# Patient Record
Sex: FEMALE | Race: WHITE | Employment: OTHER | URBAN - NONMETROPOLITAN AREA
[De-identification: names, ages, dates, MRNs, and addresses within clinical notes are randomized per-mention and may not be internally consistent; named-entity substitution may affect disease eponyms.]

---

## 2017-01-17 ENCOUNTER — OFFICE VISIT (OUTPATIENT)
Dept: CARDIOLOGY | Age: 67
End: 2017-01-17
Payer: MEDICARE

## 2017-01-17 VITALS
WEIGHT: 134 LBS | BODY MASS INDEX: 24.66 KG/M2 | RESPIRATION RATE: 18 BRPM | DIASTOLIC BLOOD PRESSURE: 70 MMHG | HEIGHT: 62 IN | HEART RATE: 68 BPM | SYSTOLIC BLOOD PRESSURE: 130 MMHG

## 2017-01-17 DIAGNOSIS — I10 ESSENTIAL HYPERTENSION: ICD-10-CM

## 2017-01-17 DIAGNOSIS — E78.00 HYPERCHOLESTEREMIA: ICD-10-CM

## 2017-01-17 DIAGNOSIS — I25.10 CORONARY ARTERY DISEASE INVOLVING NATIVE CORONARY ARTERY OF NATIVE HEART WITHOUT ANGINA PECTORIS: Primary | ICD-10-CM

## 2017-01-17 PROCEDURE — G8484 FLU IMMUNIZE NO ADMIN: HCPCS | Performed by: INTERNAL MEDICINE

## 2017-01-17 PROCEDURE — 99212 OFFICE O/P EST SF 10 MIN: CPT | Performed by: INTERNAL MEDICINE

## 2017-01-17 PROCEDURE — G8427 DOCREV CUR MEDS BY ELIG CLIN: HCPCS | Performed by: INTERNAL MEDICINE

## 2017-01-17 PROCEDURE — 3017F COLORECTAL CA SCREEN DOC REV: CPT | Performed by: INTERNAL MEDICINE

## 2017-01-17 PROCEDURE — 3014F SCREEN MAMMO DOC REV: CPT | Performed by: INTERNAL MEDICINE

## 2017-01-17 PROCEDURE — G8598 ASA/ANTIPLAT THER USED: HCPCS | Performed by: INTERNAL MEDICINE

## 2017-01-17 PROCEDURE — 1036F TOBACCO NON-USER: CPT | Performed by: INTERNAL MEDICINE

## 2017-01-17 PROCEDURE — G8420 CALC BMI NORM PARAMETERS: HCPCS | Performed by: INTERNAL MEDICINE

## 2017-01-17 RX ORDER — DESVENLAFAXINE 50 MG/1
50 TABLET, EXTENDED RELEASE ORAL DAILY
COMMUNITY
End: 2017-09-26 | Stop reason: ALTCHOICE

## 2017-01-17 RX ORDER — TRAMADOL HYDROCHLORIDE 50 MG/1
TABLET ORAL
Refills: 0 | COMMUNITY
Start: 2016-11-16 | End: 2017-09-26 | Stop reason: ALTCHOICE

## 2017-09-26 ENCOUNTER — OFFICE VISIT (OUTPATIENT)
Dept: CARDIOLOGY | Age: 67
End: 2017-09-26
Payer: MEDICARE

## 2017-09-26 VITALS
SYSTOLIC BLOOD PRESSURE: 118 MMHG | DIASTOLIC BLOOD PRESSURE: 80 MMHG | HEIGHT: 62 IN | WEIGHT: 136 LBS | HEART RATE: 55 BPM | BODY MASS INDEX: 25.03 KG/M2

## 2017-09-26 DIAGNOSIS — I25.10 CORONARY ARTERY DISEASE INVOLVING NATIVE CORONARY ARTERY OF NATIVE HEART WITHOUT ANGINA PECTORIS: Primary | ICD-10-CM

## 2017-09-26 DIAGNOSIS — I10 ESSENTIAL HYPERTENSION: ICD-10-CM

## 2017-09-26 PROCEDURE — 3017F COLORECTAL CA SCREEN DOC REV: CPT | Performed by: CLINICAL NURSE SPECIALIST

## 2017-09-26 PROCEDURE — G8427 DOCREV CUR MEDS BY ELIG CLIN: HCPCS | Performed by: CLINICAL NURSE SPECIALIST

## 2017-09-26 PROCEDURE — 3014F SCREEN MAMMO DOC REV: CPT | Performed by: CLINICAL NURSE SPECIALIST

## 2017-09-26 PROCEDURE — 1123F ACP DISCUSS/DSCN MKR DOCD: CPT | Performed by: CLINICAL NURSE SPECIALIST

## 2017-09-26 PROCEDURE — 1090F PRES/ABSN URINE INCON ASSESS: CPT | Performed by: CLINICAL NURSE SPECIALIST

## 2017-09-26 PROCEDURE — 1036F TOBACCO NON-USER: CPT | Performed by: CLINICAL NURSE SPECIALIST

## 2017-09-26 PROCEDURE — G8399 PT W/DXA RESULTS DOCUMENT: HCPCS | Performed by: CLINICAL NURSE SPECIALIST

## 2017-09-26 PROCEDURE — 4040F PNEUMOC VAC/ADMIN/RCVD: CPT | Performed by: CLINICAL NURSE SPECIALIST

## 2017-09-26 PROCEDURE — 99213 OFFICE O/P EST LOW 20 MIN: CPT | Performed by: CLINICAL NURSE SPECIALIST

## 2017-09-26 PROCEDURE — G8598 ASA/ANTIPLAT THER USED: HCPCS | Performed by: CLINICAL NURSE SPECIALIST

## 2017-09-26 PROCEDURE — G8420 CALC BMI NORM PARAMETERS: HCPCS | Performed by: CLINICAL NURSE SPECIALIST

## 2017-09-26 PROCEDURE — 93000 ELECTROCARDIOGRAM COMPLETE: CPT | Performed by: CLINICAL NURSE SPECIALIST

## 2017-09-26 RX ORDER — IRBESARTAN 300 MG/1
TABLET ORAL
Refills: 0 | COMMUNITY
Start: 2017-08-01 | End: 2019-12-10

## 2017-09-26 RX ORDER — HYDROCHLOROTHIAZIDE 25 MG/1
TABLET ORAL
Refills: 0 | COMMUNITY
Start: 2017-09-11 | End: 2019-12-10

## 2017-09-26 RX ORDER — CLONIDINE HYDROCHLORIDE 0.1 MG/1
TABLET ORAL
Refills: 0 | COMMUNITY
Start: 2017-07-05 | End: 2019-12-10

## 2017-10-02 ENCOUNTER — TRANSCRIBE ORDERS (OUTPATIENT)
Dept: ADMINISTRATIVE | Facility: HOSPITAL | Age: 67
End: 2017-10-02

## 2017-10-02 DIAGNOSIS — I73.9 PERIPHERAL VASCULAR DISEASE (HCC): Primary | ICD-10-CM

## 2017-10-02 DIAGNOSIS — D48.9 NEOPLASM OF UNCERTAIN BEHAVIOR: ICD-10-CM

## 2017-10-10 ENCOUNTER — HOSPITAL ENCOUNTER (OUTPATIENT)
Dept: ULTRASOUND IMAGING | Facility: HOSPITAL | Age: 67
Discharge: HOME | End: 2017-10-10

## 2017-10-10 ENCOUNTER — HOSPITAL ENCOUNTER (OUTPATIENT)
Dept: MRI IMAGING | Facility: HOSPITAL | Age: 67
Discharge: HOME | End: 2017-10-10
Admitting: NURSE PRACTITIONER

## 2017-10-10 DIAGNOSIS — D48.9 NEOPLASM OF UNCERTAIN BEHAVIOR: ICD-10-CM

## 2017-10-10 DIAGNOSIS — I73.9 PERIPHERAL VASCULAR DISEASE (HCC): ICD-10-CM

## 2017-10-10 PROCEDURE — 93923 UPR/LXTR ART STDY 3+ LVLS: CPT

## 2018-01-15 ENCOUNTER — TELEPHONE (OUTPATIENT)
Dept: CARDIOLOGY | Age: 68
End: 2018-01-15

## 2018-01-15 NOTE — TELEPHONE ENCOUNTER
LVM; Attempted to contact pt to move her appt to earlier in the day of Tuesday, 1- in order to consolidate Dr. Avalos Pea

## 2018-02-02 ENCOUNTER — TELEPHONE (OUTPATIENT)
Dept: CARDIOLOGY | Age: 68
End: 2018-02-02

## 2018-02-15 ENCOUNTER — OFFICE VISIT (OUTPATIENT)
Dept: CARDIOLOGY | Age: 68
End: 2018-02-15
Payer: MEDICARE

## 2018-02-15 VITALS
HEART RATE: 56 BPM | BODY MASS INDEX: 29.44 KG/M2 | HEIGHT: 62 IN | WEIGHT: 160 LBS | DIASTOLIC BLOOD PRESSURE: 68 MMHG | SYSTOLIC BLOOD PRESSURE: 140 MMHG

## 2018-02-15 DIAGNOSIS — I10 ESSENTIAL HYPERTENSION: ICD-10-CM

## 2018-02-15 DIAGNOSIS — I25.10 CORONARY ARTERY DISEASE INVOLVING NATIVE CORONARY ARTERY OF NATIVE HEART WITHOUT ANGINA PECTORIS: Primary | ICD-10-CM

## 2018-02-15 PROCEDURE — 99213 OFFICE O/P EST LOW 20 MIN: CPT | Performed by: CLINICAL NURSE SPECIALIST

## 2018-02-15 NOTE — PROGRESS NOTES
2/2/2014  lexiscan Positive for inferior apical myocardial ischemia, EF 62%, 2% ischemic myocardium on stress, low risk findings, AUC indication 15, AUC score 4     Chronic back pain     Chronic kidney disease, stage III (moderate)     GFR 40 mL/minute 7/10/15.  History of blood transfusion     Hyperlipidemia 1/6/2015    Hypertension     Osteoarthritis     Osteoporosis 8/2015    Torn tendon     LT HIP     Past Surgical History:   Procedure Laterality Date    ANKLE SURGERY Right     Right ankle fusion.  APPENDECTOMY      BACK SURGERY      BREAST SURGERY Bilateral     Bilateral breast reduction.  CARDIAC CATHETERIZATION  2/2/14  JDT    EF 50%    CARDIAC CATHETERIZATION  01/2016    Normal EF, mild nonocclusive CAD    CERVICAL FUSION      [facility], Dr. Tanya Medina.  CHOLECYSTECTOMY      COLONOSCOPY      DILATATION, ESOPHAGUS      ENDOSCOPY, COLON, DIAGNOSTIC      FRACTURE SURGERY      GASTRIC FUNDOPLICATION  4308    HERNIA REPAIR      SABINE    HYSTERECTOMY      total    JOINT REPLACEMENT      TONSILLECTOMY      TONSILLECTOMY      TOTAL HIP ARTHROPLASTY  11/2014    TOTAL KNEE ARTHROPLASTY Bilateral     TOTAL KNEE ARTHROPLASTY Right 2002    Total knee replacement, right knee revision, Dr. Marla Lynne 2002.       Family History   Problem Relation Age of Onset    Kidney Disease Mother     High Blood Pressure Mother     Heart Disease Father     Kidney Disease Father     Brain Cancer Brother      Social History   Substance Use Topics    Smoking status: Never Smoker    Smokeless tobacco: Never Used    Alcohol use No      Current Outpatient Prescriptions   Medication Sig Dispense Refill    cloNIDine (CATAPRES) 0.1 MG tablet TAKE 1 TABLET BY MOUTH AS NEEDED  0    hydrochlorothiazide (HYDRODIURIL) 25 MG tablet TAKE 1 TABLET BY MOUTH EVERY DAY  0    irbesartan (AVAPRO) 300 MG tablet 1 TABLET BY MOUTH DAILY  0    metoprolol tartrate (LOPRESSOR) 25 MG tablet Take 1 tablet by mouth 2 times daily 180 tablet 3    HYDROcodone-acetaminophen (NORCO)  MG per tablet Take 1 tablet by mouth every 6 hours as needed for Pain 120 tablet 0    estrogens, conjugated, (PREMARIN) 0.625 MG tablet Take 1 tablet by mouth daily 30 tablet 0    ALPRAZolam (XANAX) 0.25 MG tablet Take 0.25 mg by mouth nightly as needed.  PROVENTIL  (90 BASE) MCG/ACT inhaler Inhale 2 puffs into the lungs every 6 hours as needed        No current facility-administered medications for this visit. Allergies: Meloxicam; Diovan [valsartan]; and Demeclocycline hcl    Review of Systems  Constitutional  no significant activity change, appetite change, or unexpected weight change. No fever, chills or diaphoresis. No fatigue. HEENT  no significant rhinorrhea or epistaxis. No tinnitus or significant hearing loss. Eyes  no sudden vision change or amaurosis. Respiratory  no significant wheezing, stridor, apnea or cough. No dyspnea on exertion or shortness of breath. Cardiovascular  no exertional chest pain, orthopnea or PND. No sensation of arrhythmia or slow heart rate. No claudication or leg edema. Gastrointestinal  no abdominal swelling or pain. No blood in stool. No severe constipation, diarrhea, nausea, or vomiting. Genitourinary  no difficulty urinating, dysuria, frequency, or urgency. No flank pain or hematuria. Musculoskeletal  Hip pain with gait disturbance. She uses a cane but walker if having to walk long distance. Skin  no color change or rash. No pallor. No new surgical incision. Neurologic  no speech difficulty, facial asymmetry or lateralizing weakness. No seizures, presyncope, syncope, or significant dizziness. Hematologic  no easy bruising or excessive bleeding. Psychiatric  no severe anxiety or insomnia. No confusion. All other review of systems are negative.       Objective  Vital Signs - BP (!) 140/68   Pulse 56   Ht 5' 2\" (1.575 m)   Wt 160 lb (72.6 kg) BMI 29.26 kg/m²   General - Alisson is alert, cooperative, and pleasant. Well groomed. No acute distress. Body habitus is overweight. HEENT  The head is normocephalic. No circumoral cyanosis. Dentition is normal.   EYES -  No Xanthelasma, no arcus senilis, no conjunctival hemorrhages or discharge. Neck - Supple, without increased jugular venous pressures. No carotid bruits. No mass. Respiratory - Lungs are clear bilaterally. No wheezes or rales. Normal effort without use of accessory muscles. Cardiovascular  Heart has regular rhythm and rate. No murmurs, rubs or gallops. + pedal pulses and no varicosities. Abdominal -  Soft, nontender, nondistended. Bowel sounds are intact. Extremities - No clubbing, cyanosis, or  edema. Musculoskeletal -  No clubbing . No Osler's nodes. Gait- uses walker/cane. No kyphosis or scoliosis. Skin -  no statis ulcers or dermatitis. Neurological - No focal signs are identified. Oriented to person, place and time. Psychiatric -  Appropriate affect and mood. Assessment:    1. Coronary artery disease involving native coronary artery of native heart without angina pectoris     2. Essential hypertension       Data:  BP Readings from Last 3 Encounters:   02/15/18 (!) 140/68   09/26/17 118/80   01/17/17 130/70    Pulse Readings from Last 3 Encounters:   02/15/18 56   09/26/17 55   01/17/17 68      Blood pressure slightly elevated today but has been OK. Heart rate well controlled. Medical management includes ARB, beta blocker, HCTZ. States taking medications as prescribed  Stable cardiovascular status. No evidence of overt heart failure, angina or dysrhythmia. Plan    Follow up in 6 mos With Dr. Maximilian Herbert   Call with any questions or concerns  Follow up with Anna Moss DO for non cardiac problems  Report any new problems  Cardiovascular Fitness-Exercise as tolerated. Strive for 30 minutes of exercise most days of the week. Cardiac / Healthy Diet  Continue current medications as directed  Continue plan of treatment  It is always recommended that you bring your medications bottles with you to each visit - this is for your safety!        MICHELLE Martinez

## 2018-03-05 ENCOUNTER — HOSPITAL ENCOUNTER (OUTPATIENT)
Dept: WOMENS IMAGING | Age: 68
Discharge: HOME | End: 2018-03-05
Payer: MEDICARE

## 2018-03-05 DIAGNOSIS — Z12.31 ENCOUNTER FOR SCREENING MAMMOGRAM FOR BREAST CANCER: ICD-10-CM

## 2018-03-05 PROCEDURE — 77067 SCR MAMMO BI INCL CAD: CPT

## 2018-06-28 ENCOUNTER — TELEPHONE (OUTPATIENT)
Dept: CARDIOLOGY | Age: 68
End: 2018-06-28

## 2018-07-18 ENCOUNTER — TELEPHONE (OUTPATIENT)
Dept: CARDIOLOGY | Age: 68
End: 2018-07-18

## 2018-07-18 NOTE — TELEPHONE ENCOUNTER
Tyra Elliott no longer working PM on Thursdays, unable to reach pt and moved appt to 8/31 230PM sent letter to remind

## 2018-10-05 ENCOUNTER — TELEPHONE (OUTPATIENT)
Dept: CARDIOLOGY | Age: 68
End: 2018-10-05

## 2018-11-20 ENCOUNTER — TELEPHONE (OUTPATIENT)
Dept: CARDIOLOGY | Age: 68
End: 2018-11-20

## 2018-11-20 NOTE — TELEPHONE ENCOUNTER
Unable to leave vm on pt phone in regards to no show appt will be sending pt a letter on missed appt.  If pt does call pt can be scheduled w [facility] at [facility]

## 2018-12-10 ENCOUNTER — TELEPHONE (OUTPATIENT)
Dept: CARDIOLOGY | Age: 68
End: 2018-12-10

## 2018-12-10 ENCOUNTER — OFFICE VISIT (OUTPATIENT)
Dept: CARDIOLOGY | Age: 68
End: 2018-12-10
Payer: MEDICARE

## 2018-12-10 VITALS
SYSTOLIC BLOOD PRESSURE: 122 MMHG | HEIGHT: 60 IN | BODY MASS INDEX: 30.04 KG/M2 | HEART RATE: 46 BPM | WEIGHT: 153 LBS | DIASTOLIC BLOOD PRESSURE: 60 MMHG

## 2018-12-10 DIAGNOSIS — Z78.9 STATIN INTOLERANCE: ICD-10-CM

## 2018-12-10 DIAGNOSIS — I10 ESSENTIAL HYPERTENSION: ICD-10-CM

## 2018-12-10 DIAGNOSIS — I25.10 CORONARY ARTERY DISEASE INVOLVING NATIVE CORONARY ARTERY OF NATIVE HEART WITHOUT ANGINA PECTORIS: Primary | ICD-10-CM

## 2018-12-10 DIAGNOSIS — E78.00 HYPERCHOLESTEREMIA: ICD-10-CM

## 2018-12-10 PROCEDURE — 1036F TOBACCO NON-USER: CPT | Performed by: CLINICAL NURSE SPECIALIST

## 2018-12-10 PROCEDURE — 3017F COLORECTAL CA SCREEN DOC REV: CPT | Performed by: CLINICAL NURSE SPECIALIST

## 2018-12-10 PROCEDURE — 1123F ACP DISCUSS/DSCN MKR DOCD: CPT | Performed by: CLINICAL NURSE SPECIALIST

## 2018-12-10 PROCEDURE — 1090F PRES/ABSN URINE INCON ASSESS: CPT | Performed by: CLINICAL NURSE SPECIALIST

## 2018-12-10 PROCEDURE — G8419 CALC BMI OUT NRM PARAM NOF/U: HCPCS | Performed by: CLINICAL NURSE SPECIALIST

## 2018-12-10 PROCEDURE — G8484 FLU IMMUNIZE NO ADMIN: HCPCS | Performed by: CLINICAL NURSE SPECIALIST

## 2018-12-10 PROCEDURE — 99213 OFFICE O/P EST LOW 20 MIN: CPT | Performed by: CLINICAL NURSE SPECIALIST

## 2018-12-10 PROCEDURE — 4040F PNEUMOC VAC/ADMIN/RCVD: CPT | Performed by: CLINICAL NURSE SPECIALIST

## 2018-12-10 PROCEDURE — G8599 NO ASA/ANTIPLAT THER USE RNG: HCPCS | Performed by: CLINICAL NURSE SPECIALIST

## 2018-12-10 PROCEDURE — 1101F PT FALLS ASSESS-DOCD LE1/YR: CPT | Performed by: CLINICAL NURSE SPECIALIST

## 2018-12-10 PROCEDURE — G8399 PT W/DXA RESULTS DOCUMENT: HCPCS | Performed by: CLINICAL NURSE SPECIALIST

## 2018-12-10 PROCEDURE — 93000 ELECTROCARDIOGRAM COMPLETE: CPT | Performed by: CLINICAL NURSE SPECIALIST

## 2018-12-10 PROCEDURE — G8427 DOCREV CUR MEDS BY ELIG CLIN: HCPCS | Performed by: CLINICAL NURSE SPECIALIST

## 2018-12-10 ASSESSMENT — ENCOUNTER SYMPTOMS
VOMITING: 0
WHEEZING: 0
FACIAL SWELLING: 0
SHORTNESS OF BREATH: 0
COUGH: 0
ABDOMINAL PAIN: 0
CHEST TIGHTNESS: 0
NAUSEA: 0
EYE REDNESS: 0

## 2018-12-10 NOTE — PROGRESS NOTES
 estrogens, conjugated, (PREMARIN) 0.625 MG tablet Take 1 tablet by mouth daily 30 tablet 0    ALPRAZolam (XANAX) 0.25 MG tablet Take 0.25 mg by mouth nightly as needed.  PROVENTIL  (90 BASE) MCG/ACT inhaler Inhale 2 puffs into the lungs every 6 hours as needed        No current facility-administered medications for this visit. Allergies: Meloxicam; Diovan [valsartan]; and Demeclocycline hcl    Review of Systems  Review of Systems   Constitutional: Negative for activity change, diaphoresis, fatigue, fever and unexpected weight change. HENT: Negative for facial swelling and nosebleeds. Eyes: Negative for redness and visual disturbance. Respiratory: Negative for cough, chest tightness, shortness of breath and wheezing. Cardiovascular: Positive for palpitations. Negative for chest pain and leg swelling. Gastrointestinal: Negative for abdominal pain, nausea and vomiting. Endocrine: Negative for cold intolerance and heat intolerance. Genitourinary: Negative for dysuria and hematuria. Musculoskeletal: Negative for arthralgias and myalgias. Unsteady gait   Skin: Negative for pallor and rash. Neurological: Negative for dizziness, seizures, syncope, weakness and light-headedness. Hematological: Does not bruise/bleed easily. Psychiatric/Behavioral: Negative for agitation. The patient is not nervous/anxious. Objective  Vital Signs - /60   Pulse (!) 46   Ht 5' (1.524 m)   Wt 153 lb (69.4 kg)   BMI 29.88 kg/m²   Physical Exam   Constitutional: She is oriented to person, place, and time. She appears well-developed and well-nourished. HENT:   Head: Normocephalic and atraumatic. Eyes: Pupils are equal, round, and reactive to light. Right eye exhibits no discharge. Left eye exhibits no discharge. Neck: No JVD present. No tracheal deviation present. Cardiovascular: Normal rate, regular rhythm, normal heart sounds and intact distal pulses.   Exam reveals no

## 2018-12-11 RX ORDER — EZETIMIBE 10 MG/1
10 TABLET ORAL DAILY
Qty: 30 TABLET | Refills: 5 | Status: SHIPPED | OUTPATIENT
Start: 2018-12-11 | End: 2019-03-08 | Stop reason: SDUPTHER

## 2019-02-07 ENCOUNTER — TRANSCRIBE ORDERS (OUTPATIENT)
Dept: ONCOLOGY | Facility: CLINIC | Age: 69
End: 2019-02-07

## 2019-02-07 DIAGNOSIS — E61.1 IRON DEFICIENCY: ICD-10-CM

## 2019-02-07 DIAGNOSIS — D47.2 MONOCLONAL GAMMOPATHY: Primary | ICD-10-CM

## 2019-02-07 DIAGNOSIS — D64.9 NORMOCYTIC ANEMIA: ICD-10-CM

## 2019-02-07 DIAGNOSIS — E53.8 VITAMIN B12 DEFICIENCY: ICD-10-CM

## 2019-02-07 DIAGNOSIS — D75.89 MACROCYTOSIS: ICD-10-CM

## 2019-03-08 RX ORDER — EZETIMIBE 10 MG/1
10 TABLET ORAL DAILY
Qty: 90 TABLET | Refills: 3 | Status: SHIPPED | OUTPATIENT
Start: 2019-03-08 | End: 2019-12-10

## 2019-04-25 ENCOUNTER — HOSPITAL ENCOUNTER (OUTPATIENT)
Dept: MRI IMAGING | Age: 69
Discharge: HOME | End: 2019-04-25
Attending: ORTHOPAEDIC SURGERY

## 2019-04-25 ENCOUNTER — OFFICE VISIT (OUTPATIENT)
Dept: ORTHOPEDICS | Age: 69
End: 2019-04-25

## 2019-04-25 ENCOUNTER — IMAGING SERVICES (OUTPATIENT)
Dept: GENERAL RADIOLOGY | Age: 69
End: 2019-04-25
Attending: ORTHOPAEDIC SURGERY

## 2019-04-25 DIAGNOSIS — M25.552 LEFT HIP PAIN: ICD-10-CM

## 2019-04-25 DIAGNOSIS — M70.62 TROCHANTERIC BURSITIS OF LEFT HIP: ICD-10-CM

## 2019-04-25 DIAGNOSIS — M76.892 ENTHESOPATHY OF LEFT HIP REGION: ICD-10-CM

## 2019-04-25 DIAGNOSIS — Z96.649 STATUS POST REVISION OF TOTAL HIP: ICD-10-CM

## 2019-04-25 DIAGNOSIS — M25.552 LEFT HIP PAIN: Primary | ICD-10-CM

## 2019-04-25 PROCEDURE — 20610 DRAIN/INJ JOINT/BURSA W/O US: CPT | Performed by: ORTHOPAEDIC SURGERY

## 2019-04-25 PROCEDURE — 99202 OFFICE O/P NEW SF 15 MIN: CPT | Performed by: ORTHOPAEDIC SURGERY

## 2019-04-25 PROCEDURE — 73502 X-RAY EXAM HIP UNI 2-3 VIEWS: CPT | Performed by: RADIOLOGY

## 2019-04-25 PROCEDURE — 73721 MRI JNT OF LWR EXTRE W/O DYE: CPT

## 2019-04-25 PROCEDURE — 73721 MRI JNT OF LWR EXTRE W/O DYE: CPT | Performed by: RADIOLOGY

## 2019-04-26 ENCOUNTER — LETT/CORR TRANS (OUTPATIENT)
Dept: ORTHOPEDICS | Age: 69
End: 2019-04-26

## 2019-05-03 ENCOUNTER — TELEPHONE (OUTPATIENT)
Dept: ORTHOPEDICS | Age: 69
End: 2019-05-03

## 2019-12-10 ENCOUNTER — OFFICE VISIT (OUTPATIENT)
Dept: CARDIOLOGY | Age: 69
End: 2019-12-10
Payer: MEDICARE

## 2019-12-10 VITALS
DIASTOLIC BLOOD PRESSURE: 74 MMHG | HEART RATE: 72 BPM | WEIGHT: 150 LBS | HEIGHT: 60 IN | BODY MASS INDEX: 29.45 KG/M2 | SYSTOLIC BLOOD PRESSURE: 138 MMHG

## 2019-12-10 DIAGNOSIS — E78.2 MIXED HYPERLIPIDEMIA: ICD-10-CM

## 2019-12-10 DIAGNOSIS — I10 ESSENTIAL HYPERTENSION: ICD-10-CM

## 2019-12-10 DIAGNOSIS — R00.1 BRADYCARDIA: ICD-10-CM

## 2019-12-10 DIAGNOSIS — Z78.9 STATIN INTOLERANCE: ICD-10-CM

## 2019-12-10 DIAGNOSIS — I25.10 CORONARY ARTERY DISEASE INVOLVING NATIVE CORONARY ARTERY OF NATIVE HEART WITHOUT ANGINA PECTORIS: Primary | ICD-10-CM

## 2019-12-10 PROCEDURE — G8417 CALC BMI ABV UP PARAM F/U: HCPCS | Performed by: CLINICAL NURSE SPECIALIST

## 2019-12-10 PROCEDURE — 93000 ELECTROCARDIOGRAM COMPLETE: CPT | Performed by: CLINICAL NURSE SPECIALIST

## 2019-12-10 PROCEDURE — 1036F TOBACCO NON-USER: CPT | Performed by: CLINICAL NURSE SPECIALIST

## 2019-12-10 PROCEDURE — 1090F PRES/ABSN URINE INCON ASSESS: CPT | Performed by: CLINICAL NURSE SPECIALIST

## 2019-12-10 PROCEDURE — 1123F ACP DISCUSS/DSCN MKR DOCD: CPT | Performed by: CLINICAL NURSE SPECIALIST

## 2019-12-10 PROCEDURE — 3017F COLORECTAL CA SCREEN DOC REV: CPT | Performed by: CLINICAL NURSE SPECIALIST

## 2019-12-10 PROCEDURE — G8599 NO ASA/ANTIPLAT THER USE RNG: HCPCS | Performed by: CLINICAL NURSE SPECIALIST

## 2019-12-10 PROCEDURE — 99213 OFFICE O/P EST LOW 20 MIN: CPT | Performed by: CLINICAL NURSE SPECIALIST

## 2019-12-10 PROCEDURE — 4040F PNEUMOC VAC/ADMIN/RCVD: CPT | Performed by: CLINICAL NURSE SPECIALIST

## 2019-12-10 PROCEDURE — G8399 PT W/DXA RESULTS DOCUMENT: HCPCS | Performed by: CLINICAL NURSE SPECIALIST

## 2019-12-10 PROCEDURE — G8427 DOCREV CUR MEDS BY ELIG CLIN: HCPCS | Performed by: CLINICAL NURSE SPECIALIST

## 2019-12-10 PROCEDURE — G8484 FLU IMMUNIZE NO ADMIN: HCPCS | Performed by: CLINICAL NURSE SPECIALIST

## 2019-12-10 RX ORDER — OXYBUTYNIN CHLORIDE 5 MG/1
5 TABLET ORAL DAILY
COMMUNITY
Start: 2019-11-19

## 2019-12-10 RX ORDER — CLONIDINE HYDROCHLORIDE 0.1 MG/1
0.1 TABLET ORAL PRN
Qty: 90 TABLET | Refills: 3 | Status: SHIPPED | OUTPATIENT
Start: 2019-12-10 | End: 2019-12-18 | Stop reason: SDUPTHER

## 2019-12-10 RX ORDER — AMOXICILLIN AND CLAVULANATE POTASSIUM 875; 125 MG/1; MG/1
1 TABLET, FILM COATED ORAL DAILY
Refills: 0 | COMMUNITY
Start: 2019-11-27 | End: 2020-02-20 | Stop reason: ALTCHOICE

## 2019-12-10 RX ORDER — IRBESARTAN 300 MG/1
300 TABLET ORAL DAILY
COMMUNITY

## 2019-12-10 RX ORDER — MONTELUKAST SODIUM 10 MG/1
10 TABLET ORAL DAILY
COMMUNITY
Start: 2019-11-19

## 2019-12-10 RX ORDER — HYDROCHLOROTHIAZIDE 25 MG/1
25 TABLET ORAL DAILY
COMMUNITY

## 2019-12-10 RX ORDER — CLONIDINE HYDROCHLORIDE 0.1 MG/1
0.1 TABLET ORAL PRN
COMMUNITY
End: 2019-12-10 | Stop reason: SDUPTHER

## 2019-12-10 ASSESSMENT — ENCOUNTER SYMPTOMS
FACIAL SWELLING: 0
CHEST TIGHTNESS: 0
VOMITING: 0
WHEEZING: 0
NAUSEA: 0
ABDOMINAL PAIN: 0
EYE REDNESS: 0
COUGH: 0
SHORTNESS OF BREATH: 0

## 2019-12-18 RX ORDER — CLONIDINE HYDROCHLORIDE 0.1 MG/1
TABLET ORAL
Qty: 90 TABLET | Refills: 3 | Status: SHIPPED | OUTPATIENT
Start: 2019-12-18

## 2019-12-20 ENCOUNTER — TELEPHONE (OUTPATIENT)
Dept: OTOLARYNGOLOGY | Age: 69
End: 2019-12-20

## 2020-01-16 ENCOUNTER — OFFICE VISIT (OUTPATIENT)
Dept: OTOLARYNGOLOGY | Age: 70
End: 2020-01-16
Payer: MEDICARE

## 2020-01-16 VITALS
HEIGHT: 60 IN | DIASTOLIC BLOOD PRESSURE: 86 MMHG | WEIGHT: 150.31 LBS | TEMPERATURE: 97.8 F | BODY MASS INDEX: 29.51 KG/M2 | SYSTOLIC BLOOD PRESSURE: 128 MMHG

## 2020-01-16 PROCEDURE — 3017F COLORECTAL CA SCREEN DOC REV: CPT | Performed by: NURSE PRACTITIONER

## 2020-01-16 PROCEDURE — 1090F PRES/ABSN URINE INCON ASSESS: CPT | Performed by: NURSE PRACTITIONER

## 2020-01-16 PROCEDURE — G8427 DOCREV CUR MEDS BY ELIG CLIN: HCPCS | Performed by: NURSE PRACTITIONER

## 2020-01-16 PROCEDURE — G8417 CALC BMI ABV UP PARAM F/U: HCPCS | Performed by: NURSE PRACTITIONER

## 2020-01-16 PROCEDURE — 1123F ACP DISCUSS/DSCN MKR DOCD: CPT | Performed by: NURSE PRACTITIONER

## 2020-01-16 PROCEDURE — G8399 PT W/DXA RESULTS DOCUMENT: HCPCS | Performed by: NURSE PRACTITIONER

## 2020-01-16 PROCEDURE — G8484 FLU IMMUNIZE NO ADMIN: HCPCS | Performed by: NURSE PRACTITIONER

## 2020-01-16 PROCEDURE — 4040F PNEUMOC VAC/ADMIN/RCVD: CPT | Performed by: NURSE PRACTITIONER

## 2020-01-16 PROCEDURE — 99213 OFFICE O/P EST LOW 20 MIN: CPT | Performed by: NURSE PRACTITIONER

## 2020-01-16 PROCEDURE — 1036F TOBACCO NON-USER: CPT | Performed by: NURSE PRACTITIONER

## 2020-01-16 RX ORDER — AZELASTINE HYDROCHLORIDE 137 UG/1
SPRAY, METERED NASAL
Refills: 5 | Status: ON HOLD | COMMUNITY
Start: 2019-11-26 | End: 2020-03-11 | Stop reason: HOSPADM

## 2020-01-16 ASSESSMENT — ENCOUNTER SYMPTOMS
GASTROINTESTINAL NEGATIVE: 1
EYES NEGATIVE: 1
WHEEZING: 1

## 2020-01-16 NOTE — PATIENT INSTRUCTIONS
Continue current allergy regimen until return to evaluate CT sinus imaging studies to determine if candidate for surgery    Will re-scan to determine if sinuses are still obstructed    Discussed that repair of left TM would require probable reconstruction of TM at [facility].  Otherwise Dry ear precautions    Call for problems or worsening symptoms

## 2020-01-16 NOTE — PROGRESS NOTES
Office Visit     Patient Care Team: Patient Care Team:  Yael Oropeza DO as PCP - [place] [id number], DO as PCP - [facility] Provider  Nelsy Wylie MD (Cardiology)  Cinthya Diaz MD as Consulting Physician (Family Medicine)  Audra Manzanares MD  Surgery: No surgery found No surgery found    Chief Complaint:  Sinus Problem      Viridiana Gallo is a 81 y.o. female who is here for for evaluation of chronic rhinosinusitis. . She reports her symptoms are severe in nature with allergies symptoms, nasal drainage sinus pressure occurring constantly however reports 1-2 severe infections per year. These symptoms initially started several year(s). The patient symptoms have been are worsening. The symptoms are aggravated by weather fluctuations, allergens. The symptoms are alleviated by  nothing. She reports difficulty breathing through her nose but primarily at night. She reports dull maxillary sinus pressure that occurs daily. She reports that her sinus problems aggravate her asthma as well. She did undergo a CT sinuses in November in [place] with near complete opacification of maxillary sinuses with notable septal deviation - I do not have these images. She notes a history of a broken nose. She is completing her third week of Augmentin for a sinus infection. She is on Flonase, Astelin, Zyrtec and Singulair for allergy therapy. She also has a history of hearing loss with placement of a tube Dr Lavera Najjar ? 6-8 months ago with tube extrusion with a subsequent perforation. She had a notable left sided hearing loss on audiogram in Nov 2019. DATA REVIEWED THIS VISIT:   CT  Report  Prior Audiogram     This data has been reviewed by MICHELLE Telles and treatment implemented as necessary.            Past Medical History:   Diagnosis Date    Asthma     CAD (coronary artery disease)     Chest tightness or pressure 2/2/2014 2/2/2014  lexiscan Positive for inferior apical myocardial ischemia, EF 62%, 2% ischemic myocardium on stress, low risk findings, AUC indication 15, AUC score 4     Chronic back pain     Chronic kidney disease, stage III (moderate) (HCC)     GFR 40 mL/minute 7/10/15.  History of blood transfusion     Hyperlipidemia 1/6/2015    Hypertension     Osteoarthritis     Osteoporosis 8/2015    Torn tendon     LT HIP          Past Surgical History:   Procedure Laterality Date    ANKLE SURGERY Right     Right ankle fusion.  APPENDECTOMY      BACK SURGERY      BREAST SURGERY Bilateral     Bilateral breast reduction.  CARDIAC CATHETERIZATION  2/2/14  JDT    EF 50%    CARDIAC CATHETERIZATION  01/2016    Normal EF, mild nonocclusive CAD    CERVICAL FUSION      [facility], Dr. Kade Cerda.  CHOLECYSTECTOMY      COLONOSCOPY      DILATATION, ESOPHAGUS      ENDOSCOPY, COLON, DIAGNOSTIC      FRACTURE SURGERY      GASTRIC FUNDOPLICATION  0215    HERNIA REPAIR      SABINE    HYSTERECTOMY      total    JOINT REPLACEMENT      TONSILLECTOMY      TONSILLECTOMY      TOTAL HIP ARTHROPLASTY  11/2014    TOTAL KNEE ARTHROPLASTY Bilateral     TOTAL KNEE ARTHROPLASTY Right 2002    Total knee replacement, right knee revision, Dr. Katiuska Davis 2002.            Allergies   Allergen Reactions    Meloxicam Other (See Comments)     Kidney failure (to also include the generic form Mobic)    Diovan [Valsartan]      Kidney failure    Demeclocycline Hcl Rash          Family History   Problem Relation Age of Onset    Kidney Disease Mother     High Blood Pressure Mother     Heart Disease Father     Kidney Disease Father     Brain Cancer Brother           Social History     Socioeconomic History    Marital status:      Spouse name: None    Number of children: None    Years of education: None    Highest education level: None   Occupational History    None   Social Needs    Financial resource strain: None    Food insecurity:     Worry: None     Inability: needed        No current facility-administered medications for this visit. Review of Systems   Constitutional: Negative. HENT:        See HPI   Eyes: Negative. Respiratory: Positive for wheezing. Asthma   Cardiovascular: Negative. Gastrointestinal: Negative. Endocrine: Negative. Genitourinary: Negative. Musculoskeletal: Negative. Skin: Negative. Allergic/Immunologic: Positive for environmental allergies. Neurological: Negative. Hematological: Negative. Psychiatric/Behavioral: Negative. Physical Exam  /86   Temp 97.8 °F (36.6 °C)   Ht 5' (1.524 m)   Wt 150 lb 5 oz (68.2 kg)   BMI 29.36 kg/m²     CONSTITUTIONAL: well nourished, well-developed, alert, oriented, in no acute distress     COMMUNICATION AND VOICE: able to communicate normally, normal voice quality    HEAD: normocephalic, no lesions, atraumatic, no tenderness, no masses     EYES: ocular motility normal, eyelids normal, orbits normal, no proptosis, conjunctiva normal , pupils equal, round     FACE: appearance normal, no lesions, no tenderness, no deformities, facial motion symmetric. Monica Beagle       SALIVARY GLANDS: parotid glands with no tenderness, no swelling, no masses, submandibular glands with normal size, nontender    EARS:  Hearing: hearing to conversational voice moderately impaired    Ears:     Right Ear:     External: external ears normal     Otoscopy Ear Canal: canal clear     Otoscopy TM: perforation: marginal and 80%       Left Ear:     External: external ears normal     Otoscopy Ear Canal: canal clear     Otoscopy TM: TM's normal      NOSE:    External nose is deviated to the right    Nose: nares normal, septum deviated  Left, mucosa inflamed, mucosa congested and turbinates: hypertrophic      Nasal membranes are without lesions, vestibule without purulence      ORAL:    Oral:    Lips: normal upper and lower lips without lesion    Teeth: good dentition    Oropharynx:normal Tonsils are normal to inspection without masses or lesions. Palate and uvula are normal.  Posterior pharynx without lesions or erythema. Oral         mucosa is moist without lesions. Tongue: normal tongue is normal to inspection without lesions, normal tongue mobility, lingual mucosa normal    Floor of mouth: Warthin ducts patent, mucosa normal       LARYNGEAL:      Hypopharynx: not examined     Larynx: not examined       NECK:     Inspection and Palpation: neck appearance normal, no masses or       Tenderness. Thyroid is normal without enlargement or nodules palpated, rhonchi noted bilaterally       LYMPHATIC: No cervical lymphadenopathy noted. CHEST/RESPIRATORY: normal respiratory effort, no shortness of breath      CARDIOVASCULAR: no cyanosis or edema       NEUROLOGICAL/PSYCHIATRIC: oriented to time, place and person,       mood normal, affect appropriate, CN II-XII intact grossly      Assessment/ Plan:       Diagnosis Orders   1. Chronic maxillary sinusitis  CT SINUS WO CONTRAST   2. Deviated septum  CT SINUS WO CONTRAST   3. Hypertrophy of nasal turbinates  CT SINUS WO CONTRAST   4. Chronic sinusitis, unspecified location     5. Allergic rhinitis, unspecified seasonality, unspecified trigger     6. Asthma, unspecified asthma severity, unspecified whether complicated, unspecified whether persistent     7. Mixed conductive and sensorineural hearing loss of left ear, unspecified hearing status on contralateral side     8. Sensorineural hearing loss (SNHL) of right ear, unspecified hearing status on contralateral side     9. Perforation of tympanic membrane, left     10.  ETD (Eustachian tube dysfunction), bilateral           Orders Placed This Encounter   Procedures    CT SINUS WO CONTRAST     Standing Status:   Future     Standing Expiration Date:   1/16/2021     Order Specific Question:   Reason for exam:     Answer:   chronic rhinosinusitis, deviated septum, turbinate hypertrophy       No orders of the defined

## 2020-01-23 ENCOUNTER — HOSPITAL ENCOUNTER (OUTPATIENT)
Dept: WOMENS IMAGING | Age: 70
Discharge: HOME | End: 2020-01-23
Payer: MEDICARE

## 2020-01-23 ENCOUNTER — HOSPITAL ENCOUNTER (OUTPATIENT)
Dept: CT IMAGING | Age: 70
Discharge: HOME | End: 2020-01-23
Payer: MEDICARE

## 2020-01-23 PROCEDURE — 70486 CT MAXILLOFACIAL W/O DYE: CPT

## 2020-01-23 PROCEDURE — 77080 DXA BONE DENSITY AXIAL: CPT

## 2020-02-06 ENCOUNTER — OFFICE VISIT (OUTPATIENT)
Dept: OTOLARYNGOLOGY | Age: 70
End: 2020-02-06
Payer: MEDICARE

## 2020-02-06 ENCOUNTER — TELEPHONE (OUTPATIENT)
Dept: OTOLARYNGOLOGY | Age: 70
End: 2020-02-06

## 2020-02-06 VITALS
BODY MASS INDEX: 29.45 KG/M2 | SYSTOLIC BLOOD PRESSURE: 122 MMHG | HEIGHT: 60 IN | WEIGHT: 150 LBS | DIASTOLIC BLOOD PRESSURE: 82 MMHG

## 2020-02-06 PROBLEM — J34.3 NASAL TURBINATE HYPERTROPHY: Status: ACTIVE | Noted: 2020-02-06

## 2020-02-06 PROBLEM — J34.2 DEVIATED NASAL SEPTUM: Status: ACTIVE | Noted: 2020-02-06

## 2020-02-06 PROBLEM — J34.89 NASAL OBSTRUCTION: Status: ACTIVE | Noted: 2020-02-06

## 2020-02-06 PROBLEM — J32.4 CHRONIC PANSINUSITIS: Status: ACTIVE | Noted: 2020-02-06

## 2020-02-06 PROCEDURE — G8417 CALC BMI ABV UP PARAM F/U: HCPCS | Performed by: OTOLARYNGOLOGY

## 2020-02-06 PROCEDURE — G8399 PT W/DXA RESULTS DOCUMENT: HCPCS | Performed by: OTOLARYNGOLOGY

## 2020-02-06 PROCEDURE — G8484 FLU IMMUNIZE NO ADMIN: HCPCS | Performed by: OTOLARYNGOLOGY

## 2020-02-06 PROCEDURE — G8427 DOCREV CUR MEDS BY ELIG CLIN: HCPCS | Performed by: OTOLARYNGOLOGY

## 2020-02-06 PROCEDURE — 3017F COLORECTAL CA SCREEN DOC REV: CPT | Performed by: OTOLARYNGOLOGY

## 2020-02-06 PROCEDURE — 99213 OFFICE O/P EST LOW 20 MIN: CPT | Performed by: OTOLARYNGOLOGY

## 2020-02-06 PROCEDURE — 1090F PRES/ABSN URINE INCON ASSESS: CPT | Performed by: OTOLARYNGOLOGY

## 2020-02-06 PROCEDURE — 4040F PNEUMOC VAC/ADMIN/RCVD: CPT | Performed by: OTOLARYNGOLOGY

## 2020-02-06 PROCEDURE — 1123F ACP DISCUSS/DSCN MKR DOCD: CPT | Performed by: OTOLARYNGOLOGY

## 2020-02-06 PROCEDURE — 1036F TOBACCO NON-USER: CPT | Performed by: OTOLARYNGOLOGY
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
## 2020-02-06 NOTE — ASSESSMENT & PLAN NOTE
Long history of nasal obstruction. She is a chronic mouth breather especially at night.   As result she is a restless sleeper and often awakens fatigued and can lack energy during the day

## 2020-03-05 ENCOUNTER — TELEPHONE (OUTPATIENT)
Dept: OTOLARYNGOLOGY | Age: 70
End: 2020-03-05

## 2020-03-05 ENCOUNTER — HOSPITAL ENCOUNTER (OUTPATIENT)
Dept: CT IMAGING | Age: 70
Discharge: HOME | End: 2020-03-05
Payer: MEDICARE

## 2020-03-05 PROCEDURE — 72125 CT NECK SPINE W/O DYE: CPT

## 2020-03-05 NOTE — TELEPHONE ENCOUNTER
Tried to call patients PCP office to check on status of clearance as she is scheduled for surgery next Wednesday. If we do not receive patients clearance she will need to be rescheduled.

## 2020-03-06 ENCOUNTER — HOSPITAL ENCOUNTER (OUTPATIENT)
Dept: NON INVASIVE DIAGNOSTICS | Age: 70
Discharge: HOME | End: 2020-03-06
Payer: MEDICARE

## 2020-03-06 ENCOUNTER — HOSPITAL ENCOUNTER (OUTPATIENT)
Dept: GENERAL RADIOLOGY | Age: 70
Discharge: HOME | End: 2020-03-06
Payer: MEDICARE

## 2020-03-06 PROCEDURE — 93005 ELECTROCARDIOGRAM TRACING: CPT | Performed by: FAMILY MEDICINE

## 2020-03-06 PROCEDURE — 71046 X-RAY EXAM CHEST 2 VIEWS: CPT

## 2020-03-08 LAB
EKG P AXIS: 59 DEGREES
EKG P-R INTERVAL: 148 MS
EKG Q-T INTERVAL: 432 MS
EKG QRS DURATION: 104 MS
EKG QTC CALCULATION (BAZETT): 427 MS
EKG T AXIS: 72 DEGREES

## 2020-03-08 PROCEDURE — 93010 ELECTROCARDIOGRAM REPORT: CPT | Performed by: INTERNAL MEDICINE

## 2020-03-10 ENCOUNTER — ANESTHESIA EVENT (OUTPATIENT)
Dept: OPERATING ROOM | Age: 70
End: 2020-03-10

## 2020-03-10 ENCOUNTER — HOSPITAL ENCOUNTER (OUTPATIENT)
Dept: LAB | Age: 70
Discharge: HOME | End: 2020-03-10
Payer: MEDICARE

## 2020-03-11 ENCOUNTER — HOSPITAL ENCOUNTER (OUTPATIENT)
Age: 70
Setting detail: SPECIMEN
Discharge: HOME | End: 2020-03-11
Payer: MEDICARE

## 2020-03-11 ENCOUNTER — ANESTHESIA (OUTPATIENT)
Dept: OPERATING ROOM | Age: 70
End: 2020-03-11

## 2020-03-11 ENCOUNTER — HOSPITAL ENCOUNTER (OUTPATIENT)
Age: 70
Setting detail: OUTPATIENT SURGERY
Discharge: HOME | End: 2020-03-11
Attending: OTOLARYNGOLOGY | Admitting: OTOLARYNGOLOGY
Payer: MEDICARE

## 2020-03-11 VITALS
DIASTOLIC BLOOD PRESSURE: 102 MMHG | OXYGEN SATURATION: 99 % | RESPIRATION RATE: 18 BRPM | SYSTOLIC BLOOD PRESSURE: 166 MMHG

## 2020-03-11 VITALS
DIASTOLIC BLOOD PRESSURE: 81 MMHG | BODY MASS INDEX: 28.86 KG/M2 | HEART RATE: 72 BPM | SYSTOLIC BLOOD PRESSURE: 141 MMHG | WEIGHT: 147 LBS | TEMPERATURE: 97.8 F | OXYGEN SATURATION: 93 % | RESPIRATION RATE: 16 BRPM | HEIGHT: 60 IN

## 2020-03-11 DIAGNOSIS — Z01.818 PREOPERATIVE TESTING: ICD-10-CM

## 2020-03-11 LAB
ANION GAP SERPL CALCULATED.3IONS-SCNC: 12 MMOL/L (ref 7–19)
BASOPHILS ABSOLUTE: 0.1 K/UL (ref 0–0.2)
BASOPHILS RELATIVE PERCENT: 1.4 % (ref 0–1)
BUN BLDV-MCNC: 16 MG/DL (ref 8–23)
CALCIUM SERPL-MCNC: 9.3 MG/DL (ref 8.8–10.2)
CHLORIDE BLD-SCNC: 101 MMOL/L (ref 98–111)
CO2: 30 MMOL/L (ref 22–29)
CREAT SERPL-MCNC: 0.6 MG/DL (ref 0.5–0.9)
EOSINOPHILS ABSOLUTE: 0.8 K/UL (ref 0–0.6)
EOSINOPHILS RELATIVE PERCENT: 9.6 % (ref 0–5)
GFR NON-AFRICAN AMERICAN: >60
GLUCOSE BLD-MCNC: 81 MG/DL (ref 74–109)
HCT VFR BLD CALC: 40.5 % (ref 37–47)
HEMOGLOBIN: 12.7 G/DL (ref 12–16)
IMMATURE GRANULOCYTES #: 0 K/UL
LYMPHOCYTES ABSOLUTE: 3.1 K/UL (ref 1.1–4.5)
LYMPHOCYTES RELATIVE PERCENT: 38.3 % (ref 20–40)
MCH RBC QN AUTO: 31.7 PG (ref 27–31)
MCHC RBC AUTO-ENTMCNC: 31.4 G/DL (ref 33–37)
MCV RBC AUTO: 101 FL (ref 81–99)
MONOCYTES ABSOLUTE: 0.8 K/UL (ref 0–0.9)
MONOCYTES RELATIVE PERCENT: 9.6 % (ref 0–10)
NEUTROPHILS ABSOLUTE: 3.3 K/UL (ref 1.5–7.5)
NEUTROPHILS RELATIVE PERCENT: 40.7 % (ref 50–65)
PDW BLD-RTO: 12.3 % (ref 11.5–14.5)
PLATELET # BLD: 278 K/UL (ref 130–400)
PMV BLD AUTO: 9.6 FL (ref 9.4–12.3)
POTASSIUM SERPL-SCNC: 3.7 MMOL/L (ref 3.5–5)
RBC # BLD: 4.01 M/UL (ref 4.2–5.4)
SODIUM BLD-SCNC: 143 MMOL/L (ref 136–145)
WBC # BLD: 8.1 K/UL (ref 4.8–10.8)

## 2020-03-11 PROCEDURE — G8918 PT W/O PREOP ORDER IV AB PRO: HCPCS

## 2020-03-11 PROCEDURE — 31254 NSL/SINS NDSC W/PRTL ETHMDCT: CPT

## 2020-03-11 PROCEDURE — 88311 DECALCIFY TISSUE: CPT

## 2020-03-11 PROCEDURE — 30802 ABLATE INF TURBINATE SUBMUC: CPT | Performed by: OTOLARYNGOLOGY

## 2020-03-11 PROCEDURE — 80048 BASIC METABOLIC PNL TOTAL CA: CPT

## 2020-03-11 PROCEDURE — 30520 REPAIR OF NASAL SEPTUM: CPT

## 2020-03-11 PROCEDURE — 85025 COMPLETE CBC W/AUTO DIFF WBC: CPT

## 2020-03-11 PROCEDURE — 31256 EXPLORATION MAXILLARY SINUS: CPT | Performed by: OTOLARYNGOLOGY

## 2020-03-11 PROCEDURE — 30520 REPAIR OF NASAL SEPTUM: CPT | Performed by: OTOLARYNGOLOGY

## 2020-03-11 PROCEDURE — C2625 STENT, NON-COR, TEM W/DEL SY: HCPCS | Performed by: OTOLARYNGOLOGY

## 2020-03-11 PROCEDURE — 88305 TISSUE EXAM BY PATHOLOGIST: CPT

## 2020-03-11 PROCEDURE — 30130 EXCISE INFERIOR TURBINATE: CPT

## 2020-03-11 PROCEDURE — G8907 PT DOC NO EVENTS ON DISCHARG: HCPCS

## 2020-03-11 PROCEDURE — 31254 NSL/SINS NDSC W/PRTL ETHMDCT: CPT | Performed by: OTOLARYNGOLOGY

## 2020-03-11 PROCEDURE — 36415 COLL VENOUS BLD VENIPUNCTURE: CPT

## 2020-03-11 DEVICE — PROPEL MINI SINUS IMPLANT
Type: IMPLANTABLE DEVICE | Site: NOSE | Status: FUNCTIONAL
Brand: PROPEL MINI

## 2020-03-11 RX ORDER — DEXAMETHASONE SODIUM PHOSPHATE 4 MG/ML
INJECTION, SOLUTION INTRA-ARTICULAR; INTRALESIONAL; INTRAMUSCULAR; INTRAVENOUS; SOFT TISSUE PRN
Status: DISCONTINUED | OUTPATIENT
Start: 2020-03-11 | End: 2020-03-11 | Stop reason: SDUPTHER

## 2020-03-11 RX ORDER — LIDOCAINE HYDROCHLORIDE 10 MG/ML
INJECTION, SOLUTION EPIDURAL; INFILTRATION; INTRACAUDAL; PERINEURAL PRN
Status: DISCONTINUED | OUTPATIENT
Start: 2020-03-11 | End: 2020-03-11 | Stop reason: SDUPTHER

## 2020-03-11 RX ORDER — ROCURONIUM BROMIDE 10 MG/ML
INJECTION, SOLUTION INTRAVENOUS PRN
Status: DISCONTINUED | OUTPATIENT
Start: 2020-03-11 | End: 2020-03-11 | Stop reason: SDUPTHER

## 2020-03-11 RX ORDER — GINSENG 100 MG
CAPSULE ORAL PRN
Status: DISCONTINUED | OUTPATIENT
Start: 2020-03-11 | End: 2020-03-11 | Stop reason: ALTCHOICE

## 2020-03-11 RX ORDER — OXYMETAZOLINE HYDROCHLORIDE 0.05 G/100ML
2 SPRAY NASAL 2 TIMES DAILY PRN
Status: DISCONTINUED | OUTPATIENT
Start: 2020-03-11 | End: 2020-03-11 | Stop reason: HOSPADM

## 2020-03-11 RX ORDER — LIDOCAINE HYDROCHLORIDE AND EPINEPHRINE 10; 10 MG/ML; UG/ML
INJECTION, SOLUTION INFILTRATION; PERINEURAL PRN
Status: DISCONTINUED | OUTPATIENT
Start: 2020-03-11 | End: 2020-03-11 | Stop reason: ALTCHOICE

## 2020-03-11 RX ORDER — ONDANSETRON 2 MG/ML
INJECTION INTRAMUSCULAR; INTRAVENOUS PRN
Status: DISCONTINUED | OUTPATIENT
Start: 2020-03-11 | End: 2020-03-11 | Stop reason: SDUPTHER

## 2020-03-11 RX ORDER — HYDROMORPHONE HCL 110MG/55ML
0.5 PATIENT CONTROLLED ANALGESIA SYRINGE INTRAVENOUS
Status: DISCONTINUED | OUTPATIENT
Start: 2020-03-11 | End: 2020-03-11 | Stop reason: HOSPADM

## 2020-03-11 RX ORDER — LABETALOL HYDROCHLORIDE 5 MG/ML
5 INJECTION, SOLUTION INTRAVENOUS ONCE
Status: COMPLETED | OUTPATIENT
Start: 2020-03-11 | End: 2020-03-11

## 2020-03-11 RX ORDER — SODIUM CHLORIDE, SODIUM LACTATE, POTASSIUM CHLORIDE, CALCIUM CHLORIDE 600; 310; 30; 20 MG/100ML; MG/100ML; MG/100ML; MG/100ML
INJECTION, SOLUTION INTRAVENOUS CONTINUOUS
Status: DISCONTINUED | OUTPATIENT
Start: 2020-03-11 | End: 2020-03-11

## 2020-03-11 RX ORDER — TRIAMCINOLONE ACETONIDE 40 MG/ML
INJECTION, SUSPENSION INTRA-ARTICULAR; INTRAMUSCULAR PRN
Status: DISCONTINUED | OUTPATIENT
Start: 2020-03-11 | End: 2020-03-11 | Stop reason: ALTCHOICE

## 2020-03-11 RX ORDER — PROPOFOL 10 MG/ML
INJECTION, EMULSION INTRAVENOUS PRN
Status: DISCONTINUED | OUTPATIENT
Start: 2020-03-11 | End: 2020-03-11 | Stop reason: SDUPTHER

## 2020-03-11 RX ORDER — LIDOCAINE HYDROCHLORIDE 10 MG/ML
1 INJECTION, SOLUTION EPIDURAL; INFILTRATION; INTRACAUDAL; PERINEURAL
Status: DISCONTINUED | OUTPATIENT
Start: 2020-03-11 | End: 2020-03-11 | Stop reason: HOSPADM

## 2020-03-11 RX ORDER — CEPHALEXIN 500 MG/1
500 CAPSULE ORAL 3 TIMES DAILY
Qty: 15 CAPSULE | Refills: 0 | Status: SHIPPED | OUTPATIENT
Start: 2020-03-11 | End: 2020-03-16

## 2020-03-11 RX ORDER — CEFAZOLIN SODIUM 1 G/3ML
INJECTION, POWDER, FOR SOLUTION INTRAMUSCULAR; INTRAVENOUS PRN
Status: DISCONTINUED | OUTPATIENT
Start: 2020-03-11 | End: 2020-03-11 | Stop reason: SDUPTHER

## 2020-03-11 RX ORDER — HYDROCODONE BITARTRATE AND ACETAMINOPHEN 7.5; 325 MG/1; MG/1
1 TABLET ORAL EVERY 6 HOURS PRN
Qty: 20 TABLET | Refills: 0 | Status: SHIPPED | OUTPATIENT
Start: 2020-03-11 | End: 2020-03-16

## 2020-03-11 RX ORDER — SCOLOPAMINE TRANSDERMAL SYSTEM 1 MG/1
PATCH, EXTENDED RELEASE TRANSDERMAL PRN
Status: DISCONTINUED | OUTPATIENT
Start: 2020-03-11 | End: 2020-03-11 | Stop reason: SDUPTHER

## 2020-03-11 RX ORDER — SODIUM CHLORIDE, SODIUM LACTATE, POTASSIUM CHLORIDE, CALCIUM CHLORIDE 600; 310; 30; 20 MG/100ML; MG/100ML; MG/100ML; MG/100ML
INJECTION, SOLUTION INTRAVENOUS CONTINUOUS
Status: DISCONTINUED | OUTPATIENT
Start: 2020-03-11 | End: 2020-03-11 | Stop reason: HOSPADM

## 2020-03-11 RX ORDER — FENTANYL CITRATE 50 UG/ML
INJECTION, SOLUTION INTRAMUSCULAR; INTRAVENOUS PRN
Status: DISCONTINUED | OUTPATIENT
Start: 2020-03-11 | End: 2020-03-11 | Stop reason: SDUPTHER

## 2020-03-11 RX ORDER — OXYMETAZOLINE HYDROCHLORIDE 0.05 G/100ML
SPRAY NASAL PRN
Status: DISCONTINUED | OUTPATIENT
Start: 2020-03-11 | End: 2020-03-11 | Stop reason: ALTCHOICE

## 2020-03-11 RX ORDER — ONDANSETRON 8 MG/1
8 TABLET, ORALLY DISINTEGRATING ORAL EVERY 8 HOURS PRN
Qty: 6 TABLET | Refills: 1 | Status: SHIPPED | OUTPATIENT
Start: 2020-03-11

## 2020-03-11 RX ADMIN — PROPOFOL 150 MG: 10 INJECTION, EMULSION INTRAVENOUS at 14:18

## 2020-03-11 RX ADMIN — FENTANYL CITRATE 50 MCG: 50 INJECTION, SOLUTION INTRAMUSCULAR; INTRAVENOUS at 14:35

## 2020-03-11 RX ADMIN — ONDANSETRON 4 MG: 2 INJECTION INTRAMUSCULAR; INTRAVENOUS at 14:30

## 2020-03-11 RX ADMIN — ROCURONIUM BROMIDE 50 MG: 10 INJECTION, SOLUTION INTRAVENOUS at 14:18

## 2020-03-11 RX ADMIN — OXYMETAZOLINE HYDROCHLORIDE 5 SPRAY: 0.05 SPRAY NASAL at 17:08

## 2020-03-11 RX ADMIN — SCOLOPAMINE TRANSDERMAL SYSTEM 1 PATCH: 1 PATCH, EXTENDED RELEASE TRANSDERMAL at 14:13

## 2020-03-11 RX ADMIN — FENTANYL CITRATE 50 MCG: 50 INJECTION, SOLUTION INTRAMUSCULAR; INTRAVENOUS at 14:30

## 2020-03-11 RX ADMIN — DEXAMETHASONE SODIUM PHOSPHATE 8 MG: 4 INJECTION, SOLUTION INTRA-ARTICULAR; INTRALESIONAL; INTRAMUSCULAR; INTRAVENOUS; SOFT TISSUE at 14:30

## 2020-03-11 RX ADMIN — SODIUM CHLORIDE, SODIUM LACTATE, POTASSIUM CHLORIDE, CALCIUM CHLORIDE: 600; 310; 30; 20 INJECTION, SOLUTION INTRAVENOUS at 14:12

## 2020-03-11 RX ADMIN — LIDOCAINE HYDROCHLORIDE 50 MG: 10 INJECTION, SOLUTION EPIDURAL; INFILTRATION; INTRACAUDAL; PERINEURAL at 14:18

## 2020-03-11 RX ADMIN — CEFAZOLIN SODIUM 1000 MG: 1 INJECTION, POWDER, FOR SOLUTION INTRAMUSCULAR; INTRAVENOUS at 14:45

## 2020-03-11 RX ADMIN — SODIUM CHLORIDE, SODIUM LACTATE, POTASSIUM CHLORIDE, CALCIUM CHLORIDE: 600; 310; 30; 20 INJECTION, SOLUTION INTRAVENOUS at 11:02

## 2020-03-11 RX ADMIN — LABETALOL HYDROCHLORIDE 5 MG: 5 INJECTION, SOLUTION INTRAVENOUS at 16:21

## 2020-03-11 RX ADMIN — Medication 0.5 MG: at 16:19

## 2020-03-11 RX ADMIN — PROPOFOL 50 MG: 10 INJECTION, EMULSION INTRAVENOUS at 14:35

## 2020-03-11 ASSESSMENT — ENCOUNTER SYMPTOMS
SINUS PAIN: 1
GASTROINTESTINAL NEGATIVE: 1
EYES NEGATIVE: 1
SINUS PRESSURE: 1
BACK PAIN: 1
ALLERGIC/IMMUNOLOGIC NEGATIVE: 1
RESPIRATORY NEGATIVE: 1

## 2020-03-11 ASSESSMENT — PAIN SCALES - GENERAL
PAINLEVEL_OUTOF10: 10
PAINLEVEL_OUTOF10: 8

## 2020-03-11 NOTE — BRIEF OP NOTE
Brief Postoperative Note  ______________________________________________________________    Patient: Valery Palomares  YOB: 1950  MRN: [id number]  Date of Procedure: 3/11/2020    Pre-Op Diagnosis: DEVIATED NASAL SEPTUM, NASAL TURBINATE HYPERTROPHY, CHRONIC PANSINUSITIS, NASAL OBSTRUCTION    Post-Op Diagnosis: Same       Procedure(s):  NASAL ENDOSCOPY, REPAIR OF NASAL SEPTUM, PARTIAL ETHMOIDECTOMY, CAUTERY TURBINATE MUCOSA, INTRAMURAL, THERAPEUTIC FRACTURE INFERIOR TURBINATES, OPEN MAXILLARY SINUS    Anesthesia: General    Surgeon(s):  Desirae Ahumada MD    Assistant: None    Estimated Blood Loss (mL): less than 50     Complications: None    Specimens:   ID Type Source Tests Collected by Time Destination   A : Products of LEFT sinus surgery Tissue Nose SURGICAL PATHOLOGY Desirae Ahumada MD 3/11/2020 1449    B : Products of RIGHT sinus surgery Tissue Nose SURGICAL PATHOLOGY Desirae Ahumada MD 3/11/2020 1501        Implants:  Implant Name Type Inv. Item Serial No.  Lot No. LRB No. Used   IMPL SINUS STEROID RELEASE PROPEL MINI Face/Chin/Dental/Voice IMPL SINUS STEROID RELEASE PROPEL MINI  [employer] [id number] Left 1   IMPL SINUS STEROID RELEASE PROPEL MINI Face/Chin/Dental/Voice IMPL SINUS STEROID RELEASE PROPEL MINI  [employer] [id number] Right 1         Drains: * No LDAs found *    Findings: Polypoid degeneration in both ethmoid systems likely the source of pressure                             headaches               Thickened mucoperiosteal lining within both maxillary sinuses               Dislocation of anterior septum off maxillary crest impinging on                            anterior left nasal airway.   Anterior columellar deflection to right                Bulky hypertrophic inferior turbinates bilaterally      Cristy Chacon MD  Date: 3/11/2020  Time: 3:53 PM

## 2020-03-11 NOTE — H&P
Neurological: Positive for headaches. Hematological: Negative. Psychiatric/Behavioral: Negative. Physical Exam  Constitutional:       Appearance: Normal appearance. HENT:      Head: Normocephalic and atraumatic. Right Ear: Tympanic membrane normal.      Left Ear: Tympanic membrane normal.      Nose: Septal deviation present. Right Turbinates: Enlarged. Left Turbinates: Enlarged. Mouth/Throat:      Pharynx: Oropharynx is clear. Eyes:      Conjunctiva/sclera: Conjunctivae normal.   Neck:      Musculoskeletal: Normal range of motion and neck supple. Cardiovascular:      Rate and Rhythm: Normal rate and regular rhythm. Pulmonary:      Effort: Pulmonary effort is normal.      Breath sounds: Normal breath sounds. Abdominal:      Palpations: Abdomen is soft. Musculoskeletal: Normal range of motion. Skin:     General: Skin is warm. Neurological:      General: No focal deficit present. Mental Status: She is alert and oriented to person, place, and time.          Assessment:  Chronic sinusitis  Deviated nasal septum  Turbinate hypertrophy  Chronic nasal obstruction    Plan:  Nasal septoplasty  Endoscopic sinus surgery    Yuliana Parson MD  3/11/2020

## 2020-03-11 NOTE — OP NOTE
With the patient under general endotracheal anesthesia she was prepped and draped in typical fashion for nasal surgery. Topical 4% cocaine soaked cottonoids were used to treat the nasal mucosa bilaterally. 1% lidocaine with 1 100,000 epinephrine was used to infiltrate the sub-mucoperichondrium and mucoperiosteum along with the columella of the nasal septum. An anterior hemitransfixion incision was made. The caudal portion of the septal cartilage was actually displaced into the right nares. Mucoperiosteum mucoperichondrium was carefully elevated off of the left side of the cartilaginous nasal septum. Inferiorly with the cartilage was dislocated off of the maxillary crest portions were excised in order to allow the cartilage to rotate back to more midline position over the maxillary crest.  Care was taken to maintain good caudal tip support although the patient had lost some of her caudal support secondary to the injury that led to the nasal septal deformity. A pocket was then created with sharp dissection in the columella and subsequently the incisions were closed with chromic suture allowing the caudal cartilage to rest more anatomically within the columella. Endoscopic sinus surgery was performed in a consistent fashion bilaterally. The procedure alternated from side to side utilizing Afrin-soaked cottonoids in order to minimize blood loss. Attention was first directed to the left middle meatus region. Using a 0 degree telescope the middle turbinate was medialized exposing the middle meatus and ethmoid bulla. The infundibular region was infiltrated with 1% lidocaine with 1-100,000 epinephrine. A sickle knife was used to make the infundibulotomy incision and the dividend was then debrided with cup forceps. This better expose the ethmoid bulla which was penetrated with a suction.   Septations were taken down in systematic fashion going from anterior to posterior in a more inferior plane and posterior

## 2020-03-11 NOTE — ANESTHESIA POSTPROCEDURE EVALUATION
Department of Anesthesiology  Postprocedure Note    Patient: Massimo Medina  MRN: [id number]  YOB: 1950  Date of evaluation: 3/11/2020  Time:  4:02 PM     Procedure Summary     Date:  03/11/20 Room / Location:  [facility] OR [address] [place] [address]    Anesthesia Start:  1435 Anesthesia Stop:  4345    Procedure:  NASAL ENDOSCOPY, REPAIR OF NASAL SEPTUM, PARTIAL ETHMOIDECTOMY, CAUTERY TURBINATE MUCOSA, INTRAMURAL, THERAPEUTIC FRACTURE INFERIOR TURBINATES, OPEN MAXILLARY SINUS (N/A Nose) Diagnosis:  (DEVIATED NASAL SEPTUM, NASAL TURBINATE HYPERTROPHY, CHRONIC PANSINUSITIS, NASAL OBSTRUCTION)    Surgeon:  Allen Barton MD Responsible Provider:      Anesthesia Type:  general ASA Status:  2          Anesthesia Type: general    Breonna Phase I:      Breonna Phase II:      Last vitals: Reviewed and per EMR flowsheets.        Anesthesia Post Evaluation    Patient location during evaluation: PACU  Patient participation: complete - patient participated  Level of consciousness: awake and alert  Pain score: 0  Airway patency: patent  Nausea & Vomiting: no nausea and no vomiting  Complications: no  Cardiovascular status: hemodynamically stable  Respiratory status: acceptable  Hydration status: euvolemic

## 2020-03-11 NOTE — ANESTHESIA PRE PROCEDURE
nonocclusive CAD    CERVICAL FUSION      [facility], Dr. Claudia Liao.  CHOLECYSTECTOMY      COLONOSCOPY      DILATATION, ESOPHAGUS      ENDOSCOPY, COLON, DIAGNOSTIC      FRACTURE SURGERY      GASTRIC FUNDOPLICATION  8738    HERNIA REPAIR      SABINE    HYSTERECTOMY      total    JOINT REPLACEMENT      bilateral knees and hip    TONSILLECTOMY      TONSILLECTOMY      TOTAL HIP ARTHROPLASTY  11/2014    TOTAL KNEE ARTHROPLASTY Bilateral     TOTAL KNEE ARTHROPLASTY Right 2002    Total knee replacement, right knee revision, Dr. Oc Bello 2002. Social History:    Social History     Tobacco Use    Smoking status: Never Smoker    Smokeless tobacco: Never Used   Substance Use Topics    Alcohol use: No                                Counseling given: Not Answered      Vital Signs (Current):   Vitals:    02/20/20 0938 03/11/20 1055   BP:  133/70   Pulse:  54   Resp:  18   Temp:  97.8 °F (36.6 °C)   SpO2:  97%   Weight: 147 lb (66.7 kg) 147 lb (66.7 kg)   Height: 5' (1.524 m) 5' (1.524 m)                                              BP Readings from Last 3 Encounters:   03/11/20 133/70   02/06/20 122/82   01/16/20 128/86       NPO Status: Time of last liquid consumption: 2300                        Time of last solid consumption: 2300                        Date of last liquid consumption: 03/10/20                        Date of last solid food consumption: 03/10/20    BMI:   Wt Readings from Last 3 Encounters:   03/11/20 147 lb (66.7 kg)   02/06/20 150 lb (68 kg)   01/16/20 150 lb 5 oz (68.2 kg)     Body mass index is 28.71 kg/m².     CBC:   Lab Results   Component Value Date    WBC 8.1 03/11/2020    RBC 4.01 03/11/2020    HGB 12.7 03/11/2020    HCT 40.5 03/11/2020    .0 03/11/2020    RDW 12.3 03/11/2020     03/11/2020       CMP:   Lab Results   Component Value Date     03/11/2020    K 3.7 03/11/2020     03/11/2020    CO2 30 03/11/2020    BUN 16 03/11/2020    CREATININE

## 2020-03-15 ENCOUNTER — APPOINTMENT (OUTPATIENT)
Dept: CT IMAGING | Age: 70
End: 2020-03-15
Payer: MEDICARE

## 2020-03-15 ENCOUNTER — APPOINTMENT (OUTPATIENT)
Dept: GENERAL RADIOLOGY | Age: 70
End: 2020-03-15
Payer: MEDICARE

## 2020-03-15 ENCOUNTER — HOSPITAL ENCOUNTER (EMERGENCY)
Age: 70
Discharge: ANOTHER HOSPITAL | End: 2020-03-15
Attending: EMERGENCY MEDICINE
Payer: MEDICARE

## 2020-03-15 VITALS
TEMPERATURE: 97.7 F | HEART RATE: 59 BPM | BODY MASS INDEX: 29.45 KG/M2 | HEIGHT: 60 IN | DIASTOLIC BLOOD PRESSURE: 90 MMHG | WEIGHT: 150 LBS | OXYGEN SATURATION: 93 % | SYSTOLIC BLOOD PRESSURE: 187 MMHG | RESPIRATION RATE: 19 BRPM

## 2020-03-15 LAB
ALBUMIN SERPL-MCNC: 4.7 G/DL (ref 3.5–5.2)
ALP BLD-CCNC: 103 U/L (ref 35–104)
ALT SERPL-CCNC: 12 U/L (ref 5–33)
ANION GAP SERPL CALCULATED.3IONS-SCNC: 15 MMOL/L (ref 7–19)
APTT: 27.2 SEC (ref 26–36.2)
AST SERPL-CCNC: 17 U/L (ref 5–32)
BASOPHILS ABSOLUTE: 0 K/UL (ref 0–0.2)
BASOPHILS RELATIVE PERCENT: 0.2 % (ref 0–1)
BILIRUB SERPL-MCNC: 1 MG/DL (ref 0.2–1.2)
BILIRUBIN URINE: NEGATIVE
BLOOD, URINE: NEGATIVE
BUN BLDV-MCNC: 12 MG/DL (ref 8–23)
CALCIUM SERPL-MCNC: 9.8 MG/DL (ref 8.8–10.2)
CHLORIDE BLD-SCNC: 82 MMOL/L (ref 98–111)
CLARITY: ABNORMAL
CO2: 28 MMOL/L (ref 22–29)
COLOR: YELLOW
CREAT SERPL-MCNC: 0.6 MG/DL (ref 0.5–0.9)
EOSINOPHILS ABSOLUTE: 0 K/UL (ref 0–0.6)
EOSINOPHILS RELATIVE PERCENT: 0.1 % (ref 0–5)
GFR NON-AFRICAN AMERICAN: >60
GLUCOSE BLD-MCNC: 140 MG/DL (ref 74–109)
GLUCOSE URINE: NEGATIVE MG/DL
HCT VFR BLD CALC: 36.4 % (ref 37–47)
HEMOGLOBIN: 12.3 G/DL (ref 12–16)
IMMATURE GRANULOCYTES #: 0.1 K/UL
INR BLD: 0.83 (ref 0.88–1.18)
KETONES, URINE: ABNORMAL MG/DL
LEUKOCYTE ESTERASE, URINE: NEGATIVE
LYMPHOCYTES ABSOLUTE: 1.8 K/UL (ref 1.1–4.5)
LYMPHOCYTES RELATIVE PERCENT: 12.9 % (ref 20–40)
MAGNESIUM: 1.9 MG/DL (ref 1.6–2.4)
MCH RBC QN AUTO: 31.7 PG (ref 27–31)
MCHC RBC AUTO-ENTMCNC: 33.8 G/DL (ref 33–37)
MCV RBC AUTO: 93.8 FL (ref 81–99)
MONOCYTES ABSOLUTE: 1.6 K/UL (ref 0–0.9)
MONOCYTES RELATIVE PERCENT: 11.5 % (ref 0–10)
NEUTROPHILS ABSOLUTE: 10.3 K/UL (ref 1.5–7.5)
NEUTROPHILS RELATIVE PERCENT: 74.6 % (ref 50–65)
NITRITE, URINE: NEGATIVE
PDW BLD-RTO: 11.7 % (ref 11.5–14.5)
PH UA: 7 (ref 5–8)
PLATELET # BLD: 303 K/UL (ref 130–400)
PMV BLD AUTO: 9.3 FL (ref 9.4–12.3)
POTASSIUM REFLEX MAGNESIUM: 3 MMOL/L (ref 3.5–5)
PROTEIN UA: NEGATIVE MG/DL
PROTHROMBIN TIME: 11.3 SEC (ref 12–14.6)
RBC # BLD: 3.88 M/UL (ref 4.2–5.4)
SODIUM BLD-SCNC: 125 MMOL/L (ref 136–145)
SODIUM URINE: 114 MMOL/L
SPECIFIC GRAVITY UA: 1.02 (ref 1–1.03)
TOTAL PROTEIN: 7.5 G/DL (ref 6.6–8.7)
URINE REFLEX TO CULTURE: ABNORMAL
UROBILINOGEN, URINE: 1 E.U./DL
WBC # BLD: 13.8 K/UL (ref 4.8–10.8)

## 2020-03-15 PROCEDURE — 70450 CT HEAD/BRAIN W/O DYE: CPT

## 2020-03-15 PROCEDURE — 80053 COMPREHEN METABOLIC PANEL: CPT

## 2020-03-15 PROCEDURE — 85730 THROMBOPLASTIN TIME PARTIAL: CPT

## 2020-03-15 PROCEDURE — 81003 URINALYSIS AUTO W/O SCOPE: CPT

## 2020-03-15 PROCEDURE — 96375 TX/PRO/DX INJ NEW DRUG ADDON: CPT

## 2020-03-15 PROCEDURE — 6360000002 HC RX W HCPCS: Performed by: EMERGENCY MEDICINE

## 2020-03-15 PROCEDURE — 85025 COMPLETE CBC W/AUTO DIFF WBC: CPT

## 2020-03-15 PROCEDURE — 96376 TX/PRO/DX INJ SAME DRUG ADON: CPT

## 2020-03-15 PROCEDURE — 72125 CT NECK SPINE W/O DYE: CPT

## 2020-03-15 PROCEDURE — 84300 ASSAY OF URINE SODIUM: CPT

## 2020-03-15 PROCEDURE — 36415 COLL VENOUS BLD VENIPUNCTURE: CPT

## 2020-03-15 PROCEDURE — 96365 THER/PROPH/DIAG IV INF INIT: CPT

## 2020-03-15 PROCEDURE — 70496 CT ANGIOGRAPHY HEAD: CPT

## 2020-03-15 PROCEDURE — 6360000004 HC RX CONTRAST MEDICATION: Performed by: EMERGENCY MEDICINE

## 2020-03-15 PROCEDURE — 2580000003 HC RX 258: Performed by: EMERGENCY MEDICINE

## 2020-03-15 PROCEDURE — 83735 ASSAY OF MAGNESIUM: CPT

## 2020-03-15 PROCEDURE — 70486 CT MAXILLOFACIAL W/O DYE: CPT

## 2020-03-15 PROCEDURE — 71045 X-RAY EXAM CHEST 1 VIEW: CPT

## 2020-03-15 PROCEDURE — 93005 ELECTROCARDIOGRAM TRACING: CPT | Performed by: EMERGENCY MEDICINE

## 2020-03-15 PROCEDURE — 99285 EMERGENCY DEPT VISIT HI MDM: CPT

## 2020-03-15 PROCEDURE — 85610 PROTHROMBIN TIME: CPT

## 2020-03-15 RX ORDER — ONDANSETRON 2 MG/ML
4 INJECTION INTRAMUSCULAR; INTRAVENOUS ONCE
Status: COMPLETED | OUTPATIENT
Start: 2020-03-15 | End: 2020-03-15

## 2020-03-15 RX ORDER — SODIUM CHLORIDE 9 MG/ML
1000 INJECTION, SOLUTION INTRAVENOUS CONTINUOUS
Status: DISCONTINUED | OUTPATIENT
Start: 2020-03-15 | End: 2020-03-16 | Stop reason: HOSPADM

## 2020-03-15 RX ORDER — MORPHINE SULFATE 4 MG/ML
4 INJECTION, SOLUTION INTRAMUSCULAR; INTRAVENOUS EVERY 30 MIN PRN
Status: COMPLETED | OUTPATIENT
Start: 2020-03-15 | End: 2020-03-15

## 2020-03-15 RX ORDER — DEXAMETHASONE SODIUM PHOSPHATE 10 MG/ML
4 INJECTION, SOLUTION INTRAMUSCULAR; INTRAVENOUS ONCE
Status: COMPLETED | OUTPATIENT
Start: 2020-03-15 | End: 2020-03-15

## 2020-03-15 RX ORDER — POTASSIUM CHLORIDE 7.45 MG/ML
10 INJECTION INTRAVENOUS ONCE
Status: COMPLETED | OUTPATIENT
Start: 2020-03-15 | End: 2020-03-15

## 2020-03-15 RX ADMIN — MORPHINE SULFATE 4 MG: 4 INJECTION, SOLUTION INTRAMUSCULAR; INTRAVENOUS at 20:33

## 2020-03-15 RX ADMIN — POTASSIUM CHLORIDE 10 MEQ: 10 INJECTION, SOLUTION INTRAVENOUS at 18:43

## 2020-03-15 RX ADMIN — ONDANSETRON 4 MG: 2 INJECTION INTRAMUSCULAR; INTRAVENOUS at 20:33

## 2020-03-15 RX ADMIN — DEXAMETHASONE SODIUM PHOSPHATE 4 MG: 10 INJECTION, SOLUTION INTRAMUSCULAR; INTRAVENOUS at 20:33

## 2020-03-15 RX ADMIN — SODIUM CHLORIDE 1000 ML: 9 INJECTION, SOLUTION INTRAVENOUS at 18:42

## 2020-03-15 RX ADMIN — MORPHINE SULFATE 4 MG: 4 INJECTION, SOLUTION INTRAMUSCULAR; INTRAVENOUS at 21:32

## 2020-03-15 RX ADMIN — IOPAMIDOL 90 ML: 755 INJECTION, SOLUTION INTRAVENOUS at 21:10

## 2020-03-15 ASSESSMENT — ENCOUNTER SYMPTOMS
SINUS PRESSURE: 0
BLOOD IN STOOL: 0
CONSTIPATION: 0
VOICE CHANGE: 0
FACIAL SWELLING: 0
SORE THROAT: 0
DIARRHEA: 0
APNEA: 0
SHORTNESS OF BREATH: 0
CHOKING: 0
ABDOMINAL PAIN: 0
NAUSEA: 0
EYE DISCHARGE: 0

## 2020-03-15 ASSESSMENT — PAIN DESCRIPTION - DESCRIPTORS: DESCRIPTORS: ACHING

## 2020-03-15 ASSESSMENT — PAIN SCALES - GENERAL
PAINLEVEL_OUTOF10: 6
PAINLEVEL_OUTOF10: 4
PAINLEVEL_OUTOF10: 6

## 2020-03-15 ASSESSMENT — PAIN DESCRIPTION - LOCATION: LOCATION: HEAD

## 2020-03-15 NOTE — ED TRIAGE NOTES
Pt with recent increase in cymbalta, just put on norco and keflex post op, and takes xanax at home. Pt had an ethmoidectomy 3/11.  Recent fall x 3 in last 24 hours

## 2020-03-15 NOTE — ED PROVIDER NOTES
[facility] EMERGENCY DEPT  eMERGENCY dEPARTMENT eNCOUnter      Pt Name: Braulio Rodriguez  MRN: [id number]  [place] 1950  Date of evaluation: 3/15/2020  Provider: MD Tiffany Freitas       Chief Complaint   Patient presents with    Altered Mental Status     per family, pt denies. pt A&O x 3 now         HISTORY OF PRESENT ILLNESS   (Location/Symptom, Timing/Onset,Context/Setting, Quality, Duration, Modifying Factors, Severity)  Note limiting factors. Braulio Rodriguez is a 78 y.o. female who presents to the emergency department with postoperative mental status changes. 80-year-old female brought in with increased falling and confusion. On the 11th of this month she underwent the following procedure by Dr. Radha Galvez, REPAIR OF NASAL SEPTUM, PARTIAL ETHMOIDECTOMY, CAUTERY TURBINATE MUCOSA, INTRAMURAL, THERAPEUTIC FRACTURE INFERIOR TURBINATES, OPEN MAXILLARY SINUS. Complaining of headache and facial pain. From my interview it seems that it is more than she expected. Not getting any history of chills or fever or infectious symptoms. She admits that family thought maybe she was overmedicated taking benzodiazepines and narcotics plus her recent surgery. She is not been consuming much nutrition or liquids. She denies vomiting or diarrhea. The history is provided by the patient and medical records. NursingNotes were reviewed. REVIEW OF SYSTEMS    (2-9 systems for level 4, 10 or more for level 5)     Review of Systems   Constitutional: Negative for chills and fever. HENT: Negative for congestion, drooling, facial swelling, nosebleeds, sinus pressure, sore throat and voice change. Postop 4 days. Eyes: Negative for discharge and visual disturbance. Respiratory: Negative for apnea, choking and shortness of breath. Cardiovascular: Negative for chest pain and leg swelling.    Gastrointestinal: Negative for abdominal pain, blood in stool, constipation, diarrhea and nausea. Genitourinary: Negative for dysuria and enuresis. Musculoskeletal: Negative for joint swelling. Skin: Negative for rash and wound. Neurological: Positive for weakness (Generalized). Negative for seizures, syncope and facial asymmetry. Psychiatric/Behavioral: Positive for confusion. Negative for behavioral problems, hallucinations and suicidal ideas. All other systems reviewed and are negative. A complete review of systems was performed and is negative except as noted above in the HPI. PAST MEDICAL HISTORY     Past Medical History:   Diagnosis Date    Anemia     Asthma     CAD (coronary artery disease)     Chest tightness or pressure 2/2/2014 2/2/2014  lexiscan Positive for inferior apical myocardial ischemia, EF 62%, 2% ischemic myocardium on stress, low risk findings, AUC indication 15, AUC score 4     Chronic back pain     Chronic kidney disease, stage III (moderate) (HCC)     GFR 40 mL/minute 7/10/15. no problems now    History of blood transfusion     Hyperlipidemia 1/6/2015    Hypertension     Osteoarthritis     Osteoporosis 8/2015    PONV (postoperative nausea and vomiting)     Torn tendon     LT HIP         SURGICAL HISTORY       Past Surgical History:   Procedure Laterality Date    ANKLE SURGERY Right     Right ankle fusion.  APPENDECTOMY      BACK SURGERY      BREAST SURGERY Bilateral     Bilateral breast reduction.  CARDIAC CATHETERIZATION  2/2/14  JDT    EF 50%    CARDIAC CATHETERIZATION  01/2016    Normal EF, mild nonocclusive CAD    CERVICAL FUSION      [facility], Dr. Claudia Liao.     CHOLECYSTECTOMY      COLONOSCOPY      DILATATION, ESOPHAGUS      ENDOSCOPY, COLON, DIAGNOSTIC      FRACTURE SURGERY      GASTRIC FUNDOPLICATION  5856    HERNIA REPAIR      SABINE    HYSTERECTOMY      total    JOINT REPLACEMENT      bilateral knees and hip    NASAL SINUS SURGERY N/A 3/11/2020    NASAL ENDOSCOPY, REPAIR OF NASAL SEPTUM, PARTIAL person, place, and time. GCS: GCS eye subscore is 4. GCS verbal subscore is 5. GCS motor subscore is 6. Cranial Nerves: No cranial nerve deficit. Sensory: Sensation is intact. Motor: Motor function is intact. Psychiatric:         Mood and Affect: Mood normal.         Behavior: Behavior normal.         DIAGNOSTIC RESULTS     EKG: All EKG's are interpreted by the Emergency Department Physician who either signs or Co-signs this chart in the absence of a cardiologist.    Sinus rhythm rate 49. RADIOLOGY:   Non-plain film images such as CT, Ultrasound and MRI are read by the radiologist. Plainradiographic images are visualized and preliminarily interpreted by the emergency physician with the below findings:    New the images and results. I discussed the CT of the head with the radiologist.  I have shown images to the patient and her son. The CTA has not been completed at the time of this dictation. Interpretation per the Radiologist below, if available at the time of this note:    XR CHEST PORTABLE   Final Result   1. No acute cardiopulmonary process. Hiatal hernia is again noted. Signed by Dr Bianca Blackmon on 3/15/2020 7:50 PM      CT Cervical Spine WO Contrast   Final Result   1. Degenerative and postsurgical changes noted. This is unchanged from   March 5, 2020. Signed by Dr Bianca Blackmon on 3/15/2020 7:28 PM      CT Facial Bones WO Contrast   Final Result   1. Suspicious for subtle defect in the floor of the anterior cranial   fossa coronal image 35 of 91   2. Postsurgical change in the ethmoid and maxillary sinuses. 3. Surgical packing is present. .   Signed by Dr Bianca Blackmon on 3/15/2020 7:16 PM      CT Head WO Contrast   Final Result   1. Intracranial hemorrhage is present in the right frontal lobe. Subarachnoid blood is noted along the right planum sphenoidale, a   trace amount of subarachnoid blood is present extending into the   anterior interhemispheric fissure.  The right hemorrhage [facility]):   Diagnosis management comments: 7:50 PM.  I have spoken with Dr. Nesha Kendall. ENT. He is aware of the patient. I have discussed the case with Dr. Licha Allen. Neurosurgery. He wants a CTA of the head rule out vascular injury or pseudoaneurysm or some pathology that would require the patient to be transferred for invasive care. This is underway. 8 PM.  Dr. Licha Allen has called back after reviewing the patient's CT images. And feels despite what the CTA might show or confirm that the patient will need transfer to a center with the ability to do invasive angiography. I discussed this with the son. 8:25 PM.  I spoken with [facility]. They will be calling back with some final information for transfer. Patient is complaining of severe headache going to administer some morphine hopefully that will help her blood pressure as well. Her sats actually 93% and she is mentating as the same as when she arrived. The son asked me after consulting with another family member who is a neurologist if it would be okay to give her some Decadron for the edema. I do not see any harm in that and placate their desire I will give 4 mg IV.    8:40 PM.  I discussed the case with [facility] and Dr. Julia Shaw trauma attending at [facility]. He has accepted the patien in transfer. [facility] is helping arrange [employer] transportation. I have estimated ETA of about 60 minutes. .        CONSULTS:  IP CONSULT TO NEUROSURGERY  IP CONSULT TO OTOLARYNGOLOGY    PROCEDURES:  Unless otherwise notedbelow, none     Procedures    FINAL IMPRESSION     1. Intracranial hemorrhage ([address]    2. Hyponatremia    3.  History of ethmoidectomy          DISPOSITION/PLAN   DISPOSITION        PATIENT REFERRED TO:  @FUP@    DISCHARGE MEDICATIONS:  New Prescriptions    No medications on file          (Please note that portions of this note were completed with a voice recognition program.  Efforts were made to edit the dictations butoccasionally words are mis-transcribed.)    Quinton Moss MD (electronically signed)  AttendingEmergency Physician          Miller Johnston MD  03/15/20 2041

## 2020-03-16 LAB
EKG P AXIS: 33 DEGREES
EKG P-R INTERVAL: 144 MS
EKG Q-T INTERVAL: 470 MS
EKG QRS DURATION: 106 MS
EKG QTC CALCULATION (BAZETT): 451 MS
EKG T AXIS: 24 DEGREES

## 2020-03-16 PROCEDURE — 93010 ELECTROCARDIOGRAM REPORT: CPT | Performed by: INTERNAL MEDICINE

## 2020-03-23 ENCOUNTER — OFFICE VISIT (OUTPATIENT)
Dept: OTOLARYNGOLOGY | Age: 70
End: 2020-03-23

## 2020-03-23 VITALS
HEIGHT: 60 IN | BODY MASS INDEX: 29.21 KG/M2 | SYSTOLIC BLOOD PRESSURE: 130 MMHG | DIASTOLIC BLOOD PRESSURE: 84 MMHG | WEIGHT: 148.8 LBS | TEMPERATURE: 97.8 F

## 2020-03-23 PROCEDURE — 99024 POSTOP FOLLOW-UP VISIT: CPT | Performed by: OTOLARYNGOLOGY

## 2020-03-23 NOTE — ASSESSMENT & PLAN NOTE
Endoscopic sinus surgery for bilateral partial ethmoidectomy and maxillary antrostomies performed on 3/11/2020  Return within next few days for sinus debridement  To begin with sinus irrigations

## 2020-03-23 NOTE — PROGRESS NOTES
78 y.o.  female presents today for postoperative follow-up. On March 11 she underwent septoplasty along with endoscopic sinus surgery. Unfortunately she became symptomatic with severe headache on March 15 prompting the visit to the emergency room where she was found to have an intracranial hemorrhage involving the frontal lobe. She was separately transported to [facility] where she was seen evaluated and treated. Was discharged on March 18 and has been recuperating at home without incident.   In regards to her nasal surgery she has done well no significant nosebleeds etc.    Family History   Problem Relation Age of Onset    Kidney Disease Mother     High Blood Pressure Mother     Heart Disease Father     Kidney Disease Father     Brain Cancer Brother      Social History     Socioeconomic History    Marital status: Single     Spouse name: None    Number of children: None    Years of education: None    Highest education level: None   Occupational History    None   Social Needs    Financial resource strain: None    Food insecurity     Worry: None     Inability: None    Transportation needs     Medical: None     Non-medical: None   Tobacco Use    Smoking status: Never Smoker    Smokeless tobacco: Never Used   Substance and Sexual Activity    Alcohol use: No    Drug use: No    Sexual activity: Never     Partners: Male   Lifestyle    Physical activity     Days per week: None     Minutes per session: None    Stress: None   Relationships    Social connections     Talks on phone: None     Gets together: None     Attends Muslim service: None     Active member of club or organization: None     Attends meetings of clubs or organizations: None     Relationship status: None    Intimate partner violence     Fear of current or ex partner: None     Emotionally abused: None     Physically abused: None     Forced sexual activity: None   Other Topics Concern    None   Social History Narrative    None

## 2020-03-23 NOTE — ASSESSMENT & PLAN NOTE
Turbinate reduction and outfracture on 3/11/2020  Nasal airway suctioned and debrided on today's visit  Appears to be much improved over preoperative state

## 2020-03-26 ENCOUNTER — TRANSCRIBE ORDERS (OUTPATIENT)
Dept: ADMINISTRATIVE | Facility: HOSPITAL | Age: 70
End: 2020-03-26

## 2020-03-26 ENCOUNTER — HOSPITAL ENCOUNTER (OUTPATIENT)
Dept: ULTRASOUND IMAGING | Facility: HOSPITAL | Age: 70
Discharge: HOME | End: 2020-03-26
Admitting: PHYSICIAN ASSISTANT

## 2020-03-26 ENCOUNTER — OFFICE VISIT (OUTPATIENT)
Dept: OTOLARYNGOLOGY | Age: 70
End: 2020-03-26
Payer: MEDICARE

## 2020-03-26 VITALS
DIASTOLIC BLOOD PRESSURE: 74 MMHG | WEIGHT: 148 LBS | HEIGHT: 60 IN | SYSTOLIC BLOOD PRESSURE: 112 MMHG | BODY MASS INDEX: 29.06 KG/M2

## 2020-03-26 DIAGNOSIS — I82.403 ACUTE EMBOLISM AND THROMBOSIS OF DEEP VEIN OF BOTH LOWER EXTREMITIES (HCC): Primary | ICD-10-CM

## 2020-03-26 DIAGNOSIS — T81.72XD: ICD-10-CM

## 2020-03-26 DIAGNOSIS — I82.409: ICD-10-CM

## 2020-03-26 DIAGNOSIS — I82.409 PROGRESSION OF DEEP VEIN THROMBOSIS (HCC): ICD-10-CM

## 2020-03-26 PROCEDURE — G8427 DOCREV CUR MEDS BY ELIG CLIN: HCPCS | Performed by: OTOLARYNGOLOGY

## 2020-03-26 PROCEDURE — G8417 CALC BMI ABV UP PARAM F/U: HCPCS | Performed by: OTOLARYNGOLOGY

## 2020-03-26 PROCEDURE — 31237 NSL/SINS NDSC SURG BX POLYPC: CPT | Performed by: OTOLARYNGOLOGY

## 2020-03-26 PROCEDURE — 93970 EXTREMITY STUDY: CPT

## 2020-03-26 PROCEDURE — 99024 POSTOP FOLLOW-UP VISIT: CPT | Performed by: OTOLARYNGOLOGY

## 2020-03-26 NOTE — ASSESSMENT & PLAN NOTE
Debridement of both surgically enlarged ethmoid cavities performed today under endoscopic guidance. Well-tolerated by patient.   Appears to have had good results although we will continue with debridements over the coming weeks

## 2020-03-26 NOTE — PROGRESS NOTES
problems now    History of blood transfusion     Hyperlipidemia 1/6/2015    Hypertension     Osteoarthritis     Osteoporosis 8/2015    PONV (postoperative nausea and vomiting)     Torn tendon     LT HIP     Past Surgical History:   Procedure Laterality Date    ANKLE SURGERY Right     Right ankle fusion.  APPENDECTOMY      BACK SURGERY      BREAST SURGERY Bilateral     Bilateral breast reduction.  CARDIAC CATHETERIZATION  2/2/14  JDT    EF 50%    CARDIAC CATHETERIZATION  01/2016    Normal EF, mild nonocclusive CAD    CERVICAL FUSION      The [address], Dr. Jean Marie Gaitan.  CHOLECYSTECTOMY      COLONOSCOPY      DILATATION, ESOPHAGUS      ENDOSCOPY, COLON, DIAGNOSTIC      FRACTURE SURGERY      GASTRIC FUNDOPLICATION  0862    HERNIA REPAIR      SABINE    HYSTERECTOMY      total    JOINT REPLACEMENT      bilateral knees and hip    NASAL SINUS SURGERY N/A 3/11/2020    NASAL ENDOSCOPY, REPAIR OF NASAL SEPTUM, PARTIAL ETHMOIDECTOMY, CAUTERY TURBINATE MUCOSA, INTRAMURAL, THERAPEUTIC FRACTURE INFERIOR TURBINATES, OPEN MAXILLARY SINUS performed by Rasheed Ding MD at [id number] [place] [address] TOTAL HIP ARTHROPLASTY  11/2014    TOTAL KNEE ARTHROPLASTY Bilateral     TOTAL KNEE ARTHROPLASTY Right 2002    Total knee replacement, right knee revision, Dr. Josee Louie 2002. REVIEW OF SYSTEMS:  all other systems reviewed and are negative  General Health: no change in health status since last visit  Nose: sinus pain: No, nose bleeds: No, congestion: Yes and obstruction: No       Comments:     PHYSICAL EXAM:    /74   Ht 5' (1.524 m)   Wt 148 lb (67.1 kg)   BMI 28.90 kg/m²   Body mass index is 28.9 kg/m².     General Appearance: well developed , well nourished and no distress  Head/ Face: normocephalic and atraumatic  Nose: see endoscopy report  Psych/ Mood: cooperative and no depression, anxiety or agitation    Assessment & Plan:    Problem List Items

## 2020-04-02 ENCOUNTER — OFFICE VISIT (OUTPATIENT)
Dept: OTOLARYNGOLOGY | Age: 70
End: 2020-04-02
Payer: MEDICARE

## 2020-04-02 VITALS
TEMPERATURE: 97.9 F | HEIGHT: 60 IN | SYSTOLIC BLOOD PRESSURE: 120 MMHG | WEIGHT: 146.2 LBS | DIASTOLIC BLOOD PRESSURE: 68 MMHG | BODY MASS INDEX: 28.7 KG/M2

## 2020-04-02 PROCEDURE — G8427 DOCREV CUR MEDS BY ELIG CLIN: HCPCS | Performed by: OTOLARYNGOLOGY

## 2020-04-02 PROCEDURE — 31237 NSL/SINS NDSC SURG BX POLYPC: CPT | Performed by: OTOLARYNGOLOGY

## 2020-04-02 PROCEDURE — G8417 CALC BMI ABV UP PARAM F/U: HCPCS | Performed by: OTOLARYNGOLOGY

## 2020-04-20 LAB
ANION GAP SERPL CALCULATED.3IONS-SCNC: 11 MMOL/L (ref 7–19)
BUN BLDV-MCNC: 21 MG/DL (ref 8–23)
CALCIUM SERPL-MCNC: 9.5 MG/DL (ref 8.8–10.2)
CHLORIDE BLD-SCNC: 99 MMOL/L (ref 98–111)
CO2: 30 MMOL/L (ref 22–29)
CREAT SERPL-MCNC: 0.7 MG/DL (ref 0.5–0.9)
GFR NON-AFRICAN AMERICAN: >60
GLUCOSE BLD-MCNC: 109 MG/DL (ref 74–109)
POTASSIUM SERPL-SCNC: 4 MMOL/L (ref 3.5–5)
SODIUM BLD-SCNC: 140 MMOL/L (ref 136–145)

## 2020-04-21 ENCOUNTER — HOSPITAL ENCOUNTER (OUTPATIENT)
Dept: CT IMAGING | Age: 70
Discharge: HOME | End: 2020-04-21
Payer: MEDICARE

## 2020-04-21 PROCEDURE — 70450 CT HEAD/BRAIN W/O DYE: CPT

## 2020-05-07 ENCOUNTER — OFFICE VISIT (OUTPATIENT)
Dept: OTOLARYNGOLOGY | Age: 70
End: 2020-05-07
Payer: MEDICARE

## 2020-05-07 VITALS
WEIGHT: 153 LBS | DIASTOLIC BLOOD PRESSURE: 78 MMHG | HEIGHT: 60 IN | BODY MASS INDEX: 30.04 KG/M2 | SYSTOLIC BLOOD PRESSURE: 136 MMHG

## 2020-05-07 PROCEDURE — 3017F COLORECTAL CA SCREEN DOC REV: CPT | Performed by: OTOLARYNGOLOGY

## 2020-05-07 PROCEDURE — G8417 CALC BMI ABV UP PARAM F/U: HCPCS | Performed by: OTOLARYNGOLOGY

## 2020-05-07 PROCEDURE — 1090F PRES/ABSN URINE INCON ASSESS: CPT | Performed by: OTOLARYNGOLOGY

## 2020-05-07 PROCEDURE — 31231 NASAL ENDOSCOPY DX: CPT | Performed by: OTOLARYNGOLOGY

## 2020-05-07 PROCEDURE — 1123F ACP DISCUSS/DSCN MKR DOCD: CPT | Performed by: OTOLARYNGOLOGY

## 2020-05-07 PROCEDURE — G8399 PT W/DXA RESULTS DOCUMENT: HCPCS | Performed by: OTOLARYNGOLOGY

## 2020-05-07 PROCEDURE — 4040F PNEUMOC VAC/ADMIN/RCVD: CPT | Performed by: OTOLARYNGOLOGY

## 2020-05-07 PROCEDURE — 1036F TOBACCO NON-USER: CPT | Performed by: OTOLARYNGOLOGY

## 2020-05-07 PROCEDURE — G8427 DOCREV CUR MEDS BY ELIG CLIN: HCPCS | Performed by: OTOLARYNGOLOGY

## 2020-05-07 PROCEDURE — 99024 POSTOP FOLLOW-UP VISIT: CPT | Performed by: OTOLARYNGOLOGY

## 2020-05-07 NOTE — PROGRESS NOTES
risk findings, AUC indication 15, AUC score 4     Chronic back pain     Chronic kidney disease, stage III (moderate) (HCC)     GFR 40 mL/minute 7/10/15. no problems now    History of blood transfusion     Hyperlipidemia 1/6/2015    Hypertension     Osteoarthritis     Osteoporosis 8/2015    PONV (postoperative nausea and vomiting)     Torn tendon     LT HIP     Past Surgical History:   Procedure Laterality Date    ANKLE SURGERY Right     Right ankle fusion.  APPENDECTOMY      BACK SURGERY      BREAST SURGERY Bilateral     Bilateral breast reduction.  CARDIAC CATHETERIZATION  2/2/14  JDT    EF 50%    CARDIAC CATHETERIZATION  01/2016    Normal EF, mild nonocclusive CAD    CERVICAL FUSION      The [address] [place] [address], Dr. Liam Ferguson.  CHOLECYSTECTOMY      COLONOSCOPY      DILATATION, ESOPHAGUS      ENDOSCOPY, COLON, DIAGNOSTIC      FRACTURE SURGERY      GASTRIC FUNDOPLICATION  1223    HERNIA REPAIR      SABINE    HYSTERECTOMY      total    JOINT REPLACEMENT      bilateral knees and hip    NASAL SINUS SURGERY N/A 3/11/2020    NASAL ENDOSCOPY, REPAIR OF NASAL SEPTUM, PARTIAL ETHMOIDECTOMY, CAUTERY TURBINATE MUCOSA, INTRAMURAL, THERAPEUTIC FRACTURE INFERIOR TURBINATES, OPEN MAXILLARY SINUS performed by Raul Callaway MD at [address] [address] TOTAL HIP ARTHROPLASTY  11/2014    TOTAL KNEE ARTHROPLASTY Bilateral     TOTAL KNEE ARTHROPLASTY Right 2002    Total knee replacement, right knee revision, Dr. Alpesh Wilson 2002.           REVIEW OF SYSTEMS:  all other systems reviewed and are negative  General Health: no change in health status since last visit  Sleep: denies related complaints nasal obstruction: No mouth breathing: No  Nose: sinus pain: No, sinus pressure: No, nose bleeds: No, congestion: No and obstruction: No       Comments:     PHYSICAL EXAM:    /78   Ht 5' (1.524 m)   Wt 153 lb (69.4 kg)   BMI 29.88 kg/m²   Body mass index is 29.88

## 2020-05-07 NOTE — ASSESSMENT & PLAN NOTE
All surgically enlarged sinus cavities are completely clear with a widely patent maxillary ostia bilaterally.   Overall has an excellent surgical result in regards to her chronic sinus disease  Can continue with irrigations and saline spray along with the nasal steroid spray

## 2020-07-06 ENCOUNTER — OFFICE VISIT (OUTPATIENT)
Dept: VASCULAR SURGERY | Age: 70
End: 2020-07-06
Payer: MEDICARE

## 2020-07-06 VITALS
RESPIRATION RATE: 18 BRPM | DIASTOLIC BLOOD PRESSURE: 78 MMHG | SYSTOLIC BLOOD PRESSURE: 132 MMHG | OXYGEN SATURATION: 98 % | HEART RATE: 54 BPM

## 2020-07-06 PROCEDURE — 1036F TOBACCO NON-USER: CPT | Performed by: PHYSICIAN ASSISTANT

## 2020-07-06 PROCEDURE — G8417 CALC BMI ABV UP PARAM F/U: HCPCS | Performed by: PHYSICIAN ASSISTANT

## 2020-07-06 PROCEDURE — 4040F PNEUMOC VAC/ADMIN/RCVD: CPT | Performed by: PHYSICIAN ASSISTANT

## 2020-07-06 PROCEDURE — 99204 OFFICE O/P NEW MOD 45 MIN: CPT | Performed by: PHYSICIAN ASSISTANT

## 2020-07-06 PROCEDURE — G8399 PT W/DXA RESULTS DOCUMENT: HCPCS | Performed by: PHYSICIAN ASSISTANT

## 2020-07-06 PROCEDURE — G8427 DOCREV CUR MEDS BY ELIG CLIN: HCPCS | Performed by: PHYSICIAN ASSISTANT

## 2020-07-06 PROCEDURE — 3017F COLORECTAL CA SCREEN DOC REV: CPT | Performed by: PHYSICIAN ASSISTANT

## 2020-07-06 PROCEDURE — 1123F ACP DISCUSS/DSCN MKR DOCD: CPT | Performed by: PHYSICIAN ASSISTANT

## 2020-07-06 PROCEDURE — 1090F PRES/ABSN URINE INCON ASSESS: CPT | Performed by: PHYSICIAN ASSISTANT

## 2020-07-06 NOTE — PROGRESS NOTES
Patient Care Team:  Kip Zelaya DO [facility] PCP - [facility] - [facility] Empaneled Provider  John Abernathy MD (Cardiology)  Claire Solano MD as Consulting Physician (Family Medicine)  MD Summer Royal MD as Consulting Physician (Otolaryngology)  Cheryl Myers PA-C as Physician Assistant (Physician Assistant Medical)      History and Physical    Mrs. Valderrama is a 77 yo female who has a history of HTN, hyperlipidemia, CAD and tobacco abuse. She presents today as a referral from Dr. Shannon Cabrera for evaluation of peripheral vascular disease of the lower extremities. She reports that about mid march she had sinus surgery and then developed a frontal lobe hemorrhage. She had a work up that was negative for her bleed and this was thought to be a complication of the sinus surgery. She noticed not ling after this swelling and pain in her lower legs. She has had multiple foot surgeries on her right foot in the past as well as bilateral TKR. She uses a cane for ambulating at the present time. She is doing PT 3 x weekly. She denies any lifestyle limiting claudication at this time. No IRP nor non healing wounds. Old records have been obtained, reviewed, and summarized.     Terrie Montez is a 78 y.o. female with the following history reviewed and recorded in [employer]:  Patient Active Problem List    Diagnosis Date Noted    Deviated nasal septum 02/06/2020    Nasal turbinate hypertrophy 02/06/2020    Chronic pansinusitis 02/06/2020    Nasal obstruction 02/06/2020    Bradycardia 12/10/2019    Statin intolerance 12/10/2018    Abnormal nuclear cardiac imaging test     Positive cardiac stress test     Essential hypertension     Hypercholesteremia     Hyperlipidemia 01/06/2015    CAD (coronary artery disease)     Chest tightness or pressure 02/02/2014 2/2/2014  lexiscan Positive for inferior apical myocardial ischemia, EF 62%, 2% ischemic myocardium on stress, low risk findings, AUC indication 15, AUC score 4  2/2/2014  Cath  Mild to moderate distal disease, normal LVFX  1/21/2016  lexiscan  Positive for inferor lateral MI + myocardial ischemia, EF 69%, 12/11% ischemic myocardium on stress, low risk findings, AUC indication 15, AUC score 4  1/22/16  Cath  Mild to moderate distal disease, normal LVFX         Current Outpatient Medications   Medication Sig Dispense Refill    Cyanocobalamin (B-12 COMPLIANCE INJECTION IJ) Inject as directed      ondansetron (ZOFRAN ODT) 8 MG TBDP disintegrating tablet Place 1 tablet under the tongue every 8 hours as needed for Nausea or Vomiting 6 tablet 1    cloNIDine (CATAPRES) 0.1 MG tablet Take one tablet when BP is over 170/90. May take up to 3 tablets daily as needed. 90 tablet 3    hydrochlorothiazide (HYDRODIURIL) 25 MG tablet Take 25 mg by mouth daily      irbesartan (AVAPRO) 300 MG tablet Take 300 mg by mouth daily      oxybutynin (DITROPAN) 5 MG tablet Take 5 mg by mouth daily      montelukast (SINGULAIR) 10 MG tablet Take 10 mg by mouth daily      metoprolol tartrate (LOPRESSOR) 25 MG tablet Take 0.5 tablets by mouth 2 times daily 90 tablet 3    ALPRAZolam (XANAX) 0.25 MG tablet Take 0.25 mg by mouth nightly as needed.  PROVENTIL  (90 BASE) MCG/ACT inhaler Inhale 2 puffs into the lungs every 6 hours as needed        No current facility-administered medications for this visit.       Allergies: Meloxicam; Diovan [valsartan]; and Demeclocycline hcl  Past Medical History:   Diagnosis Date    Anemia     Asthma     CAD (coronary artery disease)     Chest tightness or pressure 2/2/2014 2/2/2014  lexiscan Positive for inferior apical myocardial ischemia, EF 62%, 2% ischemic myocardium on stress, low risk findings, AUC indication 15, AUC score 4     Chronic back pain     Chronic kidney disease, stage III (moderate) (HCC)     GFR 40 mL/minute 7/10/15. no problems now    History of blood transfusion     Hyperlipidemia 1/6/2015    Hypertension     Osteoarthritis     Osteoporosis 8/2015    PONV (postoperative nausea and vomiting)     Torn tendon     LT HIP     Past Surgical History:   Procedure Laterality Date    ANKLE SURGERY Right     Right ankle fusion.  APPENDECTOMY      BACK SURGERY      BREAST SURGERY Bilateral     Bilateral breast reduction.  CARDIAC CATHETERIZATION  2/2/14  JDT    EF 50%    CARDIAC CATHETERIZATION  01/2016    Normal EF, mild nonocclusive CAD    CERVICAL FUSION      The [address] [place] [address], Dr. Jen Tom.  CHOLECYSTECTOMY      COLONOSCOPY      DILATATION, ESOPHAGUS      ENDOSCOPY, COLON, DIAGNOSTIC      FRACTURE SURGERY      GASTRIC FUNDOPLICATION  7172    HERNIA REPAIR      SABINE    HYSTERECTOMY      total    JOINT REPLACEMENT      bilateral knees and hip    NASAL SINUS SURGERY N/A 3/11/2020    NASAL ENDOSCOPY, REPAIR OF NASAL SEPTUM, PARTIAL ETHMOIDECTOMY, CAUTERY TURBINATE MUCOSA, INTRAMURAL, THERAPEUTIC FRACTURE INFERIOR TURBINATES, OPEN MAXILLARY SINUS performed by Amy Bautista MD at [address] [address] TOTAL HIP ARTHROPLASTY  11/2014    TOTAL KNEE ARTHROPLASTY Bilateral     TOTAL KNEE ARTHROPLASTY Right 2002    Total knee replacement, right knee revision, Dr. Montserrat Ni 2002. Family History   Problem Relation Age of Onset    Kidney Disease Mother     High Blood Pressure Mother     Other Mother         AAA    Heart Disease Father     Kidney Disease Father     Brain Cancer Brother     Stroke Paternal Grandfather      Social History     Tobacco Use    Smoking status: Passive Smoke Exposure - Never Smoker    Smokeless tobacco: Never Used   Substance Use Topics    Alcohol use: No       Review of Systems    Constitutional - no significant activity change, appetite change, or unexpected weight change. No fever or chills. No diaphoresis or significant fatigue. HENT - no significant rhinorrhea or epistaxis.   No tinnitus or significant hearing loss. Eyes - no sudden vision change or amaurosis. Respiratory - no significant shortness of breath, wheezing, or stridor. No apnea, cough, or chest tightness associated with shortness of breath. Cardiovascular - no chest pain, syncope, or significant dizziness. No palpitations or significant leg swelling. (see HPI)  Gastrointestinal - no abdominal swelling or pain. No blood in stool. No severe constipation, diarrhea, nausea, or vomiting. Genitourinary - No difficulty urinating, dysuria, frequency, or urgency. No flank pain or hematuria. Musculoskeletal - no back pain, gait disturbance, or myalgia. Skin - no color change, rash, pallor, or new wound. Neurologic - no dizziness, facial asymmetry, or light headedness. No seizures. No speech difficulty or lateralizing weakness. Hematologic - no easy bruising or excessive bleeding. Psychiatric - no severe anxiety or nervousness. No confusion. All other review of systems are negative. Physical Exam    /78 (Site: Right Upper Arm, Position: Sitting, Cuff Size: Medium Adult)   Pulse 54   Resp 18   SpO2 98%     Constitutional - well developed, well nourished. No diaphoresis or acute distress. HENT - head normocephalic. Right external ear canal appears normal.  Left external ear canal appears normal.  Septum appears midline. Eyes - conjunctiva normal.  EOMS normal.  No exudate. No icterus. Neck- ROM appears normal, no tracheal deviation. Cardiovascular - Regular rate and rhythm. Heart sounds are normal.  No murmur, rub, or gallop. Carotid pulses are 2+ to palpation bilaterally without bruit. Extremities - Radial and ulnar pulses are 2+ to palpation bilaterally. Femoral pulses 2+. DP and PT pulses are palpable. No cyanosis, clubbing, or significant edema. No signs atheroembolic event. Mild right ankle swelling noted. Pulmonary - effort appears normal.  No respiratory distress.     Lungs - Breath sounds normal. No wheezes or rales. GI - Abdomen - soft, non tender, bowel sounds X 4 quadrants. No guarding or rebound tenderness. No distension or palpable mass. Genitourinary - deferred. Musculoskeletal - ROM appears normal.  No significant edema. Neurologic - alert and oriented X 3. Physiologic. Skin - warm, dry, and intact. No rash, erythema, or pallor. Psychiatric - mood, affect, and behavior appear normal.  Judgment and thought processes appear normal.    Risk factors for atherosclerosis of all vascular beds have been reviewed with the patient including:  Family history, tobacco abuse in all forms, elevated cholesterol, hyperlipidemia, and diabetes. Lower extremity arterial study:  6/19/2020 at [facility]  Right OLIVA 1.20, 1.21 with exercise, Left OLIVA 1.16, no change with exercise. Individual films reviewed: Yes. Test results were reviewed with the patient. Assessment      1.  Athersclerosis of bilateral LE native arteries with claudication      Plan    Recommend ASA EC 81 mg daily (seee is going to check with her Neurologist to she is okay before starting due per history of bleed)  Strongly encourage statin therapy  Good skin care/checks daily  Recommend no smoking  We discussed a low fat, low cholesterol diet   Discussed walking as much as possible    Since she has no lifestyle limiting claudication we will plan to repeat MAEGAN in 6 months or sooner if claudication worsens, patient develops wound or IRP

## 2020-10-08 ENCOUNTER — HOSPITAL ENCOUNTER (OUTPATIENT)
Dept: WOMENS IMAGING | Age: 70
Discharge: HOME | End: 2020-10-08
Payer: MEDICARE

## 2020-10-08 PROCEDURE — 77063 BREAST TOMOSYNTHESIS BI: CPT

## 2020-11-04 ENCOUNTER — OFFICE VISIT (OUTPATIENT)
Dept: GASTROENTEROLOGY | Facility: CLINIC | Age: 70
End: 2020-11-04

## 2020-11-04 VITALS
HEIGHT: 60 IN | OXYGEN SATURATION: 97 % | DIASTOLIC BLOOD PRESSURE: 92 MMHG | WEIGHT: 154 LBS | HEART RATE: 62 BPM | TEMPERATURE: 97.1 F | SYSTOLIC BLOOD PRESSURE: 162 MMHG | BODY MASS INDEX: 30.23 KG/M2

## 2020-11-04 DIAGNOSIS — Z86.010 HX OF COLONIC POLYPS: Primary | ICD-10-CM

## 2020-11-04 DIAGNOSIS — R11.0 NAUSEA: ICD-10-CM

## 2020-11-04 DIAGNOSIS — Z83.71 FAMILY HISTORY OF POLYPS IN THE COLON: ICD-10-CM

## 2020-11-04 DIAGNOSIS — Z98.890 HISTORY OF NISSEN FUNDOPLICATION: ICD-10-CM

## 2020-11-04 DIAGNOSIS — R13.19 OTHER DYSPHAGIA: ICD-10-CM

## 2020-11-04 PROBLEM — Z83.719 FAMILY HISTORY OF POLYPS IN THE COLON: Status: ACTIVE | Noted: 2020-11-04

## 2020-11-04 PROCEDURE — 99213 OFFICE O/P EST LOW 20 MIN: CPT | Performed by: NURSE PRACTITIONER

## 2020-11-04 RX ORDER — MONTELUKAST SODIUM 10 MG/1
10 TABLET ORAL NIGHTLY
COMMUNITY

## 2020-11-04 RX ORDER — DULOXETIN HYDROCHLORIDE 30 MG/1
90 CAPSULE, DELAYED RELEASE ORAL DAILY
COMMUNITY

## 2020-11-04 RX ORDER — PHENOL 1.4 %
600 AEROSOL, SPRAY (ML) MUCOUS MEMBRANE DAILY
COMMUNITY
End: 2021-01-28

## 2020-11-04 RX ORDER — MULTIPLE VITAMINS W/ MINERALS TAB 9MG-400MCG
1 TAB ORAL DAILY
COMMUNITY
End: 2021-01-28

## 2020-11-04 RX ORDER — ALPRAZOLAM 0.25 MG/1
0.25 TABLET ORAL 2 TIMES DAILY PRN
COMMUNITY

## 2020-11-04 RX ORDER — METOPROLOL SUCCINATE 25 MG/1
25 TABLET, EXTENDED RELEASE ORAL DAILY
COMMUNITY

## 2020-11-04 RX ORDER — HYDROCODONE BITARTRATE AND ACETAMINOPHEN 10; 325 MG/1; MG/1
1 TABLET ORAL EVERY 6 HOURS PRN
COMMUNITY

## 2020-11-04 RX ORDER — SODIUM, POTASSIUM,MAG SULFATES 17.5-3.13G
1 SOLUTION, RECONSTITUTED, ORAL ORAL EVERY 12 HOURS
Qty: 2 BOTTLE | Refills: 0 | Status: ON HOLD | OUTPATIENT
Start: 2020-11-04 | End: 2020-12-17

## 2020-11-04 RX ORDER — POTASSIUM CHLORIDE 750 MG/1
10 CAPSULE, EXTENDED RELEASE ORAL 2 TIMES DAILY
COMMUNITY
End: 2020-11-04

## 2020-11-04 RX ORDER — IRBESARTAN 300 MG/1
300 TABLET ORAL NIGHTLY
COMMUNITY

## 2020-11-04 RX ORDER — CLONIDINE HYDROCHLORIDE 0.1 MG/1
0.1 TABLET ORAL AS NEEDED
COMMUNITY

## 2020-11-04 RX ORDER — HYDROCHLOROTHIAZIDE 25 MG/1
25 TABLET ORAL DAILY
COMMUNITY

## 2020-11-04 NOTE — PROGRESS NOTES
"Chief Complaint   Patient presents with   • Colon Cancer Screening     last colon 07/2015- polyp removed/due 07/2020   • GI Problem     pt says she has been having problems swallowing her pills- last endo 07/2016     Subjective   HPI  Eliza Corbin is a 70 y.o. female who presents as a referral for preventative maintenance. She hasNo change in bowels. No wt loss. No BRBPR. No melena. There is no family hx for colon cancer. No abdominal pain. There was no Cologuard screening this year.  The patient's last colonoscopy was performed on 7/23/2015 findings of polyp    The patient states she has recently been having difficulty swallowing \"mostly pills and I have to make myself vomit.\" History of Nissen Fundoplication. Nausea worse recently within the past few months. The patient denies any  vomiting, epigastric pain, pyrosis or hematemesis.  The patient denies any fever or chills.  Denies any melena or hematochezia.  Denies any unintentional weight loss or loss of appetite.      Past Medical History:   Diagnosis Date   • Anxiety    • Chronic back pain    • GERD (gastroesophageal reflux disease)    • Hiatal hernia    • HTN (hypertension)    • Hypercholesteremia    • IBS (irritable bowel syndrome)    • Iron deficiency anemia    • Stroke (CMS/HCC) 03/2020   • Vitamin D deficiency      Past Surgical History:   Procedure Laterality Date   • ANKLE SURGERY     • APPENDECTOMY     • BILATERAL BREAST REDUCTION     • CARDIAC CATHETERIZATION     • CERVICAL FUSION     • CHOLECYSTECTOMY     • COLONOSCOPY  07/23/2015   • COLONOSCOPY  01/30/2008    no active colitis, mild non-specific chronic inflammation   • HIP BIPOLAR REPLACEMENT     • HYSTERECTOMY     • NISSEN FUNDOPLICATION     • REPLACEMENT TOTAL KNEE     • SINUS SURGERY  03/2020   • TONSILLECTOMY     • UPPER GASTROINTESTINAL ENDOSCOPY  07/23/2015   • UPPER GASTROINTESTINAL ENDOSCOPY  07/18/2016   • UPPER GASTROINTESTINAL ENDOSCOPY  05/13/2009    neg Mercedes's, gastric and GEJ " "junction bx's with mild to mod chronic inflammation, mod HH- Dr. Garland       Current Outpatient Medications:   •  ALPRAZolam (XANAX) 0.25 MG tablet, Take 0.25 mg by mouth 2 (Two) Times a Day As Needed for Anxiety., Disp: , Rfl:   •  calcium carbonate (OS-ESCOBAR) 600 MG tablet, Take 600 mg by mouth Daily., Disp: , Rfl:   •  cloNIDine (CATAPRES) 0.1 MG tablet, Take 0.1 mg by mouth 2 (Two) Times a Day., Disp: , Rfl:   •  DULoxetine (CYMBALTA) 30 MG capsule, Take 30 mg by mouth 3 (Three) Times a Day., Disp: , Rfl:   •  hydroCHLOROthiazide (HYDRODIURIL) 25 MG tablet, Take 25 mg by mouth Daily., Disp: , Rfl:   •  HYDROcodone-acetaminophen (NORCO)  MG per tablet, Take 1 tablet by mouth Every 6 (Six) Hours As Needed for Moderate Pain ., Disp: , Rfl:   •  irbesartan (AVAPRO) 300 MG tablet, Take 300 mg by mouth Every Night., Disp: , Rfl:   •  metoprolol succinate XL (TOPROL-XL) 25 MG 24 hr tablet, Take 25 mg by mouth Daily., Disp: , Rfl:   •  montelukast (SINGULAIR) 10 MG tablet, Take 10 mg by mouth Every Night., Disp: , Rfl:   •  multivitamin with minerals (MULTIVITAMIN WOMEN 50+ PO), Take 1 tablet by mouth Daily., Disp: , Rfl:   •  sodium-potassium-magnesium sulfates (Suprep Bowel Prep Kit) 17.5-3.13-1.6 GM/177ML solution oral solution, Take 1 bottle by mouth Every 12 (Twelve) Hours. Split dose prep as directed by office instructions provided.  2 bottles = one kit., Disp: 2 bottle, Rfl: 0  Allergies   Allergen Reactions   • Diovan [Valsartan] Other (See Comments)     \"kidney failure\"   • Meloxicam Other (See Comments)     \"kidney failure\"   • Declomycin [Demeclocycline] Rash     Social History     Socioeconomic History   • Marital status: Legally      Spouse name: Not on file   • Number of children: Not on file   • Years of education: Not on file   • Highest education level: Not on file   Tobacco Use   • Smoking status: Never Smoker   • Smokeless tobacco: Never Used   Substance and Sexual Activity   • Alcohol " "use: Yes     Comment: occ   • Drug use: Never     Family History   Problem Relation Age of Onset   • Colon polyps Father    • Colon cancer Neg Hx    • Esophageal cancer Neg Hx        REVIEW OF SYSTEMS  General: well appearing, no fever chills or sweats, no unexplained wt loss  HEENT: no acute visual or hearing disturbances  Cardiovascular: No chest pain or palpitations  Pulmonary: No shortness of breath, coughing, wheezing or hemoptysis  : No burning, urgency, hematuria, or dysuria  Musculoskeletal: No joint pain or stiffness  Peripheral: no edema  Skin: No lesions or rashes  Neuro: No dizziness, headaches, stroke, syncope  Endocrine: No hot or cold intolerances  Hematological: No blood dyscrasias    Objective   Vitals:    11/04/20 1338   BP: 162/92   Pulse: 62   Temp: 97.1 °F (36.2 °C)   SpO2: 97%   Weight: 69.9 kg (154 lb)   Height: 152.4 cm (60\")     Body mass index is 30.08 kg/m².    PHYSICAL EXAM  General: age appropriate well nourished well appearing, no acute distress  Head: normocephalic and atraumatic  Global assessment-supple  Neck-No JVD noted, no lymphadenopathy  Pulmonary-clear to auscultation bilaterally, normal respiratory effort  Cardiovascular-normal rate and rhythm, normal heart sounds, S1 and S2 noted  Abdomen-soft, non tender, non distended, normal bowel sounds all 4 quadrants, no hepatosplenomegaly noted  Extremities-No clubbing cyanosis or edema  Neuro-Non focal, converses appropriately, awake, alert, oriented    Lab Results - Last 18 Months   Lab Units 04/20/20  1450 03/11/20  0828   GLUCOSE mg/dL 109 81   BUN mg/dL 21 16   CREATININE mg/dL 0.7 0.6   SODIUM mmol/L 140 143   POTASSIUM mmol/L 4.0 3.7   CHLORIDE mmol/L 99 101   TOTAL CO2 mmol/L 30* 30*       Lab Results - Last 18 Months   Lab Units 03/16/20  0543 03/15/20  1752 03/11/20  0828   HEMOGLOBIN g/dL  --  12.3 12.7   HEMATOCRIT %  --  36.4* 40.5   MCV fL  --  93.8 101.0*   WBC K/uL  --  13.8* 8.1   RDW %  --  11.7 12.3   MPV fL  --  " 9.3* 9.6   PLATELETS K/uL  --  303 278   INR  1.0 0.83*  --        Imaging Results (Most Recent)     None        Assessment/Plan   Diagnoses and all orders for this visit:    1. Hx of colonic polyps (Primary)  -     Case Request; Standing  -     Case Request    2. Family history of polyps in the colon  Comments:  father   Orders:  -     Case Request; Standing  -     Case Request    3. Other dysphagia  -     Case Request; Standing  -     Case Request    4. History of Nissen fundoplication  -     Case Request; Standing  -     Case Request    5. Nausea    Other orders  -     Follow Anesthesia Guidelines / Protocol; Future  -     Obtain Informed Consent; Future  -     Implement Anesthesia Orders Day of Procedure; Standing  -     Obtain Informed Consent; Standing  -     Verify bowel prep was successful; Standing  -     sodium-potassium-magnesium sulfates (Suprep Bowel Prep Kit) 17.5-3.13-1.6 GM/177ML solution oral solution; Take 1 bottle by mouth Every 12 (Twelve) Hours. Split dose prep as directed by office instructions provided.  2 bottles = one kit.  Dispense: 2 bottle; Refill: 0    Schedule both EGD and colonoscopy.  Patient will undergo Covid screening prior to procedure.  Both verbal and written education given.    ESOPHAGOGASTRODUODENOSCOPY WITH ANESTHESIA (N/A), COLONOSCOPY WITH ANESTHESIA (N/A)       Body mass index is 30.08 kg/m². Patient's Body mass index is 30.08 kg/m². BMI is within normal parameters. No follow-up required..    The risks, benefits, and alternatives of endoscopy were reviewed with the patient today.  Risks including perforation, with or without dilation, possibly requiring surgery.  Additional risks include risk of bleeding.  There is also the risk of a drug reaction or problems with anesthesia.  This will be discussed with the further by the anesthesia team on the day of the procedure. The benefits include the diagnosis and management of disease of the upper digestive tract.  Alternatives to  endoscopy include upper GI series, laboratory testing, radiographic evaluation, or no intervention.  The patient verbalizes understanding and agrees.    All risks, benefits, alternatives, and indications of colonoscopy procedure have been discussed with the patient. Risks to include perforation of the colon requiring possible surgery or colostomy, risk of bleeding from biopsies or removal of colon tissue, possibility of missing a colon polyp or cancer, or adverse drug reaction.  Benefits to include the diagnosis and management of disease of the colon and rectum. Alternatives to include barium enema, radiographic evaluation, lab testing or no intervention. Pt verbalizes understanding and agrees.

## 2020-11-19 ENCOUNTER — OFFICE VISIT (OUTPATIENT)
Dept: OTOLARYNGOLOGY | Age: 70
End: 2020-11-19
Payer: MEDICARE

## 2020-11-19 VITALS
SYSTOLIC BLOOD PRESSURE: 124 MMHG | WEIGHT: 150 LBS | DIASTOLIC BLOOD PRESSURE: 82 MMHG | HEIGHT: 60 IN | BODY MASS INDEX: 29.45 KG/M2

## 2020-11-19 PROBLEM — Z98.890 S/P FESS (FUNCTIONAL ENDOSCOPIC SINUS SURGERY): Status: ACTIVE | Noted: 2020-11-19

## 2020-11-19 PROCEDURE — 31231 NASAL ENDOSCOPY DX: CPT | Performed by: OTOLARYNGOLOGY

## 2020-11-19 PROCEDURE — 1036F TOBACCO NON-USER: CPT | Performed by: OTOLARYNGOLOGY

## 2020-11-19 PROCEDURE — G8427 DOCREV CUR MEDS BY ELIG CLIN: HCPCS | Performed by: OTOLARYNGOLOGY

## 2020-11-19 PROCEDURE — 4040F PNEUMOC VAC/ADMIN/RCVD: CPT | Performed by: OTOLARYNGOLOGY

## 2020-11-19 PROCEDURE — G8417 CALC BMI ABV UP PARAM F/U: HCPCS | Performed by: OTOLARYNGOLOGY

## 2020-11-19 PROCEDURE — 99212 OFFICE O/P EST SF 10 MIN: CPT | Performed by: OTOLARYNGOLOGY

## 2020-11-19 PROCEDURE — 3017F COLORECTAL CA SCREEN DOC REV: CPT | Performed by: OTOLARYNGOLOGY

## 2020-11-19 PROCEDURE — 1123F ACP DISCUSS/DSCN MKR DOCD: CPT | Performed by: OTOLARYNGOLOGY

## 2020-11-19 PROCEDURE — G8484 FLU IMMUNIZE NO ADMIN: HCPCS | Performed by: OTOLARYNGOLOGY

## 2020-11-19 PROCEDURE — 1090F PRES/ABSN URINE INCON ASSESS: CPT | Performed by: OTOLARYNGOLOGY

## 2020-11-19 PROCEDURE — G8399 PT W/DXA RESULTS DOCUMENT: HCPCS | Performed by: OTOLARYNGOLOGY

## 2020-11-19 RX ORDER — HYDROCODONE BITARTRATE AND ACETAMINOPHEN 10; 325 MG/1; MG/1
1 TABLET ORAL EVERY 6 HOURS PRN
COMMUNITY

## 2020-11-19 NOTE — PROGRESS NOTES
78 y.o.  female presents today for routine follow-up. It has been 6 months since I have seen her in regards to her prior sinus surgery. Overall she is doing well. She is aware of and appreciates a much improved nasal airway. She is experiencing some posterior nasal drip. She has had no sinus infections or purulent discharge since her surgical procedure.   She is currently taking Singulair on a daily basis along with her nasal irrigations    Family History   Problem Relation Age of Onset    Kidney Disease Mother     High Blood Pressure Mother     Other Mother         AAA    Heart Disease Father     Kidney Disease Father     Brain Cancer Brother     Stroke Paternal Grandfather      Social History     Socioeconomic History    Marital status: Single     Spouse name: None    Number of children: None    Years of education: None    Highest education level: None   Occupational History    None   Social Needs    Financial resource strain: None    Food insecurity     Worry: None     Inability: None    Transportation needs     Medical: None     Non-medical: None   Tobacco Use    Smoking status: Passive Smoke Exposure - Never Smoker    Smokeless tobacco: Never Used   Substance and Sexual Activity    Alcohol use: No    Drug use: No    Sexual activity: Never     Partners: Male   Lifestyle    Physical activity     Days per week: None     Minutes per session: None    Stress: None   Relationships    Social connections     Talks on phone: None     Gets together: None     Attends Nondenominational service: None     Active member of club or organization: None     Attends meetings of clubs or organizations: None     Relationship status: None    Intimate partner violence     Fear of current or ex partner: None     Emotionally abused: None     Physically abused: None     Forced sexual activity: None   Other Topics Concern    None   Social History Narrative    None     Past Medical History:   Diagnosis Date    Anemia  Asthma     CAD (coronary artery disease)     Chest tightness or pressure 2/2/2014 2/2/2014  lexiscan Positive for inferior apical myocardial ischemia, EF 62%, 2% ischemic myocardium on stress, low risk findings, AUC indication 15, AUC score 4     Chronic back pain     Chronic kidney disease, stage III (moderate)     GFR 40 mL/minute 7/10/15. no problems now    History of blood transfusion     Hyperlipidemia 1/6/2015    Hypertension     Osteoarthritis     Osteoporosis 8/2015    PONV (postoperative nausea and vomiting)     Torn tendon     LT HIP     Past Surgical History:   Procedure Laterality Date    ANKLE SURGERY Right     Right ankle fusion.  APPENDECTOMY      BACK SURGERY      BREAST SURGERY Bilateral     Bilateral breast reduction.  CARDIAC CATHETERIZATION  2/2/14  JDT    EF 50%    CARDIAC CATHETERIZATION  01/2016    Normal EF, mild nonocclusive CAD    CERVICAL FUSION      The [address] [place] [address], Dr. Debo Cole.  CHOLECYSTECTOMY      COLONOSCOPY      DILATATION, ESOPHAGUS      ENDOSCOPY, COLON, DIAGNOSTIC      FRACTURE SURGERY      GASTRIC FUNDOPLICATION  6409    HERNIA REPAIR      SABINE    HYSTERECTOMY      total    JOINT REPLACEMENT      bilateral knees and hip    NASAL SINUS SURGERY N/A 3/11/2020    NASAL ENDOSCOPY, REPAIR OF NASAL SEPTUM, PARTIAL ETHMOIDECTOMY, CAUTERY TURBINATE MUCOSA, INTRAMURAL, THERAPEUTIC FRACTURE INFERIOR TURBINATES, OPEN MAXILLARY SINUS performed by Re Mary MD at [address] [address] TOTAL HIP ARTHROPLASTY  11/2014    TOTAL KNEE ARTHROPLASTY Bilateral     TOTAL KNEE ARTHROPLASTY Right 2002    Total knee replacement, right knee revision, Dr. Deidre Mitchell 2002.           REVIEW OF SYSTEMS:  all other systems reviewed and are negative  General Health: no change in health status since last visit  Nose: sinus pain: No, sinus pressure: No, post nasal drip: Yes, congestion: No and obstruction: No       Comments: PHYSICAL EXAM:    /82   Ht 5' (1.524 m)   Wt 150 lb (68 kg)   BMI 29.29 kg/m²   Body mass index is 29.29 kg/m². General Appearance: well developed , well nourished and no distress  Head/ Face: normocephalic and atraumatic  Vocal Quality: good/ normal  Nose: septum midline, mucosa normal, discharge: Yes and mucoid and see endoscopy report  Neuro: alert and oriented x3 and cranial nerves II- XII grossly intact  Psych/ Mood: cooperative and no depression, anxiety or agitation    Assessment & Plan:    Problem List Items Addressed This Visit        ENT Problems    Chronic pansinusitis     Overall good surgical result. Both surgically enlarged ethmoid cavities are widely patent with no evidence of chronic inflammation granulation synechiae a polypoid growth etc.  Both maxillary ostia are widely patent. Given how good her nasal exam was today with no evidence of any chronic inflammatory processes I do not think any further follow-up is needed. I recommended she continue with the nasal irrigations nightly. I recommended that she use Flonase on a daily basis during the change in seasons. For symptomatic relief of posterior nasal drip she can utilize antihistamines. She is currently taking Singulair on a daily basis. Relevant Orders    [facility] Nasal endoscopy, diagnostic (Completed)       Other    S/P FESS (functional endoscopic sinus surgery)          Orders Placed This Encounter   Procedures    NH Nasal endoscopy, diagnostic     After application of topical anesthetic/decongestant, both nasal passageways were evaluated with a fiberoptic scope. The surgically enlarged ethmoid cavities were inspected. There was no inflammation granulation tissue scarring etc.  Is a bit more difficult to enter the right middle meatus region than the left as that middle turbinate is a bit more lateralized but both surgically enlarged cavities were well visualized. Both maxillary ostia were widely patent.   There was

## 2020-11-19 NOTE — ASSESSMENT & PLAN NOTE
Overall good surgical result. Both surgically enlarged ethmoid cavities are widely patent with no evidence of chronic inflammation granulation synechiae a polypoid growth etc.  Both maxillary ostia are widely patent. Given how good her nasal exam was today with no evidence of any chronic inflammatory processes I do not think any further follow-up is needed. I recommended she continue with the nasal irrigations nightly. I recommended that she use Flonase on a daily basis during the change in seasons. For symptomatic relief of posterior nasal drip she can utilize antihistamines. She is currently taking Singulair on a daily basis.

## 2020-12-09 ENCOUNTER — TRANSCRIBE ORDERS (OUTPATIENT)
Dept: LAB | Facility: HOSPITAL | Age: 70
End: 2020-12-09

## 2020-12-09 DIAGNOSIS — Z01.818 PRE-OP TESTING: Primary | ICD-10-CM

## 2020-12-14 ENCOUNTER — OFFICE VISIT (OUTPATIENT)
Dept: CARDIOLOGY | Age: 70
End: 2020-12-14
Payer: MEDICARE

## 2020-12-14 ENCOUNTER — LAB (OUTPATIENT)
Dept: LAB | Facility: HOSPITAL | Age: 70
End: 2020-12-14

## 2020-12-14 VITALS
SYSTOLIC BLOOD PRESSURE: 158 MMHG | HEIGHT: 60 IN | WEIGHT: 156 LBS | BODY MASS INDEX: 30.63 KG/M2 | HEART RATE: 53 BPM | DIASTOLIC BLOOD PRESSURE: 92 MMHG | OXYGEN SATURATION: 97 %

## 2020-12-14 LAB — SARS-COV-2 RNA PNL SPEC NAA+PROBE: NOT DETECTED

## 2020-12-14 PROCEDURE — C9803 HOPD COVID-19 SPEC COLLECT: HCPCS | Performed by: INTERNAL MEDICINE

## 2020-12-14 PROCEDURE — G8399 PT W/DXA RESULTS DOCUMENT: HCPCS | Performed by: CLINICAL NURSE SPECIALIST

## 2020-12-14 PROCEDURE — 3017F COLORECTAL CA SCREEN DOC REV: CPT | Performed by: CLINICAL NURSE SPECIALIST

## 2020-12-14 PROCEDURE — 4040F PNEUMOC VAC/ADMIN/RCVD: CPT | Performed by: CLINICAL NURSE SPECIALIST

## 2020-12-14 PROCEDURE — 1123F ACP DISCUSS/DSCN MKR DOCD: CPT | Performed by: CLINICAL NURSE SPECIALIST

## 2020-12-14 PROCEDURE — G8417 CALC BMI ABV UP PARAM F/U: HCPCS | Performed by: CLINICAL NURSE SPECIALIST

## 2020-12-14 PROCEDURE — G8427 DOCREV CUR MEDS BY ELIG CLIN: HCPCS | Performed by: CLINICAL NURSE SPECIALIST

## 2020-12-14 PROCEDURE — G8484 FLU IMMUNIZE NO ADMIN: HCPCS | Performed by: CLINICAL NURSE SPECIALIST

## 2020-12-14 PROCEDURE — 99214 OFFICE O/P EST MOD 30 MIN: CPT | Performed by: CLINICAL NURSE SPECIALIST

## 2020-12-14 PROCEDURE — 87635 SARS-COV-2 COVID-19 AMP PRB: CPT | Performed by: INTERNAL MEDICINE

## 2020-12-14 PROCEDURE — 1090F PRES/ABSN URINE INCON ASSESS: CPT | Performed by: CLINICAL NURSE SPECIALIST

## 2020-12-14 PROCEDURE — 1036F TOBACCO NON-USER: CPT | Performed by: CLINICAL NURSE SPECIALIST

## 2020-12-14 RX ORDER — FLUTICASONE PROPIONATE 50 MCG
1 SPRAY, SUSPENSION (ML) NASAL DAILY
COMMUNITY
End: 2022-01-24

## 2020-12-14 RX ORDER — AMLODIPINE BESYLATE 5 MG/1
5 TABLET ORAL DAILY
Qty: 90 TABLET | Refills: 1 | Status: SHIPPED | OUTPATIENT
Start: 2020-12-14 | End: 2021-04-23 | Stop reason: SDUPTHER

## 2020-12-14 ASSESSMENT — ENCOUNTER SYMPTOMS
SHORTNESS OF BREATH: 1
CHEST TIGHTNESS: 0
VOMITING: 0
ABDOMINAL PAIN: 0
COUGH: 0
FACIAL SWELLING: 0
WHEEZING: 0
EYE REDNESS: 0
NAUSEA: 0

## 2020-12-14 NOTE — PATIENT INSTRUCTIONS
Return in about 1 month (around 1/14/2021) for APRN. After seeing neurosurgeon- consider starting Aspirin 81mg daily  LDL cholesterol ( bad cholesterol) goal is less than 70 with a history of heart disease.   Amlodipine 5mg daily- take at bedtime  Bring labs to next visit and will discuss cholesterol  May use Clonidine 0.1mg as needed up to 4 x daily for BP > 160/90

## 2020-12-14 NOTE — PROGRESS NOTES
[facility], [address] [facility] [address] [facility] [address]ice Perez, [address] [address]  Phone: [phone]  Fax: [phone]    OFFICE VISIT:  12/14/2020    [address]: 1950    Reason For Visit:  Litzy Britton is a 78 y.o. female who is here for Follow-up (Patient c/o occasional high BP ) and Coronary Artery Disease      HPI  Patient is here for follow-up for  Mild to moderate, nonocclusive CAD  with the  following cardiac history:  2/2/2014  lexiscan Positive for inferior apical myocardial ischemia, EF 62%, 2% ischemic myocardium on stress, low risk findings, AUC indication 15, AUC score 4  2/2/2014  Cath  Mild to moderate distal disease, normal LVFX  1/21/2016  lexiscan  Positive for inferor lateral MI + myocardial ischemia, EF 69%, 12/11% ischemic myocardium on stress, low risk findings, AUC indication 15, AUC score 4  1/22/16  Cath  Mild to moderate distal disease, normal LVFX     She denies any chest pain, unusual dyspnea, orthopnea, PND, edema. She has some rare, brief palpitations. She struggles with an injury to her left leg (hip issues) which keeps her from being as active as she would like to be. She has had problems with statin intolerance in the past while on Lipitor and Crestor. We switched her to Zetia and she still felt she had some muscle aches and pains. She states she has been struggling with blood pressure control over the last 3 weeks. Her PCP has given her clonidine to use on an as-needed basis. She still continues to run high. She states she has been more pain recently related to her chronic hip issues which may be affecting her blood pressure. Urbano Ramos DO is PCP.   Aiden Stewart has the following history as recorded in [facility]:    Patient Active Problem List    Diagnosis Date Noted    S/P FESS (functional endoscopic sinus surgery) 11/19/2020    Deviated nasal septum 02/06/2020    Nasal turbinate hypertrophy 02/06/2020    Chronic pansinusitis 02/06/2020    Nasal obstruction 02/06/2020    Bradycardia 12/10/2019    Statin intolerance 12/10/2018    Abnormal nuclear cardiac imaging test     Positive cardiac stress test     Essential hypertension     Hypercholesteremia     Hyperlipidemia 01/06/2015    CAD (coronary artery disease)     Chest tightness or pressure 02/02/2014     Past Medical History:   Diagnosis Date    Anemia     Asthma     CAD (coronary artery disease)     Chest tightness or pressure 2/2/2014 2/2/2014  lexiscan Positive for inferior apical myocardial ischemia, EF 62%, 2% ischemic myocardium on stress, low risk findings, AUC indication 15, AUC score 4     Chronic back pain     Chronic kidney disease, stage III (moderate)     GFR 40 mL/minute 7/10/15. no problems now    History of blood transfusion     Hyperlipidemia 1/6/2015    Hypertension     Osteoarthritis     Osteoporosis 8/2015    PONV (postoperative nausea and vomiting)     Torn tendon     LT HIP     Past Surgical History:   Procedure Laterality Date    ANKLE SURGERY Right     Right ankle fusion.  APPENDECTOMY      BACK SURGERY      BREAST SURGERY Bilateral     Bilateral breast reduction.  CARDIAC CATHETERIZATION  2/2/14  JDT    EF 50%    CARDIAC CATHETERIZATION  01/2016    Normal EF, mild nonocclusive CAD    CERVICAL FUSION      The [address] [place] [address], Dr. Villa Hurtado.     CHOLECYSTECTOMY      COLONOSCOPY      DILATATION, ESOPHAGUS      ENDOSCOPY, COLON, DIAGNOSTIC      FRACTURE SURGERY      GASTRIC FUNDOPLICATION  3841    HERNIA REPAIR      SABINE    HYSTERECTOMY      total    JOINT REPLACEMENT      bilateral knees and hip    NASAL SINUS SURGERY N/A 3/11/2020    NASAL ENDOSCOPY, REPAIR OF NASAL SEPTUM, PARTIAL ETHMOIDECTOMY, CAUTERY TURBINATE MUCOSA, INTRAMURAL, THERAPEUTIC FRACTURE INFERIOR TURBINATES, OPEN MAXILLARY SINUS performed by Lennox Savers, MD at [address] [facility] E ARTHROPLASTY  11/2014  TOTAL KNEE ARTHROPLASTY Bilateral     TOTAL KNEE ARTHROPLASTY Right 2002    Total knee replacement, right knee revision, Dr. Robina Ríos 2002. Family History   Problem Relation Age of Onset    Kidney Disease Mother     High Blood Pressure Mother     Other Mother         AAA    Heart Disease Father     Kidney Disease Father     Brain Cancer Brother     Stroke Paternal Grandfather      Social History     Tobacco Use    Smoking status: Passive Smoke Exposure - Never Smoker    Smokeless tobacco: Never Used   Substance Use Topics    Alcohol use: No      Current Outpatient Medications   Medication Sig Dispense Refill    fluticasone (FLONASE ALLERGY RELIEF) 50 MCG/ACT nasal spray 1 spray by Each Nostril route daily      amLODIPine (NORVASC) 5 MG tablet Take 1 tablet by mouth daily 90 tablet 1    HYDROcodone-acetaminophen (NORCO)  MG per tablet Take 1 tablet by mouth every 6 hours as needed for Pain.  Cyanocobalamin (B-12 COMPLIANCE INJECTION IJ) Inject as directed      ondansetron (ZOFRAN ODT) 8 MG TBDP disintegrating tablet Place 1 tablet under the tongue every 8 hours as needed for Nausea or Vomiting 6 tablet 1    cloNIDine (CATAPRES) 0.1 MG tablet Take one tablet when BP is over 170/90. May take up to 3 tablets daily as needed. 90 tablet 3    hydrochlorothiazide (HYDRODIURIL) 25 MG tablet Take 25 mg by mouth daily      irbesartan (AVAPRO) 300 MG tablet Take 300 mg by mouth daily      oxybutynin (DITROPAN) 5 MG tablet Take 5 mg by mouth daily      montelukast (SINGULAIR) 10 MG tablet Take 10 mg by mouth daily      metoprolol tartrate (LOPRESSOR) 25 MG tablet Take 0.5 tablets by mouth 2 times daily 90 tablet 3    ALPRAZolam (XANAX) 0.25 MG tablet Take 0.25 mg by mouth nightly as needed.  PROVENTIL  (90 BASE) MCG/ACT inhaler Inhale 2 puffs into the lungs every 6 hours as needed        No current facility-administered medications for this visit.       Allergies: Meloxicam, Diovan [valsartan], and Demeclocycline hcl    Review of Systems  Review of Systems   Constitutional: Negative for activity change, diaphoresis, fatigue, fever and unexpected weight change. HENT: Negative for facial swelling and nosebleeds. Eyes: Negative for redness and visual disturbance. Respiratory: Positive for shortness of breath (mild,chronic). Negative for cough, chest tightness and wheezing. Cardiovascular: Negative for chest pain, palpitations and leg swelling. Gastrointestinal: Negative for abdominal pain, nausea and vomiting. Endocrine: Negative for cold intolerance and heat intolerance. Genitourinary: Negative for dysuria and hematuria. Musculoskeletal: Negative for arthralgias and myalgias. Unsteady gait, joint pain, chronic left hip pain   Skin: Negative for pallor and rash. Neurological: Negative for dizziness, seizures, syncope, weakness and light-headedness. Hematological: Does not bruise/bleed easily. Psychiatric/Behavioral: Negative for agitation. The patient is not nervous/anxious. Objective  Vital Signs - BP (!) 158/92   Pulse 53   Ht 5' (1.524 m)   Wt 156 lb (70.8 kg)   SpO2 97%   BMI 30.47 kg/m²   Physical Exam  Vitals signs and nursing note reviewed. Constitutional:       General: She is not in acute distress. Appearance: Normal appearance. She is well-developed. She is not ill-appearing or diaphoretic. HENT:      Head: Normocephalic and atraumatic. Eyes:      General:         Right eye: No discharge. Left eye: No discharge. Pupils: Pupils are equal, round, and reactive to light. Neck:      Musculoskeletal: Neck supple. No muscular tenderness. Vascular: No carotid bruit or JVD. Trachea: No tracheal deviation. Cardiovascular:      Rate and Rhythm: Normal rate and regular rhythm. Heart sounds: Normal heart sounds. No murmur. No friction rub. No gallop.        Comments: No carotid bruit  Pulmonary:      Effort: Pulmonary effort is normal. No respiratory distress. Breath sounds: Normal breath sounds. No wheezing or rales. Abdominal:      Palpations: Abdomen is soft. Tenderness: There is no abdominal tenderness. Musculoskeletal:         General: No swelling. Right lower leg: Edema (2+) present. Left lower leg: Edema (1+) present. Comments: Ambulates with cane, unsteady gait   Skin:     General: Skin is warm and dry. Findings: No rash. Neurological:      Mental Status: She is alert and oriented to person, place, and time. Cranial Nerves: No cranial nerve deficit. Psychiatric:         Behavior: Behavior normal.         Judgment: Judgment normal.         Data:    Heart Cath 1/222/16  Summary:       1. Successful femoral artery ultrasound  3. Mild to moderate coronary artery disease  4. Left ventricular function is normal    Assessment:     Diagnosis Orders   1. Coronary artery disease involving native coronary artery of native heart without angina pectoris     2. Essential hypertension     3. Mixed hyperlipidemia     4. Statin intolerance     5. Bradycardia         CADmild to moderate per heart cath 2016. Most recent stress test in [place] in 2019- negative. Patient had some brain bleeding after a sinus surgery earlier this year. She gets back to neurosurgeon in [place] very soon. Discuss permission to restart aspirin 81 mg daily    Hypertensionpoor control. Add amlodipine 5 mg daily at bedtime. Clonidine 0.1 mg every 6 hours as needed for BP greater than 160/90. Close follow-up again in 1 month to see how she is doing    Hypercholesterolemia/statin intolerancehas tried  Crestor,  Lipitor, and Zetia with myalgias. She will be having her labs done at PCP office next week and will bring into her follow-up appointment. She would like to consider pravastatin with coq.10 to see how she does.   We will wait to start until we stabilize blood pressure meds Bradycardiastable on current regimen    Stable cardiovascular status. No evidence of overt heart failure,angina or dysrhythmia. Plan    Return in about 1 month (around 1/14/2021) for MICHELLE. After seeing neurosurgeon- consider starting Aspirin 81mg daily  LDL cholesterol ( bad cholesterol) goal is less than 70 with a history of heart disease. Amlodipine 5mg daily- take at bedtime  Bring labs to next visit and will discuss cholesterol. Consider Pravastatin with CoQ10  May use Clonidine 0.1mg as needed up to 4 x daily for BP > 160/90    Call with any questionsor concerns  Follow up with Dianna Boswell, DO for non cardiac problems  Report any new problems  Cardiovascular Fitness-Exercise as tolerated. Strive for 15 minutes of exercise most days of the week. Cardiac / HealthyDiet  Continue current medications as directed  Continue plan of treatment  It is always recommended that you bring your medicationsbottles with you to each visit - this is for your safety!        MICHELLE Walter

## 2020-12-17 ENCOUNTER — ANESTHESIA EVENT (OUTPATIENT)
Dept: GASTROENTEROLOGY | Facility: HOSPITAL | Age: 70
End: 2020-12-17

## 2020-12-17 ENCOUNTER — HOSPITAL ENCOUNTER (OUTPATIENT)
Facility: HOSPITAL | Age: 70
Setting detail: HOSPITAL OUTPATIENT SURGERY
Discharge: HOME | End: 2020-12-17
Attending: INTERNAL MEDICINE | Admitting: INTERNAL MEDICINE

## 2020-12-17 ENCOUNTER — ANESTHESIA (OUTPATIENT)
Dept: GASTROENTEROLOGY | Facility: HOSPITAL | Age: 70
End: 2020-12-17

## 2020-12-17 ENCOUNTER — TELEPHONE (OUTPATIENT)
Dept: GASTROENTEROLOGY | Facility: CLINIC | Age: 70
End: 2020-12-17

## 2020-12-17 VITALS
WEIGHT: 150 LBS | HEART RATE: 57 BPM | SYSTOLIC BLOOD PRESSURE: 107 MMHG | RESPIRATION RATE: 15 BRPM | DIASTOLIC BLOOD PRESSURE: 53 MMHG | BODY MASS INDEX: 29.45 KG/M2 | TEMPERATURE: 97.2 F | HEIGHT: 60 IN | OXYGEN SATURATION: 97 %

## 2020-12-17 DIAGNOSIS — R13.19 OTHER DYSPHAGIA: ICD-10-CM

## 2020-12-17 DIAGNOSIS — Z98.890 HISTORY OF NISSEN FUNDOPLICATION: ICD-10-CM

## 2020-12-17 DIAGNOSIS — Z86.010 HX OF COLONIC POLYPS: ICD-10-CM

## 2020-12-17 DIAGNOSIS — Z83.71 FAMILY HISTORY OF POLYPS IN THE COLON: ICD-10-CM

## 2020-12-17 PROCEDURE — 25010000002 PROPOFOL 10 MG/ML EMULSION: Performed by: NURSE ANESTHETIST, CERTIFIED REGISTERED

## 2020-12-17 PROCEDURE — 88305 TISSUE EXAM BY PATHOLOGIST: CPT | Performed by: INTERNAL MEDICINE

## 2020-12-17 PROCEDURE — G0105 COLORECTAL SCRN; HI RISK IND: HCPCS | Performed by: INTERNAL MEDICINE

## 2020-12-17 PROCEDURE — C1726 CATH, BAL DIL, NON-VASCULAR: HCPCS | Performed by: INTERNAL MEDICINE

## 2020-12-17 PROCEDURE — 43249 ESOPH EGD DILATION <30 MM: CPT | Performed by: INTERNAL MEDICINE

## 2020-12-17 PROCEDURE — 43239 EGD BIOPSY SINGLE/MULTIPLE: CPT | Performed by: INTERNAL MEDICINE

## 2020-12-17 RX ORDER — SODIUM CHLORIDE 9 MG/ML
500 INJECTION, SOLUTION INTRAVENOUS CONTINUOUS PRN
Status: DISCONTINUED | OUTPATIENT
Start: 2020-12-17 | End: 2020-12-17 | Stop reason: HOSPADM

## 2020-12-17 RX ORDER — SODIUM CHLORIDE 0.9 % (FLUSH) 0.9 %
10 SYRINGE (ML) INJECTION AS NEEDED
Status: DISCONTINUED | OUTPATIENT
Start: 2020-12-17 | End: 2020-12-17 | Stop reason: HOSPADM

## 2020-12-17 RX ORDER — PROPOFOL 10 MG/ML
VIAL (ML) INTRAVENOUS AS NEEDED
Status: DISCONTINUED | OUTPATIENT
Start: 2020-12-17 | End: 2020-12-17 | Stop reason: SURG

## 2020-12-17 RX ADMIN — SODIUM CHLORIDE 500 ML: 9 INJECTION, SOLUTION INTRAVENOUS at 09:34

## 2020-12-17 RX ADMIN — PROPOFOL 30 MG: 10 INJECTION, EMULSION INTRAVENOUS at 09:57

## 2020-12-17 RX ADMIN — PROPOFOL 50 MG: 10 INJECTION, EMULSION INTRAVENOUS at 10:02

## 2020-12-17 RX ADMIN — PROPOFOL 50 MG: 10 INJECTION, EMULSION INTRAVENOUS at 09:55

## 2020-12-17 RX ADMIN — PROPOFOL 50 MG: 10 INJECTION, EMULSION INTRAVENOUS at 09:56

## 2020-12-17 RX ADMIN — PROPOFOL 50 MG: 10 INJECTION, EMULSION INTRAVENOUS at 10:07

## 2020-12-17 RX ADMIN — PROPOFOL 50 MG: 10 INJECTION, EMULSION INTRAVENOUS at 10:11

## 2020-12-17 RX ADMIN — PROPOFOL 30 MG: 10 INJECTION, EMULSION INTRAVENOUS at 09:59

## 2020-12-17 RX ADMIN — PROPOFOL 30 MG: 10 INJECTION, EMULSION INTRAVENOUS at 09:58

## 2020-12-17 RX ADMIN — PROPOFOL 50 MG: 10 INJECTION, EMULSION INTRAVENOUS at 10:00

## 2020-12-17 RX ADMIN — LIDOCAINE HYDROCHLORIDE 100 MG: 20 INJECTION, SOLUTION INTRAVENOUS at 09:54

## 2020-12-17 RX ADMIN — PROPOFOL 50 MG: 10 INJECTION, EMULSION INTRAVENOUS at 10:15

## 2020-12-17 RX ADMIN — PROPOFOL 70 MG: 10 INJECTION, EMULSION INTRAVENOUS at 09:54

## 2020-12-17 RX ADMIN — PROPOFOL 50 MG: 10 INJECTION, EMULSION INTRAVENOUS at 10:05

## 2020-12-17 RX ADMIN — PROPOFOL 50 MG: 10 INJECTION, EMULSION INTRAVENOUS at 10:03

## 2020-12-17 RX ADMIN — PROPOFOL 50 MG: 10 INJECTION, EMULSION INTRAVENOUS at 10:09

## 2020-12-17 RX ADMIN — PROPOFOL 50 MG: 10 INJECTION, EMULSION INTRAVENOUS at 10:01

## 2020-12-17 NOTE — H&P
"    Chief Complaint:   Dysphagia and colon polyps    Subjective     HPI:   Eliza Corbin is a 70 y.o. female who presents as a referral for preventative maintenance. She hasNo change in bowels. No wt loss. No BRBPR. No melena. There is no family hx for colon cancer. No abdominal pain. There was no Cologuard screening this year.  The patient's last colonoscopy was performed on 7/23/2015 findings of polyp, but path was neg for polyp.  Father had colon polyps over the age of 60.     The patient states she has recently been having difficulty swallowing \"mostly pills and I have to make myself vomit.\" History of Nissen Fundoplication. Nausea worse recently within the past few months. The patient denies any  vomiting, epigastric pain, pyrosis or hematemesis.  The patient denies any fever or chills.  Denies any melena or hematochezia.  Denies any unintentional weight loss or loss of appetite.    Past Medical History:   Past Medical History:   Diagnosis Date   • Anxiety    • Chronic back pain    • GERD (gastroesophageal reflux disease)    • Hiatal hernia    • HTN (hypertension)    • Hypercholesteremia    • IBS (irritable bowel syndrome)    • Iron deficiency anemia    • PONV (postoperative nausea and vomiting)    • Stroke (CMS/HCC) 03/2020   • Vitamin D deficiency        Past Surgical History:  Past Surgical History:   Procedure Laterality Date   • ANKLE SURGERY     • APPENDECTOMY     • BILATERAL BREAST REDUCTION     • CARDIAC CATHETERIZATION     • CERVICAL FUSION     • CHOLECYSTECTOMY     • COLONOSCOPY  07/23/2015   • COLONOSCOPY  01/30/2008    no active colitis, mild non-specific chronic inflammation   • HIP BIPOLAR REPLACEMENT     • HYSTERECTOMY     • NISSEN FUNDOPLICATION     • REPLACEMENT TOTAL KNEE     • SINUS SURGERY  03/2020   • TONSILLECTOMY     • UPPER GASTROINTESTINAL ENDOSCOPY  07/23/2015   • UPPER GASTROINTESTINAL ENDOSCOPY  07/18/2016   • UPPER GASTROINTESTINAL ENDOSCOPY  05/13/2009    neg Mercedes's, gastric " "and GEJ junction bx's with mild to mod chronic inflammation, mod HH- Dr. Garland       Family History:  Family History   Problem Relation Age of Onset   • Colon polyps Father    • Colon cancer Neg Hx    • Esophageal cancer Neg Hx        Social History:   reports that she has never smoked. She has never used smokeless tobacco. She reports current alcohol use. She reports that she does not use drugs.    Medications:   Medications Prior to Admission   Medication Sig Dispense Refill Last Dose   • ALPRAZolam (XANAX) 0.25 MG tablet Take 0.25 mg by mouth 2 (Two) Times a Day As Needed for Anxiety.   Past Week at Unknown time   • DULoxetine (CYMBALTA) 30 MG capsule Take 30 mg by mouth 3 (Three) Times a Day.   12/16/2020 at Unknown time   • hydroCHLOROthiazide (HYDRODIURIL) 25 MG tablet Take 25 mg by mouth Daily.   12/16/2020 at Unknown time   • HYDROcodone-acetaminophen (NORCO)  MG per tablet Take 1 tablet by mouth Every 6 (Six) Hours As Needed for Moderate Pain .   12/16/2020 at Unknown time   • irbesartan (AVAPRO) 300 MG tablet Take 300 mg by mouth Every Night.   12/16/2020 at Unknown time   • metoprolol succinate XL (TOPROL-XL) 25 MG 24 hr tablet Take 25 mg by mouth Daily.   12/17/2020 at Unknown time   • montelukast (SINGULAIR) 10 MG tablet Take 10 mg by mouth Every Night.   12/16/2020 at Unknown time   • calcium carbonate (OS-ESCOBAR) 600 MG tablet Take 600 mg by mouth Daily.   Unknown at Unknown time   • cloNIDine (CATAPRES) 0.1 MG tablet Take 0.1 mg by mouth 2 (Two) Times a Day.   12/15/2020   • multivitamin with minerals (MULTIVITAMIN WOMEN 50+ PO) Take 1 tablet by mouth Daily.   Unknown at Unknown time       Allergies:  Diovan [valsartan], Meloxicam, and Declomycin [demeclocycline]    ROS:    General: Weight stable  Resp: No SOA  Cardiovascular: No CP      Objective     /60 (Patient Position: Sitting)   Pulse 50   Temp 97.2 °F (36.2 °C) (Temporal)   Resp 20   Ht 152.4 cm (60\")   Wt 68 kg (150 lb)   SpO2 " 93%   BMI 29.29 kg/m²     Physical Exam   Constitutional: Pt is oriented to person, place, and in no distress.  Eyes: No icterus  ENMT: Unremarkable   Cardiovascular: Normal rate, regular rhythm.    Pulmonary/Chest: No distress.  No audible wheezes  Abdominal: Soft.   Skin: Skin is warm and dry.   Psychiatric: Mood, memory, affect and judgment appear normal.     Assessment/Plan     Diagnosis:  Family history of colon polyps  Dysphagia    Anticipated Surgical Procedure:  Endoscopy and colonoscopy    The risks, benefits, and alternatives of endoscopy were reviewed with the patient today.  Risks including perforation, with or without dilation, possibly requiring surgery.  Additional risks include risk of bleeding.  There is also the risk of a drug reaction or problems with anesthesia.  This will be discussed with the further by the anesthesia team on the day of the procedure. The benefits include the diagnosis and management of disease of the upper digestive tract.  Alternatives to endoscopy include upper GI series, laboratory testing, radiographic evaluation, or no intervention.  The patient verbalizes understanding and agrees.    The risks, benefits, and alternatives of colonoscopy were reviewed with the patient today.  Risks including perforation of the colon possibly requiring surgery or colostomy.  Additional risks include risk of bleeding from biopsies or removal of colon tissue.  There is also the risk of a drug reaction or problems with anesthesia.  This will be discussed with the further by the anesthesia team on the day of the procedure.  Lastly there is a possibility of missing a colon polyp or cancer.  The benefits include the diagnosis and management of disease of the colon and rectum.  Alternatives to colonoscopy include barium enema, laboratory testing, radiographic evaluation, or no intervention.  The patient verbalizes understanding and agrees.    Much of this encounter note is an electronic  transcription/translation of spoken language to printed text. The electronic translation of spoken language may permit erroneous, or at times, nonsensical words or phrases to be inadvertently transcribed; although I have reviewed the note for such errors, some may still exist.

## 2020-12-17 NOTE — ANESTHESIA POSTPROCEDURE EVALUATION
Patient: Eliza Corbin    Procedure Summary     Date: 12/17/20 Room / Location: [facility] ENDOSCOPY 6 / [facility] ENDOSCOPY    Anesthesia Start: 0949 Anesthesia Stop: 1024    Procedures:       ESOPHAGOGASTRODUODENOSCOPY WITH ANESTHESIA (N/A )      COLONOSCOPY WITH ANESTHESIA (N/A ) Diagnosis:       Hx of colonic polyps      Family history of polyps in the colon      Other dysphagia      History of Nissen fundoplication      (Hx of colonic polyps [Z86.010])      (Family history of polyps in the colon [Z83.71])      (Other dysphagia [R13.19])      (History of Nissen fundoplication [Z98.890])    Surgeon: Lissette Rosales MD Provider: Grayson Mata CRNA    Anesthesia Type: MAC ASA Status: 3          Anesthesia Type: MAC    Vitals  Vitals Value Taken Time   /60 12/17/20 1035   Temp     Pulse 43 12/17/20 1039   Resp 15 12/17/20 1026   SpO2 97 % 12/17/20 1039   Vitals shown include unvalidated device data.        Post Anesthesia Care and Evaluation    Patient location during evaluation: PHASE II  Patient participation: complete - patient participated  Level of consciousness: awake  Pain score: 0  Pain management: adequate  Airway patency: patent  Anesthetic complications: No anesthetic complications  PONV Status: none  Cardiovascular status: acceptable  Respiratory status: acceptable  Hydration status: acceptable

## 2020-12-17 NOTE — ANESTHESIA PREPROCEDURE EVALUATION
Anesthesia Evaluation     Patient summary reviewed and Nursing notes reviewed   NPO Solid Status: > 8 hours  NPO Liquid Status: > 6 hours           Airway   Mallampati: II  TM distance: >3 FB  No difficulty expected  Dental - normal exam     Pulmonary - normal exam   Cardiovascular     PT is on anticoagulation therapy  Patient on routine beta blocker  Rhythm: regular  Rate: normal    (+) hypertension well controlled 2 medications or greater, CAD, hyperlipidemia,       Neuro/Psych  GI/Hepatic/Renal/Endo    (+)  hiatal hernia, GERD,      Musculoskeletal     (+) back pain,   Abdominal  - normal exam   Substance History      OB/GYN negative ob/gyn ROS         Other                      Anesthesia Plan    ASA 3     MAC     intravenous induction     Anesthetic plan, all risks, benefits, and alternatives have been provided, discussed and informed consent has been obtained with: patient.

## 2020-12-17 NOTE — TELEPHONE ENCOUNTER
Colon recall 10 years    Lissette Rosales MD       100 UNIT/ML injection vial  Commonly known as:  HUMALOG  Inject 25 Units into the skin 3 times daily (with meals)  What changed:    · how much to take  · when to take this     metFORMIN 1000 MG tablet  Commonly known as:  GLUCOPHAGE  Take 1 tablet by mouth 2 times daily (with meals)  What changed:    · medication strength  · how much to take        CONTINUE taking these medications    linagliptin 5 MG tablet  Commonly known as:  TRADJENTA  Take 1 tablet by mouth daily  Start taking on:  3/27/2019     oxyCODONE-acetaminophen 7.5-325 MG per tablet  Commonly known as:  PERCOCET        STOP taking these medications    diclofenac 1.3 % patch  Commonly known as:  FLECTOR     insulin glargine 100 UNIT/ML injection pen  Commonly known as:  Poli Jeronimo  Replaced by:  insulin glargine 100 UNIT/ML injection vial        ASK your doctor about these medications    aspirin 81 MG chewable tablet  Take 1 tablet by mouth daily     glucose monitoring kit monitoring kit  1 each by Does not apply route once for 1 dose.      Insulin Pen Needle 32G X 5 MM Misc  1 each by Does not apply route daily     Lancets Misc  1 each by Does not apply route daily     lidocaine 5 %  Commonly known as:  LIDODERM     lisinopril 2.5 MG tablet  Commonly known as:  PRINIVIL;ZESTRIL     naproxen 500 MG tablet  Commonly known as:  NAPROSYN     ondansetron 4 MG tablet  Commonly known as:  ZOFRAN  Take 1 tablet by mouth every 8 hours as needed for Nausea or Vomiting     tiZANidine 2 MG tablet  Commonly known as:  Narendra Sahu           Where to Get Your Medications      You can get these medications from any pharmacy    Bring a paper prescription for each of these medications  · hydrALAZINE 50 MG tablet  · insulin glargine 100 UNIT/ML injection vial  · insulin lispro 100 UNIT/ML injection vial  · linagliptin 5 MG tablet  · metFORMIN 1000 MG tablet  · sulfamethoxazole-trimethoprim 800-160 MG per tablet         Consults: Podiatry  Endocrinology    Significant Diagnostic Studies:   Blood work reviewed. CBC with Differential:    Lab Results   Component Value Date    WBC 5.6 2019    RBC 3.99 2019    HGB 12.3 2019    HCT 36.3 2019     2019    MCV 91.0 2019    MCH 30.8 2019    MCHC 33.9 2019    RDW 12.1 2019    SEGSPCT 75.0 2019    LYMPHOPCT 12.3 2019    MONOPCT 8.9 2019    EOSPCT 1.5 2010    BASOPCT 0.4 2019    MONOSABS 0.5 2019    LYMPHSABS 0.7 2019    EOSABS 0.2 2019    BASOSABS 0.0 2019    DIFFTYPE AUTOMATED DIFFERENTIAL 2019     CMP:    Lab Results   Component Value Date     2019    K 4.0 2019     2019    CO2 24 2019    BUN 13 2019    CREATININE 1.4 2019    GFRAA >60 2019    LABGLOM 55 2019    GLUCOSE 265 2019    PROT 6.5 2019    PROT 7.1 2012    LABALBU 3.1 2019    CALCIUM 7.6 2019    BILITOT 0.3 2019    ALKPHOS 55 2019    AST 16 2019    ALT 20 2019     Ovi Walters DPM   Podiatrist   Podiatry   Op Note   Signed   Date of Service:  3/23/2019  4:09 PM               Signed                  Show:Clear all  [x]Manual[]Template[]Copied    Added by:  [x]Jose Danielson DPM      []Angel for details                        [address], [address] [place] [address]                                 OPERATIVE REPORT     PATIENT NAME: Nury Umaña                     :        1973  MED REC NO:   [id number]                          ROOM:       [facility]  ACCOUNT NO:   [de-identified]                           ADMIT DATE: 2019  PROVIDER:     Neema Gould DPM     DATE OF PROCEDURE:  2019     SURGEON ON CASE:  Neema Gould DPM     PREOPERATIVE DIAGNOSES:  1. Infected right fifth toe with full thickness ulceration.   2.  Ulceration of plantar aspect of the right fifth metatarsophalangeal  joint. POSTOPERATIVE DIAGNOSES:  1. Infected right fifth toe with full thickness ulceration. 2.  Ulceration of plantar aspect of the right fifth metatarsophalangeal  joint. PROCEDURES PERFORMED:  1. Amputation of the right fifth toe. 2.  Non-excisional debridement of the plantar ulceration of right fifth  metatarsophalangeal joint. DESCRIPTION OF PROCEDURE:  The patient was brought to the operating  room, placed supine on the operating room table where general anesthesia  was obtained. The patient then had a tourniquet placed about the right  ankle area. The right foot was prepped and draped in the usual sterile  manner. An Esmarch was utilized to exsanguinate the limb prior to  inflation of the tourniquet to 250 mmHg. The Esmarch was removed and  the procedure began on his right foot. A 15-blade was utilized to make an incision just proximal to the  proximal interphalangeal joint on dorsal aspect of the foot. This was  made on the dorsal aspect and then a second fishmouth incision was made  from that incision around the tip of the toe medially and laterally  converging on the first incision. Through this, the distal and middle  phalanx were splayed taking the dorsal skin with the splayed toe. The  dorsal skin had a full thickness eschar down to the tendons and bones  that was dry without any recovery shown. This area was cultured. The  wound at this point was cultured. There was no pus down during the  amputation. At this point, the head of the proximal phalanx was  protruding from the wound and the soft tissues were then cleared from  the distal one-half of the proximal phalanx and the parasol which was  utilized to resect the distal one-half of the proximal phalanx.   The  distal one-half of the head of the proximal phalanx was then removed and  the remaining wound was then irrigated with copious amounts of sterile  saline and prepared for wound closure. Again, there was no pus found at  all and no necrotic tissue beyond the full thickness dorsal necrotic  tissue. The plantar flap of tissue was going to be used for closure. There was an excess of amounts of air for the distal end of this plantar  flap was trimmed for an appropriative closure. The subcutaneous tissue  was then reapproximated between the distal flap to the dorsal wound site  utilizing 3 sutures of 3-0 Vicryl. The skin was then repaired with 3-0  nylon. The foot was then injected with 10 mL of 0.25% Marcaine plain. The plantar first metatarsal ulceration was then debrided with a #15  blade. During this debridement, epidermis and dermis were removed and  the basement membrane layer of the dermis was still intact and there was  no subcutaneous tissue exposed. The wound areas were then dressed with  Adaptic fluffs, Kerlix, and Coban. The patient left the OR with vital  signs stable and will be evaluated tomorrow.            aTd Sigala DPM     D: 03/23/2019 14:16:33       T: 03/23/2019 16:09:04     [email or web address]  Job#: [id number]     Doc#: [id number]     CC:  <>                   Last signed by: Von Braroso DPM at 3/24/2019  8:49 AM   ·   ED to Hosp-Admission (Current) on 3/21/2019   ·     ·   Routing History   ·     ·   Detailed Report   ·     Op Note Info     Author Note Status Last Update User   Von Barroso DPM Signed Von Barroso DPM   Last Update Date/Time: 3/24/2019  8:49 AM   Chart Review Routing History     Recipient Method Report Sent By   Austin Briseno MD   Phone: [phone]    In [address] Routed Zaheer Bhat DPM [[id number]]   Sent: 3/24/2019  8:49 AM   Filed: 03/24/2019   Austin Briseno MD   Phone: [phone]    In Basket IP Auto Routed [employer] Incoming Transcription [[id number]]   Sent: 3/24/2019  2:18 AM   Filed: 03/24/2019      VL DUP LOWER EXTREMITY VENOUS BILATERAL [[id number]] Collected: 03/21/19 0410     Order Status: Completed Updated: 03/21/19 0420     Narrative:       EXAMINATION:  DUPLEX VENOUS ULTRASOUND OF THE BILATERAL LOWER EXTREMITIES, 3/21/2019 3:21 am    TECHNIQUE:  Duplex ultrasound and Doppler images were obtained of the bilateral lower  extremities    COMPARISON:  January 11, 2019. HISTORY:  ORDERING SYSTEM PROVIDED HISTORY: leg pain  TECHNOLOGIST PROVIDED HISTORY:  Reason for exam:->leg pain  Ordering Physician Provided Reason for Exam: Bilat leg pain and swelling  Acuity: Acute  Type of Exam: Subsequent/Follow-up  Additional signs and symptoms: Previous in January negative    FINDINGS:  The visualized veins of the bilateral lower extremities are patent and free  of echogenic thrombus. The veins are normally compressible and have normal  phasic flow. Impression:       No evidence of DVT in either lower extremity.      XR FOOT RIGHT (MIN 3 VIEWS) [[id number]] Collected: 03/21/19 0352     Order Status: Completed Updated: 03/21/19 0402     Narrative:       EXAMINATION:  3 XRAY VIEWS OF THE RIGHT FOOT; 3 XRAY VIEWS OF THE LEFT FOOT    3/21/2019 3:46 am; 3/21/2019 3:47 am    COMPARISON:  Left foot radiograph 01/07/2019, right foot radiograph 05/21/2015    HISTORY:  ORDERING SYSTEM PROVIDED HISTORY: toe pain  TECHNOLOGIST PROVIDED HISTORY:  Reason for exam:->toe pain  Ordering Physician Provided Reason for Exam: toe pain  Acuity: Acute  Type of Exam: Initial  Mechanism of Injury: toe pain  Relevant Medical/Surgical History: toe pain;    ORDERING SYSTEM PROVIDED HISTORY: ulcer to bottom of foot  TECHNOLOGIST PROVIDED HISTORY:  Reason for exam:->ulcer to bottom of foot  Ordering Physician Provided Reason for Exam: ulcer to bottom of foot  Acuity: Acute  Type of Exam: Initial  Mechanism of Injury: ulcer to bottom of foot  Relevant Medical/Surgical History: ulcer to bottom of foot    FINDINGS:  RIGHT FOOT:  New irregularities involving the proximal 2nd through 5th  metatarsals as well as the cuneiforms, Order Status: Completed Updated: 03/21/19 0402     Narrative:       EXAMINATION:  3 XRAY VIEWS OF THE RIGHT FOOT; 3 XRAY VIEWS OF THE LEFT FOOT    3/21/2019 3:46 am; 3/21/2019 3:47 am    COMPARISON:  Left foot radiograph 01/07/2019, right foot radiograph 05/21/2015    HISTORY:  ORDERING SYSTEM PROVIDED HISTORY: toe pain  TECHNOLOGIST PROVIDED HISTORY:  Reason for exam:->toe pain  Ordering Physician Provided Reason for Exam: toe pain  Acuity: Acute  Type of Exam: Initial  Mechanism of Injury: toe pain  Relevant Medical/Surgical History: toe pain;    ORDERING SYSTEM PROVIDED HISTORY: ulcer to bottom of foot  TECHNOLOGIST PROVIDED HISTORY:  Reason for exam:->ulcer to bottom of foot  Ordering Physician Provided Reason for Exam: ulcer to bottom of foot  Acuity: Acute  Type of Exam: Initial  Mechanism of Injury: ulcer to bottom of foot  Relevant Medical/Surgical History: ulcer to bottom of foot    FINDINGS:  RIGHT FOOT:  New irregularities involving the proximal 2nd through 5th  metatarsals as well as the cuneiforms, including associated joint space loss  and erosions. Associated chronic appearing fracture in the proximal shaft of  the 5th metatarsal.  Increased osteolysis of the 1st distal phalanx with only  the base remaining. Joints maintain anatomic alignment. Calcaneal  enthesophytes at the insertion of the Achilles tendon and origin of the  plantar aponeurosis. Soft tissue swelling predominantly in the forefoot. 0.2 cm radiopaque foreign body in the plantar forefoot at the level of the  metatarsophalangeal joints. No soft tissue gas nor evident skin defect. LEFT FOOT:  Amputation of the 4th toe at the 4th metacarpophalangeal joint. No acute fractures nor areas of obvious abnormal cortical disruption. Unchanged findings of periostitis associated with the 3rd and 4th  metatarsals. Remnant joints maintain anatomic alignment.   Calcaneal  enthesophytes at the insertion of the Achilles tendon and origin of the  plantar aponeurosis. Suspected ulceration in the plantar forefoot at the  level of the metatarsophalangeal joints. Soft tissue swelling predominantly  in the forefoot. No soft tissue gas nor radiopaque foreign body. Impression:       1. Ulceration in the plantar left forefoot with no definite findings of  underlying osteomyelitis or necrotizing fasciitis. Soft tissue swelling in  the left forefoot likely represent cellulitis. 2. Soft tissue swelling in the right forefoot also likely represents  cellulitis. No findings of underlying necrotizing fasciitis. 0.2 cm  radiopaque foreign body in the plantar right forefoot. 3. Extensive new destructive changes centered about the right 2nd through 5th  tarsometatarsal joints, potentially related to chronic osteomyelitis or  neuropathic arthropathy. Of note, there is a nonunited fracture of the  proximal right 5th metatarsal shaft. 4. Increased osteolysis of the right 1st distal phalanx. XR CHEST PORTABLE [[id number]] Collected: 03/21/19 0240     Order Status: Completed Updated: 03/21/19 0244     Narrative:       EXAMINATION:  SINGLE XRAY VIEW OF THE CHEST    3/21/2019 2:34 am    COMPARISON:  Chest radiograph 10/06/2018    HISTORY:  ORDERING SYSTEM PROVIDED HISTORY: dizziness  TECHNOLOGIST PROVIDED HISTORY:  Reason for exam:->dizziness  Ordering Physician Provided Reason for Exam: dizziness  Acuity: Acute  Type of Exam: Initial  Mechanism of Injury: dizziness  Relevant Medical/Surgical History: dizziness    FINDINGS:  Clear lungs. No findings of pneumothorax or pleural effusion. Normal  mediastinal, hilar, and cardiac contours. No acute fracture. Impression:       No acute cardiopulmonary process.          [address], [place]   Physician   Endocrinology   Consults   Signed   Date of Service:  3/23/2019  7:08 AM               Signed        Expand All Collapse All           Show:Clear all  [x]Manual[x]Template[]Copied    Added by:  [x]SANJANA Melton Nandini Staples MD      []Angel for details      Endocrinology   Consult Note  Dear Doctor  Bj Blood      Thank You for the Consult      Pt. Was Admitted for : Diabetic foot infection/ Possible Osteomyelitis      Reason for Consult:  Better control of Blood glucose      History Obtained From:  Patient/ EMR         HISTORY OF PRESENT ILLNESS:                 The patient is a 39 y.o. male with significant past medical history of DM, nephropathy, neuropathy, HPTN, Right and left foot ulcers admittted to hospital for possible osteomyelitis of tight foot . I was  consulted for better control of blood glucose. ROS:   Pt's ROS done in detail. Abnormal ROS are noted in Medical and Surgical History Section below: Other Medical History:    Past Medical History             Diagnosis Date    Abscess 2010     scrotal    Anxiety associated with depression      Back pain 7/2/2012    Chest pain 5/1/2013    Diabetic nephropathy ([address]      Diabetic neuropathy ([address] 12/22/2011    Diabetic ulcer of left midfoot associated with type 2 diabetes mellitus, with fat layer exposed ([address] 7/18/2017    Diverticulosis       C scope + Dr. Paradise Schwab DM (diabetes mellitus), type 2 ([address] 2002     DR. Turner podiatry    Hyperlipidemia LDL goal < 100 7/15/2013    Hypertension      Internal hemorrhoid       C scope + Dr. Doherty Pilsner fracture 1988    Panic attacks      Pericarditis 2003     Hospitalized with s/p heart cath normal    Peripheral autonomic neuropathy due to diabetes mellitus ([address]       Axonal EMG- NCS, March 2011    Sebaceous cyst 9/1/2011    URI (upper respiratory infection) 2/27/2012    WD-Wound, surgical, infected, initial encounter 12/8/2017    Wrist fracture 1986         Surgical History:    Past Surgical History             Procedure Laterality Date    ABDOMEN SURGERY        CARDIAC CATHETERIZATION   2003, 2013     Normal (Dr. Susi Maldonado)    COLONOSCOPY   6/2/2011     Pandiverticulosis, Nonbleeding internal hemorrhoids, Repeat colonoscopy at age 50- Dr. Marisa Lafleur     \"had reconstruction surgery on nose a year after the other nose surgery\"    OTHER SURGICAL HISTORY Right 05/22/2015     I & D right great toe with partial amputation    OTHER SURGICAL HISTORY   12/04/2017     inc and drainage of abcess    IA DEEP INCIS FOOT BONE INFECTN Left 9/22/2018     LEFT FOOT DEBRIDEMENT INCISION AND DRAINAGE TOP AND BOTTOM performed by Alma Alonzo DPM at [facility] [id number]     sebaceous cyst removal times 4     TOE AMPUTATION         right great toe    TONSILLECTOMY AND ADENOIDECTOMY   1988    UPPER GASTROINTESTINAL ENDOSCOPY   06/2/2011     Mild gastritis with moderatley severe antritis, small hiatal hernia, Dr. Carmenza Luong N/A 1/12/2019     EGD BIOPSY performed by Raiza Miller MD at [facility] ENDOSCOPY            Allergies:  Adhesive tape; Doxycycline; and Reglan [metoclopramide]     Family History:   Family History             Problem Relation Age of Onset   [facility] Cancer Mother           ?site   [facility] Stroke Mother      Bleeding Prob Mother      Diabetes Father      Heart Disease Father      High Blood Pressure Father      Obesity Father      Kidney Disease Father      Diabetes Sister      High Blood Pressure Sister      Mental Illness Sister      Obesity Sister      High Blood Pressure Other      Mental Illness Other           bipolar    Other Son           cyst in ear canal    ADHD Daughter           REVIEW OF SYSTEMS:  Review of System Done as noted above      PHYSICAL EXAM:       Vitals:    BP (!) 151/96   Pulse 78   Temp 98.2 °F (36.8 °C) (Oral)   Resp 17   Ht 5' 9\" (1.753 m)   Wt 268 lb 1.6 oz (121.6 kg)   SpO2 97%   BMI 39.59 kg/m²      CONSTITUTIONAL:  awake, alert, cooperative, appears stated age   EYES:  vision intact Fundoscopic Exam not performed   ENT:Normal  NECK:  Supple, No JVD.    Thyroid Exam:Normal   LUNGS:  Has Vesicular Breath Sounds,   CARDIOVASCULAR:  Normal apical impulse, regular rate and rhythm, normal S1 and S2, no S3 or S4, and has no  murmur   ABDOMEN:  No scars, normal bowel sounds, soft, non-distended, non-tender, no masses palpated, no hepatolienomegaly  Musculoskeletal: Normal  Extremities: Normal, peripheral pulses normal, , has no edema   Has right 5 th toe ulcers and left foot ulcer. NEUROLOGIC:  Awake, alert, oriented to name, place and time. Cranial nerves II-XII are grossly intact. Motor is  intact. Sensory neuropathy.  ,  and gait is abnormal.     DATA:     CBC:        Recent Labs     03/21/19  0300 03/22/19  0825   WBC 9.7 5.6   HGB 12.5* 12.3*    176    CMP:        Recent Labs     03/21/19  0300 03/22/19  0825 03/23/19  0609   * 136  --    K 4.1 4.0  --    CL 99 104  --    CO2 21 24  --    BUN 29* 13  --    CREATININE 1.2 0.9 1.1   CALCIUM 7.9* 7.6*  --    PROT 6.5  --   --    LABALBU 3.1*  --   --    BILITOT 0.3  --   --    ALKPHOS 55  --   --    AST 16  --   --    ALT 20  --   --       Lipids:         Lab Results   Component Value Date     CHOL 173 07/24/2017     CHOL 168 10/15/2013     HDL 35 07/24/2017     TRIG 231 07/24/2017      Glucose:         Recent Labs     03/22/19  1412 03/22/19  1717 03/22/19  2254   POCGLU 228* 334* 156*      Hemoglobin A1C:         Lab Results   Component Value Date     LABA1C 8.7 03/21/2019      Free T4:         Lab Results   Component Value Date     T4FREE 1.25 07/24/2017      Free T3: No results found for: FT3  TSH High Sensitivity:         Lab Results   Component Value Date     TSHHS 2.300 07/24/2017         Xr Foot Left (min 3 Views)     Result Date: 3/21/2019  EXAMINATION: 3 XRAY VIEWS OF THE RIGHT FOOT; 3 XRAY VIEWS OF THE LEFT FOOT 3/21/2019 3:46 am; 3/21/2019 3:47 am COMPARISON: Left foot radiograph 01/07/2019, right foot radiograph 05/21/2015 HISTORY: ORDERING SYSTEM PROVIDED HISTORY: toe pain TECHNOLOGIST PROVIDED HISTORY: Reason for exam:->toe pain Ordering Physician Provided Reason for Exam: toe pain Acuity: Acute Type of Exam: Initial Mechanism of Injury: toe pain Relevant Medical/Surgical History: toe pain; ORDERING SYSTEM PROVIDED HISTORY: ulcer to bottom of foot TECHNOLOGIST PROVIDED HISTORY: Reason for exam:->ulcer to bottom of foot Ordering Physician Provided Reason for Exam: ulcer to bottom of foot Acuity: Acute Type of Exam: Initial Mechanism of Injury: ulcer to bottom of foot Relevant Medical/Surgical History: ulcer to bottom of foot FINDINGS: RIGHT FOOT:  New irregularities involving the proximal 2nd through 5th metatarsals as well as the cuneiforms, including associated joint space loss and erosions. Associated chronic appearing fracture in the proximal shaft of the 5th metatarsal.  Increased osteolysis of the 1st distal phalanx with only the base remaining. Joints maintain anatomic alignment. Calcaneal enthesophytes at the insertion of the Achilles tendon and origin of the plantar aponeurosis. Soft tissue swelling predominantly in the forefoot. 0.2 cm radiopaque foreign body in the plantar forefoot at the level of the metatarsophalangeal joints. No soft tissue gas nor evident skin defect. LEFT FOOT:  Amputation of the 4th toe at the 4th metacarpophalangeal joint. No acute fractures nor areas of obvious abnormal cortical disruption. Unchanged findings of periostitis associated with the 3rd and 4th metatarsals. Remnant joints maintain anatomic alignment. Calcaneal enthesophytes at the insertion of the Achilles tendon and origin of the plantar aponeurosis. Suspected ulceration in the plantar forefoot at the level of the metatarsophalangeal joints. Soft tissue swelling predominantly in the forefoot. No soft tissue gas nor radiopaque foreign body. 1. Ulceration in the plantar left forefoot with no definite findings of underlying osteomyelitis or necrotizing fasciitis.   Soft tissue swelling in the left forefoot likely represent cellulitis. 2. Soft tissue swelling in the right forefoot also likely represents cellulitis. No findings of underlying necrotizing fasciitis. 0.2 cm radiopaque foreign body in the plantar right forefoot. 3. Extensive new destructive changes centered about the right 2nd through 5th tarsometatarsal joints, potentially related to chronic osteomyelitis or neuropathic arthropathy. Of note, there is a nonunited fracture of the proximal right 5th metatarsal shaft. 4. Increased osteolysis of the right 1st distal phalanx. Xr Foot Right (min 3 Views)     Result Date: 3/21/2019  EXAMINATION: 3 XRAY VIEWS OF THE RIGHT FOOT; 3 XRAY VIEWS OF THE LEFT FOOT 3/21/2019 3:46 am; 3/21/2019 3:47 am COMPARISON: Left foot radiograph 01/07/2019, right foot radiograph 05/21/2015 HISTORY: ORDERING SYSTEM PROVIDED HISTORY: toe pain TECHNOLOGIST PROVIDED HISTORY: Reason for exam:->toe pain Ordering Physician Provided Reason for Exam: toe pain Acuity: Acute Type of Exam: Initial Mechanism of Injury: toe pain Relevant Medical/Surgical History: toe pain; ORDERING SYSTEM PROVIDED HISTORY: ulcer to bottom of foot TECHNOLOGIST PROVIDED HISTORY: Reason for exam:->ulcer to bottom of foot Ordering Physician Provided Reason for Exam: ulcer to bottom of foot Acuity: Acute Type of Exam: Initial Mechanism of Injury: ulcer to bottom of foot Relevant Medical/Surgical History: ulcer to bottom of foot FINDINGS: RIGHT FOOT:  New irregularities involving the proximal 2nd through 5th metatarsals as well as the cuneiforms, including associated joint space loss and erosions. Associated chronic appearing fracture in the proximal shaft of the 5th metatarsal.  Increased osteolysis of the 1st distal phalanx with only the base remaining. Joints maintain anatomic alignment. Calcaneal enthesophytes at the insertion of the Achilles tendon and origin of the plantar aponeurosis. Soft tissue swelling predominantly in the forefoot.  0.2 cm radiopaque foreign body in the plantar forefoot at the level of the metatarsophalangeal joints. No soft tissue gas nor evident skin defect. LEFT FOOT:  Amputation of the 4th toe at the 4th metacarpophalangeal joint. No acute fractures nor areas of obvious abnormal cortical disruption. Unchanged findings of periostitis associated with the 3rd and 4th metatarsals. Remnant joints maintain anatomic alignment. Calcaneal enthesophytes at the insertion of the Achilles tendon and origin of the plantar aponeurosis. Suspected ulceration in the plantar forefoot at the level of the metatarsophalangeal joints. Soft tissue swelling predominantly in the forefoot. No soft tissue gas nor radiopaque foreign body. 1. Ulceration in the plantar left forefoot with no definite findings of underlying osteomyelitis or necrotizing fasciitis. Soft tissue swelling in the left forefoot likely represent cellulitis. 2. Soft tissue swelling in the right forefoot also likely represents cellulitis. No findings of underlying necrotizing fasciitis. 0.2 cm radiopaque foreign body in the plantar right forefoot. 3. Extensive new destructive changes centered about the right 2nd through 5th tarsometatarsal joints, potentially related to chronic osteomyelitis or neuropathic arthropathy. Of note, there is a nonunited fracture of the proximal right 5th metatarsal shaft. 4. Increased osteolysis of the right 1st distal phalanx. Xr Chest Portable     Result Date: 3/21/2019  EXAMINATION: SINGLE XRAY VIEW OF THE CHEST 3/21/2019 2:34 am COMPARISON: Chest radiograph 10/06/2018 HISTORY: ORDERING SYSTEM PROVIDED HISTORY: dizziness TECHNOLOGIST PROVIDED HISTORY: Reason for exam:->dizziness Ordering Physician Provided Reason for Exam: dizziness Acuity: Acute Type of Exam: Initial Mechanism of Injury: dizziness Relevant Medical/Surgical History: dizziness FINDINGS: Clear lungs. No findings of pneumothorax or pleural effusion. Normal mediastinal, hilar, and cardiac contours. No acute fracture. No acute cardiopulmonary process. Vl Dup Lower Extremity Venous Bilateral     Result Date: 3/21/2019  EXAMINATION: DUPLEX VENOUS ULTRASOUND OF THE BILATERAL LOWER EXTREMITIES, 3/21/2019 3:21 am TECHNIQUE: Duplex ultrasound and Doppler images were obtained of the bilateral lower extremities COMPARISON: January 11, 2019. HISTORY: ORDERING SYSTEM PROVIDED HISTORY: leg pain TECHNOLOGIST PROVIDED HISTORY: Reason for exam:->leg pain Ordering Physician Provided Reason for Exam: Bilat leg pain and swelling Acuity: Acute Type of Exam: Subsequent/Follow-up Additional signs and symptoms: Previous in January negative FINDINGS: The visualized veins of the bilateral lower extremities are patent and free of echogenic thrombus. The veins are normally compressible and have normal phasic flow. No evidence of DVT in either lower extremity.          Scheduled Medicines   Medications:   Scheduled Medications    insulin glargine  30 Units Subcutaneous Nightly    insulin lispro  25 Units Subcutaneous TID WC    insulin lispro  0-9 Units Subcutaneous 2 times per day    insulin lispro  0-18 Units Subcutaneous TID WC    piperacillin-tazobactam  3.375 g Intravenous Q6H    aspirin  81 mg Oral Daily    baclofen  10 mg Oral BID    diclofenac  1 patch Transdermal BID    hydrALAZINE  50 mg Oral TID    lisinopril  2.5 mg Oral Daily    pantoprazole  40 mg Oral BID AC    sodium chloride flush  10 mL Intravenous 2 times per day    enoxaparin  40 mg Subcutaneous Daily    vancomycin  1,500 mg Intravenous Q8H         Infusions:   Infusions Meds    dextrose      sodium chloride 100 mL/hr at 03/22/19 1450               IMPRESSION         Patient Active Problem List   Diagnosis    Hiatal hernia    Diabetes mellitus type 2, improved controlled    Diabetic neuropathy (HCC)    Panic attack    Anxiety associated with depression    Neck pain    DANIELA (obstructive sleep apnea)    Urinary frequency    Bilateral carpal tunnel syndrome- left worse than right    Hyperlipidemia with target LDL less than 100    HTN, goal below 140/90    Bronchitis    Osteomyelitis (HCC)    Diabetic ulcer of left midfoot (HCC)    WD-Diabetic ulcer of left midfoot associated with type 2 diabetes mellitus, with fat layer exposed ([address]    Abscess    Periumbilical hernia    WD-Wound, surgical, infected, initial encounter    Sepsis ([address]    Cellulitis of left foot    Constipation    Intractable nausea and vomiting    Uncontrolled type 2 diabetes mellitus (HCC)    Nausea and vomiting    Diabetic foot infection ([address]            RECOMMENDATIONS:       1. Reviewed POC blood glucose . Labs and X ray results   2. Reviewed Home and Current Medicines   3. Will Start On meal/ Correction bolus Humalog/ Lantus Insulin regime / OHGD ,Metformin+ Tradjenta   4. Monitor Blood glucose frequently   5. Modify  the dose of Insulin/ OHGD  as needed         Will follow with you  Again thank you for sharing pt's care with me. Truly yours,         Parminder Lizama DPM   Podiatrist   Podiatry   Consults   Signed   Date of Service:  3/24/2019 11:53 AM               Signed                Show:Clear all  [x]Manual[]Template[x]Copied    Added by:  [x]Jose Rincon DPM      []Angel for details      Consults   This patient was seen today 1 day postop from amputation of the right fifth digit from the right foot. Yesterday he developed severe postoperative pain and was placed back on morphine every 2 hours for pain. Today I find that he is a chronic pain patient and at home he was using 7.5 mg of Percocet. I'm not sure of his frequency. At this point he may need a pain consult or a hospitalist me want to convert him from morphine to Percocet. Objectively, he has staph aureus as cultured from the wound yesterday. The final results are not available yet.      Assessment:  Patient needs to get his pain control. Please discuss with hospitalist.  Dressing change will be carried out today by nursing staff. I will see this patient tomorrow for evaluation. Patient can be discharged per hospitalist and follow-up in my office. Disposition:   home  All the above has been explained to patient and or family. Patient would need F/U with his/her PCP in 7 days. Explained that it is the patients responsibility to have blood work or radiology testing done as an out patient. He/She has to take the discharge papers from the hospital for out patient follow up visit with the PCP/Physician. Time spent  >30 minutes.   Signed:  Miladis Valle MD, [address]  3/26/2019, 10:51 AM

## 2020-12-18 LAB
CYTO UR: NORMAL
LAB AP CASE REPORT: NORMAL
PATH REPORT.FINAL DX SPEC: NORMAL
PATH REPORT.GROSS SPEC: NORMAL

## 2021-01-27 NOTE — PROGRESS NOTES
Chief Complaint:   Chief Complaint   Patient presents with   • Follow-up     Pt presents today for dysphagia f/u-had endo/colon 12/17/2020; Pt states she is feeling better          Patient ID: Eliza Corbin is a 70 y.o. female     History of Present Illness: This is a very pleasant 70-year-old female who is here to follow-up status post EGD and colonoscopy.    The patient is status post EGD and colonoscopy on 12/17/2020 the patient had complaints for dysphagia with no endoscopic esophageal abnormality to explain dysphagia.  Esophagus was dilated. Mild gastritis with hemorrhage biopsied.  Biopsies revealed inflammation as expected nothing more serious.  Nisin fundoplication was noted.  Patient was instructed to return in 6 weeks if still having problems consider esophagram.  Affected dysphagia most likely due to nissen.  Colonoscopy was found to be normal with 10-year recall set.    The patient states that since her EGD was performed and dilated she has had no more difficulty whatsoever with swallowing.  She states however she is having some problems with constipation and does have to take pain medication which she states she knows slows bowel transit time down. The patient denies any nausea, vomiting, epigastric pain, dysphagia, pyrosis or hematemesis.  The patient denies any fever or chills.  Denies any melena or hematochezia.  Denies any unintentional weight loss or loss of appetite.        Past Medical History:   Diagnosis Date   • Anxiety    • Chronic back pain    • Family history of colonic polyps    • GERD (gastroesophageal reflux disease)    • Hiatal hernia    • History of colon polyps    • HTN (hypertension)    • Hypercholesteremia    • IBS (irritable bowel syndrome)    • Iron deficiency anemia    • PONV (postoperative nausea and vomiting)    • Stroke (CMS/HCC) 03/2020   • Vitamin D deficiency        Past Surgical History:   Procedure Laterality Date   • ANKLE FUSION Right    • APPENDECTOMY     • BILATERAL  BREAST REDUCTION     • CARDIAC CATHETERIZATION     • CERVICAL FUSION      X 4   • CHOLECYSTECTOMY     • COLONOSCOPY N/A 12/17/2020    The entire examined colon is normal on direct and retroflexion views; No specimens collected; Repeat 10 years   • COLONOSCOPY  07/23/2015    One 6mm hyperplastic polyp in the transverse colon; Repeat 5 years   • COLONOSCOPY  01/30/2008    Dr. Garland-Normal colonoscopy; Biopsies obtained to rule out collagenous or lymphocytic colitis   • ENDOSCOPY N/A 12/17/2020    No endoscopic esophageal abnormality to explain patient's dysphagia-Esophagus dilated; Bile gastritis with hemorrhage-biopsied; A Nissen fundoplication was found-The wrap appears intact; Normal examined duodenum   • ENDOSCOPY  07/18/2016    Normal esophagus; A Nissen fundoplication was found-The wrap appears intact; Normal stomach; Normal 1st part of the duodenum and 2nd part of the duodenum-biopsied    • ENDOSCOPY  07/23/2015    Gastric ulcers; Several biopsies for JANINE test were obtained; Repeat 3 months   • ENDOSCOPY  05/13/2009    Dr. Garland-Short esophagus; Moderate-sized HH; Gastritis-biopsied   • HIP BIPOLAR REPLACEMENT     • HYSTERECTOMY     • NISSEN FUNDOPLICATION     • REPLACEMENT TOTAL KNEE Bilateral    • SINUS SURGERY  03/2020   • TONSILLECTOMY     • TOTAL KNEE ARTHROPLASTY REVISION Right          Current Outpatient Medications:   •  albuterol sulfate  (90 Base) MCG/ACT inhaler, Inhale 2 puffs As Needed., Disp: , Rfl:   •  ALPRAZolam (XANAX) 0.25 MG tablet, Take 0.25 mg by mouth 2 (Two) Times a Day As Needed for Anxiety., Disp: , Rfl:   •  amLODIPine (NORVASC) 5 MG tablet, Take 5 mg by mouth Daily., Disp: , Rfl:   •  ARIPiprazole (ABILIFY) 2 MG tablet, Take 1 tablet by mouth Daily., Disp: , Rfl:   •  cetirizine (zyrTEC) 10 MG tablet, Take 10 mg by mouth Daily., Disp: , Rfl:   •  cloNIDine (CATAPRES) 0.1 MG tablet, Take 0.1 mg by mouth As Needed., Disp: , Rfl:   •  Cyanocobalamin 1000 MCG/ML kit, Inject 1 mL  "under the skin into the appropriate area as directed Every 30 (Thirty) Days., Disp: , Rfl:   •  DULoxetine (CYMBALTA) 30 MG capsule, Take 90 mg by mouth Daily., Disp: , Rfl:   •  fluticasone (Flonase) 50 MCG/ACT nasal spray, 2 sprays into the nostril(s) as directed by provider Daily., Disp: , Rfl:   •  hydroCHLOROthiazide (HYDRODIURIL) 25 MG tablet, Take 25 mg by mouth Daily., Disp: , Rfl:   •  HYDROcodone-acetaminophen (NORCO)  MG per tablet, Take 1 tablet by mouth Every 6 (Six) Hours As Needed for Moderate Pain ., Disp: , Rfl:   •  irbesartan (AVAPRO) 300 MG tablet, Take 300 mg by mouth Every Night., Disp: , Rfl:   •  metoprolol succinate XL (TOPROL-XL) 25 MG 24 hr tablet, Take 25 mg by mouth Daily., Disp: , Rfl:   •  montelukast (SINGULAIR) 10 MG tablet, Take 10 mg by mouth Every Night., Disp: , Rfl:   •  oxybutynin (DITROPAN) 5 MG tablet, Take 1 tablet by mouth Daily., Disp: , Rfl:     Allergies   Allergen Reactions   • Diovan [Valsartan] Other (See Comments)     \"Kidney failure\"   • Meloxicam Other (See Comments)     \"Kidney failure\"   • Declomycin [Demeclocycline] Rash       Social History     Socioeconomic History   • Marital status: Legally      Spouse name: Not on file   • Number of children: Not on file   • Years of education: Not on file   • Highest education level: Not on file   Tobacco Use   • Smoking status: Never Smoker   • Smokeless tobacco: Never Used   Substance and Sexual Activity   • Alcohol use: Yes     Comment: Occasionally    • Drug use: Never       Family History   Problem Relation Age of Onset   • Colon polyps Father         > 60 years old   • Brain cancer Brother    • Colon cancer Neg Hx    • Esophageal cancer Neg Hx    • Liver cancer Neg Hx    • Liver disease Neg Hx    • Rectal cancer Neg Hx    • Stomach cancer Neg Hx        Vitals:    01/28/21 1325   BP: 130/72   BP Location: Left arm   Patient Position: Sitting   Cuff Size: Adult   Pulse: 55   Temp: 97.7 °F (36.5 °C) " "  [facility]   SpO2: 98%   Weight: 69.4 kg (153 lb)   Height: 152.4 cm (60\")       Review of Systems:    General:    Present -feeling well   Skin:    Not Present-Rash   HEENT:     Not Present-Acute visual changes or Acute hearing changes   Neck :    Not Present- swollen glands   Genitourinary:      Not Present- burning, frequency, urgency hematuria, dysuria,   Cardiovascular:   Not Present-chest pain, palpitations, or pressure   Respiratory:   Not Present- shortness of breath or cough   Gastrointestinal:  Musculoskeletal:  Neurological:  Psychiatric:   Present as mentioned in the HP    Not Present. Recent gait disturbances.    Not Present-Seizures and weakness in extremities.    Not Present- Anxiety or Depression.       Physical Exam:    General Appearance:    Alert, cooperative, in no acute distress   Psych:    Mood appropriate    Eyes:          conjunctivae and sclerae normal, no   icterus, no pallor   ENMT:    Ears appear intact with no abnormalities noted oral mucosa moist   Neck:   No adenopathy, supple, trachea midline, no thyromegaly, no   carotid bruit, no JVD    Cardiovascular:    Regular rhythm and normal rate, normal S1 and S2, no            murmur, no gallop, no rub, no click   Gastrointestinal:     Inspection normal.  Normal bowel sounds, no masses, no organomegaly, soft round non-tender, non-distended, no guarding, no rebound or tenderness. No hepatosplenomegaly.   Skin:   No bleeding, bruising or rash   Neurologic:   nonfocal       Lab Results - Last 18 Months   Lab Units 04/20/20  1450 03/11/20  0828   GLUCOSE mg/dL 109 81   BUN mg/dL 21 16   CREATININE mg/dL 0.7 0.6   SODIUM mmol/L 140 143   POTASSIUM mmol/L 4.0 3.7   CHLORIDE mmol/L 99 101   TOTAL CO2 mmol/L 30* 30*       Lab Results - Last 18 Months   Lab Units 03/16/20  0543 03/15/20  1752 03/11/20  0828   HEMOGLOBIN g/dL  --  12.3 12.7   HEMATOCRIT %  --  36.4* 40.5   MCV fL  --  93.8 101.0*   WBC K/uL  --  13.8* 8.1   RDW %  --  11.7 " 12.3   MPV fL  --  9.3* 9.6   PLATELETS K/uL  --  303 278   INR  1.0 0.83*  --        Body mass index is 29.88 kg/m². Patient's Body mass index is 29.88 kg/m². BMI is above normal parameters. Recommendations include: nutrition counseling.    Assessment and Plan:  Assessment/Plan   Diagnoses and all orders for this visit:    1. Other dysphagia (Primary)    2. Other constipation      I have instructed the patient to start MiraLAX as directed over-the-counter.  Dysphagia resolved follow-up as needed.  Colon recall reset for 10 years.     There are no Patient Instructions on file for this visit.    Next follow-up appointment      EMR Dragon/Transcription disclaimer:  Much of this encounter note is an electronic transcription/translation of spoken language to printed text. The electronic translation of spoken language may permit erroneous, or at times, nonsensical words or phrases to be inadvertently transcribed; although I have reviewed the note for such errors, some may still exist.

## 2021-01-28 ENCOUNTER — OFFICE VISIT (OUTPATIENT)
Dept: GASTROENTEROLOGY | Facility: CLINIC | Age: 71
End: 2021-01-28

## 2021-01-28 VITALS
BODY MASS INDEX: 30.04 KG/M2 | DIASTOLIC BLOOD PRESSURE: 72 MMHG | OXYGEN SATURATION: 98 % | SYSTOLIC BLOOD PRESSURE: 130 MMHG | WEIGHT: 153 LBS | TEMPERATURE: 97.7 F | HEIGHT: 60 IN | HEART RATE: 55 BPM

## 2021-01-28 DIAGNOSIS — K59.09 OTHER CONSTIPATION: ICD-10-CM

## 2021-01-28 DIAGNOSIS — R13.19 OTHER DYSPHAGIA: Primary | ICD-10-CM

## 2021-01-28 PROCEDURE — 99213 OFFICE O/P EST LOW 20 MIN: CPT | Performed by: NURSE PRACTITIONER

## 2021-01-28 RX ORDER — OXYBUTYNIN CHLORIDE 5 MG/1
1 TABLET ORAL DAILY
COMMUNITY
Start: 2021-01-07

## 2021-01-28 RX ORDER — ARIPIPRAZOLE 2 MG/1
1 TABLET ORAL DAILY
COMMUNITY
Start: 2021-01-21

## 2021-01-28 RX ORDER — ALBUTEROL SULFATE 90 UG/1
2 AEROSOL, METERED RESPIRATORY (INHALATION) AS NEEDED
COMMUNITY

## 2021-01-28 RX ORDER — CETIRIZINE HYDROCHLORIDE 10 MG/1
10 TABLET ORAL DAILY
COMMUNITY

## 2021-01-28 RX ORDER — FLUTICASONE PROPIONATE 50 MCG
2 SPRAY, SUSPENSION (ML) NASAL DAILY
COMMUNITY

## 2021-01-28 RX ORDER — AMLODIPINE BESYLATE 5 MG/1
5 TABLET ORAL DAILY
COMMUNITY
Start: 2020-12-14

## 2021-03-03 ENCOUNTER — VIRTUAL VISIT (OUTPATIENT)
Dept: VASCULAR SURGERY | Age: 71
End: 2021-03-03
Payer: MEDICARE

## 2021-03-03 ENCOUNTER — HOSPITAL ENCOUNTER (OUTPATIENT)
Dept: VASCULAR LAB | Age: 71
Discharge: HOME | End: 2021-03-03
Payer: MEDICARE

## 2021-03-03 DIAGNOSIS — I70.213 ATHEROSCLEROSIS OF NATIVE ARTERY OF BOTH LOWER EXTREMITIES WITH INTERMITTENT CLAUDICATION (HCC): ICD-10-CM

## 2021-03-03 DIAGNOSIS — I73.9 PVD (PERIPHERAL VASCULAR DISEASE) (HCC): Primary | ICD-10-CM

## 2021-03-03 PROCEDURE — 99213 OFFICE O/P EST LOW 20 MIN: CPT | Performed by: PHYSICIAN ASSISTANT

## 2021-03-03 PROCEDURE — 3017F COLORECTAL CA SCREEN DOC REV: CPT | Performed by: PHYSICIAN ASSISTANT

## 2021-03-03 PROCEDURE — 4040F PNEUMOC VAC/ADMIN/RCVD: CPT | Performed by: PHYSICIAN ASSISTANT

## 2021-03-03 PROCEDURE — G8399 PT W/DXA RESULTS DOCUMENT: HCPCS | Performed by: PHYSICIAN ASSISTANT

## 2021-03-03 PROCEDURE — G8484 FLU IMMUNIZE NO ADMIN: HCPCS | Performed by: PHYSICIAN ASSISTANT

## 2021-03-03 PROCEDURE — 1090F PRES/ABSN URINE INCON ASSESS: CPT | Performed by: PHYSICIAN ASSISTANT

## 2021-03-03 PROCEDURE — G8428 CUR MEDS NOT DOCUMENT: HCPCS | Performed by: PHYSICIAN ASSISTANT

## 2021-03-03 PROCEDURE — 1036F TOBACCO NON-USER: CPT | Performed by: PHYSICIAN ASSISTANT

## 2021-03-03 PROCEDURE — G8417 CALC BMI ABV UP PARAM F/U: HCPCS | Performed by: PHYSICIAN ASSISTANT

## 2021-03-03 PROCEDURE — 93923 UPR/LXTR ART STDY 3+ LVLS: CPT

## 2021-03-03 PROCEDURE — 1123F ACP DISCUSS/DSCN MKR DOCD: CPT | Performed by: PHYSICIAN ASSISTANT
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
## 2021-03-11 ENCOUNTER — BULK ORDERING (OUTPATIENT)
Dept: CASE MANAGEMENT | Facility: OTHER | Age: 71
End: 2021-03-11

## 2021-03-11 DIAGNOSIS — Z23 IMMUNIZATION DUE: ICD-10-CM

## 2021-04-21 ENCOUNTER — TELEPHONE (OUTPATIENT)
Dept: CARDIOLOGY CLINIC | Age: 71
End: 2021-04-21

## 2021-04-21 ENCOUNTER — OFFICE VISIT (OUTPATIENT)
Dept: CARDIOLOGY CLINIC | Age: 71
End: 2021-04-21
Payer: MEDICARE

## 2021-04-21 VITALS
SYSTOLIC BLOOD PRESSURE: 106 MMHG | DIASTOLIC BLOOD PRESSURE: 62 MMHG | BODY MASS INDEX: 31.8 KG/M2 | HEART RATE: 45 BPM | WEIGHT: 162 LBS | HEIGHT: 60 IN

## 2021-04-21 DIAGNOSIS — R07.89 OTHER CHEST PAIN: ICD-10-CM

## 2021-04-21 DIAGNOSIS — I10 ESSENTIAL HYPERTENSION: ICD-10-CM

## 2021-04-21 DIAGNOSIS — R00.1 BRADYCARDIA: ICD-10-CM

## 2021-04-21 DIAGNOSIS — E78.2 MIXED HYPERLIPIDEMIA: ICD-10-CM

## 2021-04-21 DIAGNOSIS — I25.10 CORONARY ARTERY DISEASE INVOLVING NATIVE CORONARY ARTERY OF NATIVE HEART, ANGINA PRESENCE UNSPECIFIED: Primary | ICD-10-CM

## 2021-04-21 PROCEDURE — 1036F TOBACCO NON-USER: CPT | Performed by: CLINICAL NURSE SPECIALIST

## 2021-04-21 PROCEDURE — 1123F ACP DISCUSS/DSCN MKR DOCD: CPT | Performed by: CLINICAL NURSE SPECIALIST

## 2021-04-21 PROCEDURE — 1090F PRES/ABSN URINE INCON ASSESS: CPT | Performed by: CLINICAL NURSE SPECIALIST

## 2021-04-21 PROCEDURE — 4040F PNEUMOC VAC/ADMIN/RCVD: CPT | Performed by: CLINICAL NURSE SPECIALIST

## 2021-04-21 PROCEDURE — G8417 CALC BMI ABV UP PARAM F/U: HCPCS | Performed by: CLINICAL NURSE SPECIALIST

## 2021-04-21 PROCEDURE — G8427 DOCREV CUR MEDS BY ELIG CLIN: HCPCS | Performed by: CLINICAL NURSE SPECIALIST

## 2021-04-21 PROCEDURE — G8399 PT W/DXA RESULTS DOCUMENT: HCPCS | Performed by: CLINICAL NURSE SPECIALIST

## 2021-04-21 PROCEDURE — 93000 ELECTROCARDIOGRAM COMPLETE: CPT | Performed by: CLINICAL NURSE SPECIALIST

## 2021-04-21 PROCEDURE — 3017F COLORECTAL CA SCREEN DOC REV: CPT | Performed by: CLINICAL NURSE SPECIALIST

## 2021-04-21 PROCEDURE — 99214 OFFICE O/P EST MOD 30 MIN: CPT | Performed by: CLINICAL NURSE SPECIALIST

## 2021-04-21 RX ORDER — DULOXETIN HYDROCHLORIDE 30 MG/1
30 CAPSULE, DELAYED RELEASE ORAL DAILY
COMMUNITY

## 2021-04-21 RX ORDER — ARIPIPRAZOLE 2 MG/1
TABLET ORAL
COMMUNITY
Start: 2021-03-17

## 2021-04-21 RX ORDER — ASPIRIN 81 MG/1
81 TABLET ORAL DAILY
Qty: 90 TABLET | Refills: 1 | Status: SHIPPED | OUTPATIENT
Start: 2021-04-21 | End: 2021-08-06

## 2021-04-21 ASSESSMENT — ENCOUNTER SYMPTOMS
WHEEZING: 0
COUGH: 0
EYE REDNESS: 0
VOMITING: 0
FACIAL SWELLING: 0
SHORTNESS OF BREATH: 1
CHEST TIGHTNESS: 1
ABDOMINAL PAIN: 0
NAUSEA: 0

## 2021-04-21 NOTE — PATIENT INSTRUCTIONS
Return in about 9 months (around 1/21/2022) for APRN. Decrease Metoprolol to 12.5mg ONCE  A day due to slower heart rate    Stress Echo- hold metoprolol day before and morning of the procedure     LDL cholesterol ( bad cholesterol) goal is less than 70 with a history of heart disease. [facility] at the [facility] and [address] [facility] located on the first floor of [place] [address] through hospital main entrance and turn immediately to your left. Patient contact number:  [phone] (home)      Date/Arrival Time:  Thursday Apr 29 10:15      [facility] at the [facility] and [address] [facility] located on the first floor of [place] [address] through hospital main entrance and turn immediately to your left. Patient's contact number:  [phone] (home)     Date/Time:     Dobutamine Stress Test    A chemical stress test uses chemical agents injected into the body through the vein. These chemicals make the heart function as if it were under stress. A chemical stress test is used when a traditional stress test (called a cardiac stress test) cannot be done. Testing will take approximately one hour. Dobutamine Stress Echocardiogram Instructions:   No caffeine 24 hours prior to the testing. This includes: coffee, pop/soda, chocolate, cold medications, etc.  Any product that might contain caffeine. Do not eat or drink anything, except water, eight (8) hours before the test.   No alcohol or nicotine twelve (12) hours prior to your test.   Wear comfortable clothing. If you are taking metoprolol, hold night before and  morning of this procedure. If you need to change this appointment, please call outpatient scheduling at [phone].

## 2021-04-21 NOTE — Clinical Note
Pt will need a DSE instead of a SE.  I changed the ordered.   I didn't know if anything needed to be changed on your side
no

## 2021-04-21 NOTE — TELEPHONE ENCOUNTER
NO Answer; Unable to contact pt; Ondina Cohen has a HUGE gap in her schedule; Please ask pt if they can come in earlier.  Please and Thank you

## 2021-04-21 NOTE — PROGRESS NOTES
[facility], [address] [facility] [address] [place] [address]  Phone: [phone]  Fax: [phone]    OFFICE VISIT:  4/21/2021    [address]: 1950    Reason For Visit:  Monika Orozco is a 73 y.o. female who is here for Follow-up (patient c/o chest discomfort. ) and Coronary Artery Disease      HPI  Patient is here for follow-up for  Mild to moderate, nonocclusive CAD  with the  following cardiac history:  2/2/2014  lexiscan Positive for inferior apical myocardial ischemia, EF 62%, 2% ischemic myocardium on stress, low risk findings, AUC indication 15, AUC score 4  2/2/2014  Cath  Mild to moderate distal disease, normal LVFX  1/21/2016  lexiscan  Positive for inferor lateral MI + myocardial ischemia, EF 69%, 12/11% ischemic myocardium on stress, low risk findings, AUC indication 15, AUC score 4  1/22/16  Cath  Mild to moderate distal disease, normal LVFX     Patient states since her last visit here. She has had 2 episodes of nonexertional chest discomfort and associated jaw pain. She states she was sitting in a chair knitting when symptoms occurred. There is no associated dyspnea, nausea or diaphoresis. She states she \"passed gas and felt better. \"    She has had problems with statin intolerance in the past while on Lipitor and Crestor. We switched her to Zetia and she still felt she had some muscle aches and pains. Cholesterol is monitored by her PCP and we currently do not have any recent results    Rosalia Alexander DO is PCP.   Rebeca Garvin has the following history as recorded in [facility]:    Patient Active Problem List    Diagnosis Date Noted    S/P FESS (functional endoscopic sinus surgery) 11/19/2020    Deviated nasal septum 02/06/2020    Nasal turbinate hypertrophy 02/06/2020    Chronic pansinusitis 02/06/2020    Nasal obstruction 02/06/2020    Bradycardia 12/10/2019    Statin intolerance 12/10/2018    Abnormal nuclear cardiac imaging test     Positive cardiac stress test     Essential hypertension     Hypercholesteremia     Hyperlipidemia 01/06/2015    CAD (coronary artery disease)     Chest tightness or pressure 02/02/2014     Past Medical History:   Diagnosis Date    Anemia     Asthma     CAD (coronary artery disease)     Chest tightness or pressure 2/2/2014 2/2/2014  lexiscan Positive for inferior apical myocardial ischemia, EF 62%, 2% ischemic myocardium on stress, low risk findings, AUC indication 15, AUC score 4     Chronic back pain     Chronic kidney disease, stage III (moderate)     GFR 40 mL/minute 7/10/15. no problems now    History of blood transfusion     Hyperlipidemia 1/6/2015    Hypertension     Osteoarthritis     Osteoporosis 8/2015    PONV (postoperative nausea and vomiting)     Torn tendon     LT HIP     Past Surgical History:   Procedure Laterality Date    ANKLE SURGERY Right     Right ankle fusion.  APPENDECTOMY      BACK SURGERY      BREAST SURGERY Bilateral     Bilateral breast reduction.  CARDIAC CATHETERIZATION  2/2/14  JDT    EF 50%    CARDIAC CATHETERIZATION  01/2016    Normal EF, mild nonocclusive CAD    CERVICAL FUSION      The [address] [place] [address], Dr. Nixon Gentry.  CHOLECYSTECTOMY      COLONOSCOPY      DILATATION, ESOPHAGUS      ENDOSCOPY, COLON, DIAGNOSTIC      FRACTURE SURGERY      GASTRIC FUNDOPLICATION  9312    HERNIA REPAIR      SABINE    HYSTERECTOMY      total    JOINT REPLACEMENT      bilateral knees and hip    NASAL SINUS SURGERY N/A 3/11/2020    NASAL ENDOSCOPY, REPAIR OF NASAL SEPTUM, PARTIAL ETHMOIDECTOMY, CAUTERY TURBINATE MUCOSA, INTRAMURAL, THERAPEUTIC FRACTURE INFERIOR TURBINATES, OPEN MAXILLARY SINUS performed by Familia Garcias MD at [address] [address] TOTAL HIP ARTHROPLASTY  11/2014    TOTAL KNEE ARTHROPLASTY Bilateral     TOTAL KNEE ARTHROPLASTY Right 2002    Total knee replacement, right knee revision, Dr. Marian Maloney 2002. Family History   Problem Relation Age of Onset    Kidney Disease Mother     High Blood Pressure Mother     Other Mother         AAA    Heart Disease Father     Kidney Disease Father     Brain Cancer Brother     Stroke Paternal Grandfather      Social History     Tobacco Use    Smoking status: Passive Smoke Exposure - Never Smoker    Smokeless tobacco: Never Used   Substance Use Topics    Alcohol use: No      Current Outpatient Medications   Medication Sig Dispense Refill    DULoxetine (CYMBALTA) 60 MG extended release capsule Take 60 mg by mouth daily      ARIPiprazole (ABILIFY) 2 MG tablet TAKE 1 TABLET BY MOUTH EVERY DAY      metoprolol tartrate (LOPRESSOR) 25 MG tablet Take 0.5 tablets by mouth daily 90 tablet 3    aspirin EC 81 MG EC tablet Take 1 tablet by mouth daily 90 tablet 1    fluticasone (FLONASE ALLERGY RELIEF) 50 MCG/ACT nasal spray 1 spray by Each Nostril route daily      amLODIPine (NORVASC) 5 MG tablet Take 1 tablet by mouth daily 90 tablet 1    HYDROcodone-acetaminophen (NORCO)  MG per tablet Take 1 tablet by mouth every 6 hours as needed for Pain.  Cyanocobalamin (B-12 COMPLIANCE INJECTION IJ) Inject as directed      ondansetron (ZOFRAN ODT) 8 MG TBDP disintegrating tablet Place 1 tablet under the tongue every 8 hours as needed for Nausea or Vomiting 6 tablet 1    cloNIDine (CATAPRES) 0.1 MG tablet Take one tablet when BP is over 170/90. May take up to 3 tablets daily as needed. 90 tablet 3    hydrochlorothiazide (HYDRODIURIL) 25 MG tablet Take 25 mg by mouth daily      irbesartan (AVAPRO) 300 MG tablet Take 300 mg by mouth daily      oxybutynin (DITROPAN) 5 MG tablet Take 5 mg by mouth daily      montelukast (SINGULAIR) 10 MG tablet Take 10 mg by mouth daily      ALPRAZolam (XANAX) 0.25 MG tablet Take 0.25 mg by mouth nightly as needed.       PROVENTIL  (90 BASE) MCG/ACT inhaler Inhale 2 puffs into the lungs every 6 hours as needed        No current facility-administered medications for this visit. Allergies: Meloxicam, Diovan [valsartan], and Demeclocycline hcl    Review of Systems  Review of Systems   Constitutional: Negative for activity change, diaphoresis, fatigue, fever and unexpected weight change. HENT: Negative for facial swelling and nosebleeds. Eyes: Negative for redness and visual disturbance. Respiratory: Positive for chest tightness (with jaw pain) and shortness of breath (mild,chronic). Negative for cough and wheezing. Cardiovascular: Negative for chest pain, palpitations and leg swelling. Gastrointestinal: Negative for abdominal pain, nausea and vomiting. Endocrine: Negative for cold intolerance and heat intolerance. Genitourinary: Negative for dysuria and hematuria. Musculoskeletal: Negative for arthralgias and myalgias. Unsteady gait, joint pain, chronic left hip pain   Skin: Negative for pallor and rash. Neurological: Negative for dizziness, seizures, syncope, weakness and light-headedness. Hematological: Does not bruise/bleed easily. Psychiatric/Behavioral: Negative for agitation. The patient is not nervous/anxious. Objective  Vital Signs - /62   Pulse (!) 45   Ht 5' (1.524 m)   Wt 162 lb (73.5 kg)   BMI 31.64 kg/m²   Physical Exam  Vitals signs and nursing note reviewed. Constitutional:       General: She is not in acute distress. Appearance: Normal appearance. She is well-developed. She is not ill-appearing or diaphoretic. HENT:      Head: Normocephalic and atraumatic. Eyes:      General:         Right eye: No discharge. Left eye: No discharge. Pupils: Pupils are equal, round, and reactive to light. Neck:      Musculoskeletal: Neck supple. No muscular tenderness. Vascular: No carotid bruit or JVD. Trachea: No tracheal deviation. Cardiovascular:      Rate and Rhythm: Normal rate and regular rhythm. Heart sounds: Normal heart sounds. No murmur.  No friction rub. No gallop. Comments: No carotid bruit  Pulmonary:      Effort: Pulmonary effort is normal. No respiratory distress. Breath sounds: Normal breath sounds. No wheezing or rales. Abdominal:      Palpations: Abdomen is soft. Tenderness: There is no abdominal tenderness. Musculoskeletal:         General: No swelling. Right lower leg: Edema (2+) present. Left lower leg: Edema (1+) present. Comments: Ambulates with cane, unsteady gait   Skin:     General: Skin is warm and dry. Findings: No rash. Neurological:      Mental Status: She is alert and oriented to person, place, and time. Cranial Nerves: No cranial nerve deficit. Psychiatric:         Behavior: Behavior normal.         Judgment: Judgment normal.         Data:    Heart Cath 1/222/16  Summary:       1. Successful femoral artery ultrasound  3. Mild to moderate coronary artery disease  4. Left ventricular function is normal    EKG shows sinus bradycardia rate 45    Assessment:     Diagnosis Orders   1. Coronary artery disease involving native coronary artery of native heart, angina presence unspecified  EKG 12 lead   2. Other chest pain  ECHO Complete 2D W Doppler W Color    ECHO Pharmacological Stress Test   3. Mixed hyperlipidemia     4. Essential hypertension  EKG 12 lead   5. Bradycardia         CAD-mild to moderate per heart cath 2016. Having some nonexertional chest discomfort with associated jaw pain. Patient is very concerned about symptoms. We will check a dobutamine stress myocardial ischemia    Hypertension-stable on current regimen. She states she may use a clonidine once a week or so. Hypercholesterolemia/statin intolerance-has tried  Crestor,  Lipitor, and Zetia with myalgias. I have asked her to bring her labs in in the past, but she has not done so. Will request again from her PCP office. Bradycardia-heart rate is 45 today in the office.   We will cut back her metoprolol to 12.5 mg once a day only      Plan    Return in about 9 months (around 1/21/2022) for MICHELLE. Decrease Metoprolol to 12.5mg ONCE  A day due to slower heart rate    Dobutamine Stress Echo- hold metoprolol day before and morning of the procedure     LDL cholesterol ( bad cholesterol) goal is less than 70 with a history of heart disease. Call with any questionsor concerns  Follow up with Andrews Dee, DO for non cardiac problems  Report any new problems  Cardiovascular Fitness-Exercise as tolerated. Strive for 15 minutes of exercise most days of the week. Cardiac / HealthyDiet  Continue current medications as directed  Continue plan of treatment  It is always recommended that you bring your medicationsbottles with you to each visit - this is for your safety!        MICHELLE Mills

## 2021-04-23 ENCOUNTER — TELEPHONE (OUTPATIENT)
Dept: CARDIOLOGY CLINIC | Age: 71
End: 2021-04-23

## 2021-04-23 RX ORDER — AMLODIPINE BESYLATE 5 MG/1
5 TABLET ORAL DAILY
Qty: 90 TABLET | Refills: 1 | Status: SHIPPED | OUTPATIENT
Start: 2021-04-23 | End: 2022-01-24 | Stop reason: SDUPTHER

## 2021-04-23 RX ORDER — PRAVASTATIN SODIUM 20 MG
40 TABLET ORAL DAILY
Qty: 90 TABLET | Refills: 1 | Status: SHIPPED | OUTPATIENT
Start: 2021-04-23 | End: 2021-04-26

## 2021-04-23 NOTE — TELEPHONE ENCOUNTER
Patient notified of results and she voiced understanding. She agreed to try pravastatin. Med order placed.

## 2021-04-26 RX ORDER — PRAVASTATIN SODIUM 20 MG
20 TABLET ORAL DAILY
Qty: 90 TABLET | Refills: 1 | Status: SHIPPED | OUTPATIENT
Start: 2021-04-26 | End: 2022-01-24

## 2021-04-29 ENCOUNTER — HOSPITAL ENCOUNTER (OUTPATIENT)
Dept: NON INVASIVE DIAGNOSTICS | Age: 71
Discharge: HOME | End: 2021-04-29
Payer: MEDICARE

## 2021-04-29 DIAGNOSIS — R07.89 OTHER CHEST PAIN: ICD-10-CM

## 2021-04-29 LAB
LV EF: 58 %
LVEF MODALITY: NORMAL

## 2021-04-29 PROCEDURE — 93306 TTE W/DOPPLER COMPLETE: CPT

## 2021-05-03 RX ORDER — AMLODIPINE BESYLATE 5 MG/1
5 TABLET ORAL DAILY
Qty: 90 TABLET | Refills: 3 | Status: SHIPPED | OUTPATIENT
Start: 2021-05-03 | End: 2021-08-16

## 2021-05-17 ENCOUNTER — HOSPITAL ENCOUNTER (OUTPATIENT)
Dept: NON INVASIVE DIAGNOSTICS | Age: 71
Discharge: HOME | End: 2021-05-17
Payer: MEDICARE

## 2021-05-17 VITALS
BODY MASS INDEX: 31.64 KG/M2 | WEIGHT: 162 LBS | HEART RATE: 74 BPM | SYSTOLIC BLOOD PRESSURE: 151 MMHG | DIASTOLIC BLOOD PRESSURE: 83 MMHG

## 2021-05-17 DIAGNOSIS — R07.89 OTHER CHEST PAIN: ICD-10-CM

## 2021-05-17 PROCEDURE — 93350 STRESS TTE ONLY: CPT

## 2021-05-17 PROCEDURE — 6360000002 HC RX W HCPCS: Performed by: INTERNAL MEDICINE

## 2021-05-17 PROCEDURE — 2580000003 HC RX 258: Performed by: INTERNAL MEDICINE

## 2021-05-17 PROCEDURE — 2500000003 HC RX 250 WO HCPCS: Performed by: INTERNAL MEDICINE

## 2021-05-17 RX ORDER — METOPROLOL TARTRATE 5 MG/5ML
5 INJECTION INTRAVENOUS PRN
Status: DISCONTINUED | OUTPATIENT
Start: 2021-05-17 | End: 2021-05-18 | Stop reason: HOSPADM

## 2021-05-17 RX ORDER — DOBUTAMINE HYDROCHLORIDE 200 MG/100ML
2.5-4 INJECTION INTRAVENOUS CONTINUOUS PRN
Status: DISCONTINUED | OUTPATIENT
Start: 2021-05-17 | End: 2021-05-18 | Stop reason: HOSPADM

## 2021-05-17 RX ORDER — SODIUM CHLORIDE 9 MG/ML
INJECTION, SOLUTION INTRAVENOUS
Status: COMPLETED | OUTPATIENT
Start: 2021-05-17 | End: 2021-05-17

## 2021-05-17 RX ADMIN — SODIUM CHLORIDE: 9 INJECTION, SOLUTION INTRAVENOUS at 11:43

## 2021-05-17 RX ADMIN — DOBUTAMINE HYDROCHLORIDE 10 MCG/KG/MIN: 200 INJECTION INTRAVENOUS at 11:43

## 2021-05-17 RX ADMIN — METOPROLOL TARTRATE 3 MG: 1 INJECTION, SOLUTION INTRAVENOUS at 11:50

## 2021-06-01 ENCOUNTER — HOSPITAL ENCOUNTER (OUTPATIENT)
Dept: GENERAL RADIOLOGY | Age: 71
Discharge: HOME | End: 2021-06-01
Payer: MEDICARE
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
## 2021-06-01 DIAGNOSIS — M54.50 LOW BACK PAIN, UNSPECIFIED BACK PAIN LATERALITY, UNSPECIFIED CHRONICITY, UNSPECIFIED WHETHER SCIATICA PRESENT: ICD-10-CM

## 2021-06-01 PROCEDURE — 72170 X-RAY EXAM OF PELVIS: CPT

## 2021-06-01 PROCEDURE — 72100 X-RAY EXAM L-S SPINE 2/3 VWS: CPT

## 2021-08-06 ENCOUNTER — NURSE TRIAGE (OUTPATIENT)
Dept: CALL CENTER | Facility: HOSPITAL | Age: 71
End: 2021-08-06

## 2021-08-06 ENCOUNTER — HOSPITAL ENCOUNTER (OUTPATIENT)
Dept: ULTRASOUND IMAGING | Facility: HOSPITAL | Age: 71
Discharge: HOME | End: 2021-08-06

## 2021-08-06 ENCOUNTER — HOSPITAL ENCOUNTER (EMERGENCY)
Age: 71
Discharge: HOME | End: 2021-08-06
Attending: EMERGENCY MEDICINE
Payer: MEDICARE

## 2021-08-06 ENCOUNTER — TRANSCRIBE ORDERS (OUTPATIENT)
Dept: ADMINISTRATIVE | Facility: HOSPITAL | Age: 71
End: 2021-08-06

## 2021-08-06 ENCOUNTER — HOSPITAL ENCOUNTER (OUTPATIENT)
Dept: GENERAL RADIOLOGY | Facility: HOSPITAL | Age: 71
Discharge: HOME | End: 2021-08-06

## 2021-08-06 VITALS
HEIGHT: 60 IN | SYSTOLIC BLOOD PRESSURE: 121 MMHG | TEMPERATURE: 98.2 F | RESPIRATION RATE: 18 BRPM | WEIGHT: 163 LBS | BODY MASS INDEX: 32 KG/M2 | DIASTOLIC BLOOD PRESSURE: 67 MMHG | OXYGEN SATURATION: 95 % | HEART RATE: 74 BPM

## 2021-08-06 DIAGNOSIS — M79.604 PAIN IN RIGHT LEG: ICD-10-CM

## 2021-08-06 DIAGNOSIS — M25.551 PAIN IN RIGHT HIP: ICD-10-CM

## 2021-08-06 DIAGNOSIS — I82.431 ACUTE DEEP VEIN THROMBOSIS (DVT) OF POPLITEAL VEIN OF RIGHT LOWER EXTREMITY (HCC): Primary | ICD-10-CM

## 2021-08-06 DIAGNOSIS — R22.43 LOCALIZED SWELLING, MASS AND LUMP, LOWER LIMB, BILATERAL: Primary | ICD-10-CM

## 2021-08-06 DIAGNOSIS — Z86.79 HISTORY OF INTRACRANIAL HEMORRHAGE: ICD-10-CM

## 2021-08-06 LAB
ALBUMIN SERPL-MCNC: 4.5 G/DL (ref 3.5–5.2)
ALP BLD-CCNC: 134 U/L (ref 35–104)
ALT SERPL-CCNC: 10 U/L (ref 5–33)
ANION GAP SERPL CALCULATED.3IONS-SCNC: 13 MMOL/L (ref 7–19)
AST SERPL-CCNC: 18 U/L (ref 5–32)
BASOPHILS ABSOLUTE: 0.1 K/UL (ref 0–0.2)
BASOPHILS RELATIVE PERCENT: 1 % (ref 0–1)
BILIRUB SERPL-MCNC: 0.8 MG/DL (ref 0.2–1.2)
BUN BLDV-MCNC: 12 MG/DL (ref 8–23)
CALCIUM SERPL-MCNC: 10.3 MG/DL (ref 8.8–10.2)
CHLORIDE BLD-SCNC: 98 MMOL/L (ref 98–111)
CO2: 30 MMOL/L (ref 22–29)
CREAT SERPL-MCNC: 0.7 MG/DL (ref 0.5–0.9)
EOSINOPHILS ABSOLUTE: 0.9 K/UL (ref 0–0.6)
EOSINOPHILS RELATIVE PERCENT: 8.9 % (ref 0–5)
GFR AFRICAN AMERICAN: >59
GFR NON-AFRICAN AMERICAN: >60
GLUCOSE BLD-MCNC: 93 MG/DL (ref 74–109)
HCT VFR BLD CALC: 40.3 % (ref 37–47)
HEMOGLOBIN: 13 G/DL (ref 12–16)
IMMATURE GRANULOCYTES #: 0 K/UL
INR BLD: 0.93 (ref 0.88–1.18)
LYMPHOCYTES ABSOLUTE: 2.6 K/UL (ref 1.1–4.5)
LYMPHOCYTES RELATIVE PERCENT: 25.5 % (ref 20–40)
MCH RBC QN AUTO: 31.6 PG (ref 27–31)
MCHC RBC AUTO-ENTMCNC: 32.3 G/DL (ref 33–37)
MCV RBC AUTO: 97.8 FL (ref 81–99)
MONOCYTES ABSOLUTE: 0.8 K/UL (ref 0–0.9)
MONOCYTES RELATIVE PERCENT: 7.4 % (ref 0–10)
NEUTROPHILS ABSOLUTE: 5.7 K/UL (ref 1.5–7.5)
NEUTROPHILS RELATIVE PERCENT: 56.9 % (ref 50–65)
PDW BLD-RTO: 12.3 % (ref 11.5–14.5)
PLATELET # BLD: 343 K/UL (ref 130–400)
PMV BLD AUTO: 8.9 FL (ref 9.4–12.3)
POTASSIUM REFLEX MAGNESIUM: 3.6 MMOL/L (ref 3.5–5)
PROTHROMBIN TIME: 12.7 SEC (ref 12–14.6)
RBC # BLD: 4.12 M/UL (ref 4.2–5.4)
SODIUM BLD-SCNC: 141 MMOL/L (ref 136–145)
TOTAL PROTEIN: 7.5 G/DL (ref 6.6–8.7)
WBC # BLD: 10.1 K/UL (ref 4.8–10.8)

## 2021-08-06 PROCEDURE — 80053 COMPREHEN METABOLIC PANEL: CPT

## 2021-08-06 PROCEDURE — 73502 X-RAY EXAM HIP UNI 2-3 VIEWS: CPT

## 2021-08-06 PROCEDURE — 85025 COMPLETE CBC W/AUTO DIFF WBC: CPT

## 2021-08-06 PROCEDURE — 6370000000 HC RX 637 (ALT 250 FOR IP): Performed by: NURSE PRACTITIONER

## 2021-08-06 PROCEDURE — 93970 EXTREMITY STUDY: CPT

## 2021-08-06 PROCEDURE — 36415 COLL VENOUS BLD VENIPUNCTURE: CPT

## 2021-08-06 PROCEDURE — 85610 PROTHROMBIN TIME: CPT

## 2021-08-06 PROCEDURE — 99283 EMERGENCY DEPT VISIT LOW MDM: CPT

## 2021-08-06 RX ORDER — ASPIRIN 325 MG
325 TABLET ORAL DAILY
Qty: 30 TABLET | Refills: 3 | Status: SHIPPED | OUTPATIENT
Start: 2021-08-06 | End: 2022-01-24

## 2021-08-06 RX ORDER — CLOPIDOGREL BISULFATE 75 MG/1
75 TABLET ORAL ONCE
Status: COMPLETED | OUTPATIENT
Start: 2021-08-06 | End: 2021-08-06

## 2021-08-06 RX ORDER — CLOPIDOGREL BISULFATE 75 MG/1
75 TABLET ORAL DAILY
Qty: 30 TABLET | Refills: 0 | Status: SHIPPED | OUTPATIENT
Start: 2021-08-06 | End: 2022-01-24

## 2021-08-06 RX ADMIN — CLOPIDOGREL BISULFATE 75 MG: 75 TABLET ORAL at 19:39

## 2021-08-06 ASSESSMENT — ENCOUNTER SYMPTOMS: SHORTNESS OF BREATH: 0

## 2021-08-06 ASSESSMENT — PAIN SCALES - GENERAL: PAINLEVEL_OUTOF10: 8

## 2021-08-06 NOTE — TELEPHONE ENCOUNTER
Call from Molly at [facility] that patient has a positive result for a blood clot in her calf. Dr. Arvizu notified and will return call.
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
## 2021-08-10 ENCOUNTER — TRANSCRIBE ORDERS (OUTPATIENT)
Dept: ADMINISTRATIVE | Facility: HOSPITAL | Age: 71
End: 2021-08-10

## 2021-08-10 DIAGNOSIS — M79.604 PAIN IN RIGHT LEG: ICD-10-CM

## 2021-08-10 DIAGNOSIS — M25.551 PAIN IN RIGHT HIP: Primary | ICD-10-CM

## 2021-08-10 DIAGNOSIS — R22.43 LOCALIZED SWELLING, MASS AND LUMP, LOWER LIMB, BILATERAL: ICD-10-CM

## 2021-08-11 ENCOUNTER — HOSPITAL ENCOUNTER (OUTPATIENT)
Dept: CT IMAGING | Facility: HOSPITAL | Age: 71
Discharge: HOME | End: 2021-08-11
Admitting: FAMILY MEDICINE

## 2021-08-11 ENCOUNTER — APPOINTMENT (OUTPATIENT)
Dept: ULTRASOUND IMAGING | Facility: HOSPITAL | Age: 71
End: 2021-08-11

## 2021-08-11 DIAGNOSIS — M79.604 PAIN IN RIGHT LEG: ICD-10-CM

## 2021-08-11 DIAGNOSIS — M25.551 PAIN IN RIGHT HIP: ICD-10-CM

## 2021-08-11 DIAGNOSIS — R22.43 LOCALIZED SWELLING, MASS AND LUMP, LOWER LIMB, BILATERAL: ICD-10-CM

## 2021-08-11 PROCEDURE — 73700 CT LOWER EXTREMITY W/O DYE: CPT

## 2021-08-12 ENCOUNTER — TRANSCRIBE ORDERS (OUTPATIENT)
Dept: LAB | Facility: HOSPITAL | Age: 71
End: 2021-08-12

## 2021-08-12 DIAGNOSIS — R06.2 WHEEZING: ICD-10-CM

## 2021-08-12 DIAGNOSIS — R05.9 COUGH: ICD-10-CM

## 2021-08-12 DIAGNOSIS — Z01.818 PREOPERATIVE TESTING: ICD-10-CM

## 2021-08-12 DIAGNOSIS — R53.83 OTHER FATIGUE: Primary | ICD-10-CM

## 2021-08-13 ENCOUNTER — LAB (OUTPATIENT)
Dept: LAB | Facility: HOSPITAL | Age: 71
End: 2021-08-13

## 2021-08-13 DIAGNOSIS — R53.83 OTHER FATIGUE: ICD-10-CM

## 2021-08-13 DIAGNOSIS — R05.9 COUGH: ICD-10-CM

## 2021-08-13 DIAGNOSIS — R06.2 WHEEZING: ICD-10-CM

## 2021-08-13 LAB — SARS-COV-2 ORF1AB RESP QL NAA+PROBE: NOT DETECTED

## 2021-08-13 PROCEDURE — U0004 COV-19 TEST NON-CDC HGH THRU: HCPCS

## 2021-08-13 PROCEDURE — C9803 HOPD COVID-19 SPEC COLLECT: HCPCS

## 2021-08-13 PROCEDURE — U0005 INFEC AGEN DETEC AMPLI PROBE: HCPCS

## 2021-08-16 RX ORDER — CETIRIZINE HYDROCHLORIDE 10 MG/1
10 TABLET ORAL DAILY
COMMUNITY

## 2021-08-16 RX ORDER — METHYLPREDNISOLONE 4 MG/1
2 TABLET ORAL DAILY
COMMUNITY
End: 2022-01-24

## 2021-08-20 ENCOUNTER — OFFICE VISIT (OUTPATIENT)
Dept: VASCULAR SURGERY | Age: 71
End: 2021-08-20
Payer: MEDICARE

## 2021-08-20 ENCOUNTER — HOSPITAL ENCOUNTER (OUTPATIENT)
Dept: ULTRASOUND IMAGING | Facility: HOSPITAL | Age: 71
Discharge: HOME | End: 2021-08-20
Admitting: FAMILY MEDICINE

## 2021-08-20 VITALS
RESPIRATION RATE: 16 BRPM | WEIGHT: 163 LBS | OXYGEN SATURATION: 97 % | HEART RATE: 63 BPM | BODY MASS INDEX: 32 KG/M2 | HEIGHT: 60 IN | DIASTOLIC BLOOD PRESSURE: 78 MMHG | SYSTOLIC BLOOD PRESSURE: 123 MMHG | TEMPERATURE: 97.9 F

## 2021-08-20 DIAGNOSIS — R22.43 LOCALIZED SWELLING, MASS AND LUMP, LOWER LIMB, BILATERAL: ICD-10-CM

## 2021-08-20 DIAGNOSIS — I82.542 CHRONIC DEEP VEIN THROMBOSIS (DVT) OF TIBIAL VEIN OF LEFT LOWER EXTREMITY (HCC): ICD-10-CM

## 2021-08-20 DIAGNOSIS — M79.604 PAIN IN RIGHT LEG: ICD-10-CM

## 2021-08-20 DIAGNOSIS — M25.551 PAIN IN RIGHT HIP: ICD-10-CM

## 2021-08-20 DIAGNOSIS — M79.89 LEG SWELLING: Primary | ICD-10-CM

## 2021-08-20 PROCEDURE — G8399 PT W/DXA RESULTS DOCUMENT: HCPCS | Performed by: PHYSICIAN ASSISTANT

## 2021-08-20 PROCEDURE — 3017F COLORECTAL CA SCREEN DOC REV: CPT | Performed by: PHYSICIAN ASSISTANT

## 2021-08-20 PROCEDURE — G8427 DOCREV CUR MEDS BY ELIG CLIN: HCPCS | Performed by: PHYSICIAN ASSISTANT

## 2021-08-20 PROCEDURE — 99213 OFFICE O/P EST LOW 20 MIN: CPT | Performed by: PHYSICIAN ASSISTANT

## 2021-08-20 PROCEDURE — 1090F PRES/ABSN URINE INCON ASSESS: CPT | Performed by: PHYSICIAN ASSISTANT

## 2021-08-20 PROCEDURE — 93971 EXTREMITY STUDY: CPT

## 2021-08-20 PROCEDURE — 1123F ACP DISCUSS/DSCN MKR DOCD: CPT | Performed by: PHYSICIAN ASSISTANT

## 2021-08-20 PROCEDURE — G8417 CALC BMI ABV UP PARAM F/U: HCPCS | Performed by: PHYSICIAN ASSISTANT

## 2021-08-20 PROCEDURE — 1036F TOBACCO NON-USER: CPT | Performed by: PHYSICIAN ASSISTANT

## 2021-08-20 PROCEDURE — 4040F PNEUMOC VAC/ADMIN/RCVD: CPT | Performed by: PHYSICIAN ASSISTANT
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
## 2021-08-25 DIAGNOSIS — I82.542 CHRONIC DEEP VEIN THROMBOSIS (DVT) OF TIBIAL VEIN OF LEFT LOWER EXTREMITY (HCC): Primary | ICD-10-CM

## 2021-08-25 DIAGNOSIS — M79.89 LEG SWELLING: ICD-10-CM

## 2021-08-31 ENCOUNTER — LAB (OUTPATIENT)
Dept: LAB | Facility: HOSPITAL | Age: 71
End: 2021-08-31

## 2021-08-31 ENCOUNTER — TRANSCRIBE ORDERS (OUTPATIENT)
Dept: LAB | Facility: HOSPITAL | Age: 71
End: 2021-08-31

## 2021-08-31 DIAGNOSIS — R50.9 FEVER, UNSPECIFIED: ICD-10-CM

## 2021-08-31 DIAGNOSIS — R05.9 COUGH: ICD-10-CM

## 2021-08-31 DIAGNOSIS — J06.9 ACUTE UPPER RESPIRATORY INFECTION: Primary | ICD-10-CM

## 2021-08-31 DIAGNOSIS — J45.41 MODERATE PERSISTENT ASTHMA WITH (ACUTE) EXACERBATION: ICD-10-CM

## 2021-08-31 LAB — SARS-COV-2 ORF1AB RESP QL NAA+PROBE: NOT DETECTED

## 2021-08-31 PROCEDURE — U0005 INFEC AGEN DETEC AMPLI PROBE: HCPCS | Performed by: PHYSICIAN ASSISTANT

## 2021-08-31 PROCEDURE — U0004 COV-19 TEST NON-CDC HGH THRU: HCPCS | Performed by: PHYSICIAN ASSISTANT

## 2021-08-31 PROCEDURE — C9803 HOPD COVID-19 SPEC COLLECT: HCPCS | Performed by: PHYSICIAN ASSISTANT

## 2021-09-02 ENCOUNTER — HOSPITAL ENCOUNTER (OUTPATIENT)
Dept: CT IMAGING | Age: 71
Discharge: HOME | End: 2021-09-02
Payer: MEDICARE

## 2021-09-02 DIAGNOSIS — I82.542 CHRONIC DEEP VEIN THROMBOSIS (DVT) OF TIBIAL VEIN OF LEFT LOWER EXTREMITY (HCC): ICD-10-CM

## 2021-09-02 DIAGNOSIS — M79.89 LEG SWELLING: ICD-10-CM

## 2021-09-02 PROCEDURE — 2500000003 HC RX 250 WO HCPCS: Performed by: PHYSICIAN ASSISTANT

## 2021-09-02 PROCEDURE — 2580000003 HC RX 258: Performed by: PHYSICIAN ASSISTANT

## 2021-09-02 PROCEDURE — 6360000004 HC RX CONTRAST MEDICATION: Performed by: PHYSICIAN ASSISTANT

## 2021-09-02 PROCEDURE — 74174 CTA ABD&PLVS W/CONTRAST: CPT

## 2021-09-02 RX ADMIN — IOPAMIDOL 90 ML: 755 INJECTION, SOLUTION INTRAVENOUS at 12:36

## 2021-09-02 RX ADMIN — SODIUM BICARBONATE: 84 INJECTION, SOLUTION INTRAVENOUS at 09:00

## 2021-09-17 ENCOUNTER — TELEPHONE (OUTPATIENT)
Dept: VASCULAR SURGERY | Age: 71
End: 2021-09-17

## 2021-09-17 DIAGNOSIS — M79.89 LEG SWELLING: ICD-10-CM

## 2021-09-17 DIAGNOSIS — Z86.718 HX OF DEEP VENOUS THROMBOSIS: ICD-10-CM

## 2021-09-17 DIAGNOSIS — M79.604 PAIN OF RIGHT LOWER EXTREMITY: ICD-10-CM

## 2021-09-17 DIAGNOSIS — I73.9 PVD (PERIPHERAL VASCULAR DISEASE) (HCC): Primary | ICD-10-CM

## 2021-09-17 NOTE — TELEPHONE ENCOUNTER
Spoke with patient to let her know know that Dr. Chuy Perez reviewed her CT scan. [facility] does not see anything concerning that would be causing her lower extremity swelling.  Recommend she continue her Xarelto as prescribed. She should continue compression stockings and leg elevation.  We will need to repeat a right leg venous scan in 6 months.  If she is agreeable please place order.   Patient is agreeable and voiced understanding

## 2021-09-17 NOTE — TELEPHONE ENCOUNTER
----- Message from Shi Huang PA-C sent at 9/16/2021  5:49 PM CDT -----  Please call and let MsZehra Jannie know that Dr. Juliocesar Reardon reviewed her CT scan. He does not see anything concerning that would be causing her lower extremity swelling. Recommend she continue her Xarelto as prescribed. She should continue compression stockings and leg elevation. We will need to repeat a right leg venous scan in 6 months. If she is agreeable please place order.

## 2021-09-22 ENCOUNTER — TRANSCRIBE ORDERS (OUTPATIENT)
Dept: LAB | Facility: HOSPITAL | Age: 71
End: 2021-09-22

## 2021-09-22 ENCOUNTER — LAB (OUTPATIENT)
Dept: LAB | Facility: HOSPITAL | Age: 71
End: 2021-09-22

## 2021-09-22 DIAGNOSIS — J45.41 MODERATE PERSISTENT ASTHMA WITH EXACERBATION: Primary | ICD-10-CM

## 2021-09-22 DIAGNOSIS — R06.02 SHORTNESS OF BREATH: ICD-10-CM

## 2021-09-22 DIAGNOSIS — R05.9 COUGH: ICD-10-CM

## 2021-09-22 LAB — SARS-COV-2 ORF1AB RESP QL NAA+PROBE: NOT DETECTED

## 2021-09-22 PROCEDURE — U0005 INFEC AGEN DETEC AMPLI PROBE: HCPCS | Performed by: PHYSICIAN ASSISTANT

## 2021-09-22 PROCEDURE — U0004 COV-19 TEST NON-CDC HGH THRU: HCPCS | Performed by: PHYSICIAN ASSISTANT

## 2021-09-22 PROCEDURE — C9803 HOPD COVID-19 SPEC COLLECT: HCPCS | Performed by: PHYSICIAN ASSISTANT

## 2021-10-01 ENCOUNTER — OFFICE VISIT (OUTPATIENT)
Dept: NEUROLOGY | Age: 71
End: 2021-10-01
Payer: MEDICARE

## 2021-10-01 VITALS
DIASTOLIC BLOOD PRESSURE: 68 MMHG | SYSTOLIC BLOOD PRESSURE: 107 MMHG | HEIGHT: 60 IN | OXYGEN SATURATION: 98 % | WEIGHT: 163 LBS | BODY MASS INDEX: 32 KG/M2 | HEART RATE: 53 BPM

## 2021-10-01 DIAGNOSIS — Z78.9 DIFFICULTY WITH CPAP FULL FACE MASK USE: ICD-10-CM

## 2021-10-01 DIAGNOSIS — G47.33 OBSTRUCTIVE SLEEP APNEA: Primary | ICD-10-CM

## 2021-10-01 PROCEDURE — 1036F TOBACCO NON-USER: CPT | Performed by: PHYSICIAN ASSISTANT

## 2021-10-01 PROCEDURE — 3017F COLORECTAL CA SCREEN DOC REV: CPT | Performed by: PHYSICIAN ASSISTANT

## 2021-10-01 PROCEDURE — G8399 PT W/DXA RESULTS DOCUMENT: HCPCS | Performed by: PHYSICIAN ASSISTANT

## 2021-10-01 PROCEDURE — 1090F PRES/ABSN URINE INCON ASSESS: CPT | Performed by: PHYSICIAN ASSISTANT

## 2021-10-01 PROCEDURE — G8417 CALC BMI ABV UP PARAM F/U: HCPCS | Performed by: PHYSICIAN ASSISTANT

## 2021-10-01 PROCEDURE — 99213 OFFICE O/P EST LOW 20 MIN: CPT | Performed by: PHYSICIAN ASSISTANT

## 2021-10-01 PROCEDURE — G8484 FLU IMMUNIZE NO ADMIN: HCPCS | Performed by: PHYSICIAN ASSISTANT

## 2021-10-01 PROCEDURE — 1123F ACP DISCUSS/DSCN MKR DOCD: CPT | Performed by: PHYSICIAN ASSISTANT

## 2021-10-01 PROCEDURE — G8427 DOCREV CUR MEDS BY ELIG CLIN: HCPCS | Performed by: PHYSICIAN ASSISTANT

## 2021-10-01 PROCEDURE — 4040F PNEUMOC VAC/ADMIN/RCVD: CPT | Performed by: PHYSICIAN ASSISTANT

## 2021-10-01 NOTE — PATIENT INSTRUCTIONS
several deep breaths to catch up on breathing. As the person awakens, he or she may move briefly, snort or snore, and take a deep breath. Less frequently, a person may awaken completely with a sensation of gasping, smothering, or choking. If the person falls back to sleep quickly, he or she will not remember the event. Many people with sleep apnea are unaware of their abnormal breathing in sleep, and all patients underestimate how often their sleep is interrupted. Awakening from sleep causes sleep to be unrefreshing and causes fatigue and daytime sleepiness. Anatomic causes of obstructive sleep apnea   Most patients have STEPHANIE because of a small upper airway. As the bones of the face and skull develop, some people develop a small lower face, a small mouth, and a tongue that seems too large for the mouth. These features are genetically determined, which explains why STEPHANIE tends to cluster in families. Obesity is another major factor. Tonsil enlargement can be an important cause, especially in children. SLEEP APNEA SYMPTOMS  The main symptoms of STEPHANIE are loud snoring, fatigue, and daytime sleepiness. However, some people have no symptoms. For example, if the person does not have a bed partner, he or she may not be aware of the snoring. Fatigue and sleepiness have many causes and are often attributed to overwork and increasing age. As a result, a person may be slow to recognize that they have a problem. A bed partner or spouse often prompts the patient to seek medical care. Other symptoms may include one or more of the following:  ?Restless sleep  ? Awakening with choking, gasping, or smothering  ? Morning headaches, dry mouth, or sore throat  ? Waking frequently to urinate  ? Awakening unrested, groggy  ? Low energy, difficulty concentrating, memory impairment    Risk factors  Certain factors increase the risk of sleep apnea.   ?Increasing age [de-identified] STEPHANIE occurs at all ages, but it is more common in middle and older age adults. ?Male sex  STEPHANIE is two times more common in men, especially in middle age. ?Obesity  The more obese a person is, the more likely he or she is to have STEPHANIE. ? Sedation from medication or alcohol  This interferes with the ability to awaken from sleep and can lengthen periods of apnea (no breathing), with potentially dangerous consequences. ? Abnormality of the airway. SLEEP APNEA CONSEQUENCES  Complications of sleep apnea can include daytime sleepiness and difficulty concentrating. The consequence of this is an increased risk of accidents and errors in daily activities. Studies have shown that people with severe STEPHANIE are more than twice as likely to be involved in a motor vehicle accident as people without these conditions. People with STEPHANIE are encouraged to discuss options for driving, working, and performing other high-risk tasks with a healthcare provider. In addition, people with untreated STEPHANIE may have an increased risk of cardiovascular problems such as high blood pressure, heart attack, abnormal heart rhythms, or stroke. This risk may be due to changes in the heart rate and blood pressure that occur during sleep. SLEEP APNEA DIAGNOSIS  The diagnosis of STEPHANIE is best made by a knowledgeable sleep medicine specialist who has an understanding of the individual's health issues. The diagnosis is usually based upon the person's medical history, physical examination, and testing, including:  ? A complaint of snoring and ineffective sleep  ? Neck size (greater than 16 inches in men or 14 inches in women) is associated with an increased risk of sleep apnea  ? A small upper airway: difficulty seeing the throat because of a tongue that is large for the mouth  ? High blood pressure, especially if it is resistant to treatment  ? If a bed partner has observed the patient during episodes of stopped breathing (apnea), choking, or gasping during sleep, there is a strong possibility of sleep apnea.     Testing is usually performed in a sleep laboratory. A full sleep study is called a polysomnogram. The polysomnogram measures the breathing effort and airflow, blood oxygen level, heart rate and rhythm, duration of the various stages of sleep, body position, and movement of the arms/legs. Home monitoring devices are available that can perform a sleep study. This is a reasonable alternative to conventional testing in a sleep laboratory if the clinician strongly suspects moderate or severe sleep apnea and the patient does not have other illnesses or sleep disorders that may interfere with the results. SLEEP APNEA TREATMENT  Sleep apnea is best treated by a knowledgeable sleep medicine specialist. The goal of treatment is to maintain an open airway during sleep. Effective treatment will eliminate the symptoms of sleep disturbance; long-term health consequences are also reduced. Most treatments require nightly use. The challenge for the clinician and the patient is to select an effective therapy that is appropriate for the patient's problem and that is acceptable for long term use. Auto-titrating CPAP delivers an amount of PAP that varies during the night. The variation is dependent on event detection software algorithms, which will increase the pressure gradually in response to flow changes until adequate patency is detected. After a period of sustained upper airway patency, the delivered level of pressure gradually decreases until the algorithm identifies recurrent upper airway obstruction, at which point the delivered pressure again increases. The result is that the delivered pressure varies throughout the night, in an effort to provide the lowest pressure that is necessary to maintain upper airway patency. Continuous positive airway pressure (CPAP)  The most effective treatment for sleep apnea uses air pressure from a mechanical device to keep the upper airway open during sleep.  A CPAP (continuous positive airway pressure)  device uses an air-tight attachment to the nose, typically a mask, connected to a tube and a blower which generates the pressure. Devices that fit comfortably into the nasal opening, rather than over the nose, are also available. CPAP should be used any time the person sleeps (day or night). The CPAP device is usually used for the first time in the sleep lab, where a technician can adjust the pressure and select the best equipment to keep the airway open. Alternatively, an auto device with a self-adjusting pressure feature, provided with proper education and training, can get treatment started without another sleep test. While the treatment may seem uncomfortable, noisy, or bulky at first, most people accept the treatment after experiencing better sleep. However, difficulty with mask comfort and nasal congestion prevent up to 50 percent of people from using the treatment on a regular basis. Continued follow up with a healthcare provider helps to ensure that the treatment is effective and comfortable. Information from the CPAP machine is often used by physicians, therapists, and insurers to track the success of treatment. CPAP can be delivered with different features to improve comfort and solve problems that may come up during treatment. Changes in treatment may be needed if symptoms do not improve or if the persons condition changes, such as a gain or loss of weight. Adjust sleep position  Adjusting sleep position (to stay off the back) may help improve sleep quality in people who have STEPHANIE when sleeping on the back. However, this is difficult to maintain throughout the night and is rarely an adequate solution. Weight loss  Weight loss may be helpful for obese or overweight patients. Weight loss may be accomplished with dietary changes, exercise, and/or surgical treatment.  However, it can be difficult to maintain weight loss; the five-year success of non-surgical weight loss is only 5 percent, meaning that 95 percent of people regain lost weight. Avoid alcohol and other sedatives  Alcohol can worsen sleepiness, potentially increasing the risk of accidents or injury. People with STEPHANIE are often counseled to drink little to no alcohol, even during the daytime. Similarly, people who take anti-anxiety medications or sedatives to sleep should speak with their healthcare provider about the safety of these medications. People with STEPHANIE must notify all healthcare providers, including surgeons, about their condition and the potential risks of being sedated. People with STEPHANIE who are given anesthesia and/or pain medications require special management and close monitoring to reduce the risk of a blocked airway. Dental devices  A dental device, called an oral appliance or mandibular advancement device, can reposition the jaw (mandible), bringing the tongue and soft palate forward as well. This may relieve obstruction in some people. This treatment is excellent for reducing snoring, although the effect on STEPHANIE is sometimes more limited. As a result, dental devices are best used for mild cases of STEPHANIE when relief of snoring is the main goal. Failure to tolerate and accept CPAP is another indication for dental devices. While dental devices are not as effective as CPAP for STEPHANIE, some patients prefer a dental device to CPAP. Side effects of dental devices are generally minor but may include changes to the bite with prolonged use. Surgical treatment  Surgery is an alternative therapy for patients who cannot tolerate or do not improve with nonsurgical treatments such as CPAP or oral devices. Surgery can also be used in combination with other nonsurgical treatments. Surgical procedures reshape structures in the upper airways or surgically reposition bone or soft tissue. Uvulopalatopharyngoplasty (UPPP) removes the uvula and excessive tissue in the throat, including the tonsils, if present.  Other procedures, such as maxillomandibular advancement (MMA), address both the upper and lower pharyngeal airway more globally. UPPP alone has limited success rates (less than 50 percent) and people can relapse (when STEPHANIE symptoms return after surgery). As a result, this surgery is only recommended in a minority of people and should be considered with caution. MMA may have a higher success rate, particularly in people with abnormal jaw (maxilla and mandible) anatomy, but it is the most complicated procedure. A newer surgical approach, nerve stimulation to protrude the tongue, has promising success rates in very selected people. Tracheostomy creates a permanent opening in the neck. It is reserved for people with severe disease in whom less drastic measures have failed or are inappropriate. Although it is always successful in eliminating obstructive sleep apnea, tracheostomy requires significant lifestyle changes and carries some serious risks (eg, infection, bleeding, blockage). All surgical treatments require discussions about the goals of treatment, the expected outcomes, and potential complications. Hypoglossal nerve stimulator- \"Inspire\" device    PAP treatment failure:  Possible causes of treatment failure include nonadherence or suboptimal adherence, weight gain, an inappropriate level of prescribed positive pressure, or an additional disorder causing sleepiness (eg, narcolepsy) that may require alterations in the therapeutic regimen. A review of medications should also be undertaken since many drugs may lead to sleepiness. Inadequate sleep time may also negate the expected effects from treatment of STEPHANIE. Also, pt's can have persistent hypersomnolence associated with sleep apnea even in the presence of adequate therapy and at those times Provigil or Nuvigil or other stimulants may be indicated.     Once the patient's positive airway pressure therapy has been optimized and symptoms resolved, a regimen of long-term follow-up should be established. Annual visits are reasonable, with more frequent visits in between if new issues arise. The purpose of long-term follow-up is to assess usage and monitor for recurrent STEPHANIE, new side effects, air leakage, and fluctuations in body weight. WHERE TO GET MORE INFORMATION  Your healthcare provider is the best source of information for questions and concerns related to your medical problem. Organizations  American Sleep Apnea Association  Provides information about sleep apnea to the public, publishes a newsletter, and serves as an advocate for people with the disorder. Mary, [address]   Mirta@[employer]. org   [employer].. org   Tel: [phone]   Fax: [facility] that works to [employer] and safety by promoting public understanding of sleep and sleep disorders. Supports sleep-related education, research, and advocacy; produces and distributes educational materials to the public and healthcare professionals; and offers postdoctoral fellowships and grants for sleep researchers. [address]   [place]@[employer]. org   [employer]. org   Tel: [phone]   Fax: [phone]    Important information:  Medicare/private insurance CPAP/BiPAP/APAP requirements:  Medicare/private insurance has specific requirements for PAP compliance that must be met during the first 90 days of use to continue coverage for CPAP/BiPAP/APAP  from day 91 and beyond. The policy requires that patients use a PAP device 4 hours per 24 hour period, at least 70% of the time over a 30 day period. This data must be downloaded as a report direct from the PAP devices. This is called a compliance download. Your PAP supplier will assist you in this matter.      Note:  Where applicable, we will utilize PAP device efficiency reports, additional testing, and face-to-face  clinical evaluation subsequent to any treatment, changes in treatment, and continued treatment. Caution:  Please abstain from driving or engaging in other activities which may be hazardous in the presence of diminished alertness or daytime drowsiness. And avoid the use of sedatives or alcohol, which can worsen sleep apnea and daytime drowsiness. Mask suggestions:  -     Resmed Airfit N20 (Nasal) or F20 (Full face mask). They conform to your face, thus decreasing the potential for mask leakage. You might like the AirTouch F20(full face mask). It has a \"memory foam\" like cushion. The AirFit F30 is a smaller style full face mask designed to sit low on and cover less of your face for fewer facial marks. AirFit N30i has a top of the head tube with a nasal mask. AirFit P10 reported to be the most comfortable nasal pillow mask. Resmed Mirage FX reported to be the most comfortable nasal mask. Resmed Mirage [place] reported to be the most comfortable hybrid mask. AirTouch N20-memory foam nasal mask. Respironics: You might also like to try a nasal mask called a Dreamwear nasal mask or the Dreamwear nasal pillow. Another suggestion is the [employer], it is a minimal contact full face mask. The Ning Mayda incredible under the nose design makes it the only full face mask that won't cause red marks on the bridge of your nose when compared to other full face masks. The Dreamwear full face mask has a  soft feel, unique in-frame air-flow, and innovative air tube connection at the top of the head for the ultimate in sleep comfort. Comfort Gel Blue. Dreamwear gel pillows. Brian & Cassie: Brevida nasal pillow mask and Simplus FFM    The use of a memory foam CPAP pillow supports the head and neck throughout the night.

## 2021-10-01 NOTE — PROGRESS NOTES
REVIEW OF SYSTEMS    Constitutional: []Fever []Sweats []Chills [] Recent Injury   [x] Denies all unless marked  HENT:[]Headache  [] Head Injury  [] Sore Throat  [] Ear Pain  [] Dizziness [] Hearing Loss   [x] Denies all unless marked  Musculoskeletal: [] Arthralgia  [] Myalgias [] Muscle cramps  [] Muscle twitches   [x] Denies all unless marked   Spine:  [] Neck pain  [] Back pain  [] Sciaticia  [x] Denies all unless marked  Neurological:[] Visual Disturbance [] Double Vision [] Slurred Speech [] Trouble swallowing  [] Vertigo [] Tingling [] Numbness [] Weakness [] Loss of Balance   [] Loss of Consciousness [] Memory Loss [] Seizures  [x] Denies all unless marked  Psychiatric/Behavioral:[] Depression [] Anxiety  [x] Denies all unless marked  Sleep: [x]  Insomnia [x] Sleep Disturbance [x] Snoring [] Restless Legs [x] Daytime Sleepiness [] Sleep Apnea  [] Denies all unless marked
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
## 2021-10-01 NOTE — LETTER
[facility] Neurology and Sleep Medicine  [address] [place] [address] [address], [address] [place] [address]  Phone [phone]  Fax [phone]             Re:  Jonathon Vivar    10/01/21  :  1950  Address: [address] [place] [address]       Replinishible PAP Supplies, 1 year supply  Item HPCPS Code Frequency   Mask of choice  or  1 per 3 months   Nasal Mask cushion/pillows  or  2 per 30 days   Full Face Mask Interface  1 per 30 days   Headgear  1 per 6 months   Tubing, length of choice  or  1 per 3 months   Water Chamber  1 per 6 months   Chinstrap  1 per 6 months   Disposable Filters  2 per 30 days   Reusable Filters  1 per 6 months     Diagnoses:  Obstructive sleep apnea (G47.33)  Length of Need: Lifetime, 99    Ordering Provider: Sapna Meza PA-C  NPI:  [id number]    Note: Please provide her with choices of a nasal mask/under the nose mask/pillows and would need a chin strap.     Signature: [unfilled]        Date: 10/1/2021      Electronically Signed by Sapna Meza PA-C  on 10/1/2021 at 9:49 AM

## 2021-10-12 ENCOUNTER — HOSPITAL ENCOUNTER (OUTPATIENT)
Dept: WOMENS IMAGING | Age: 71
Discharge: HOME | End: 2021-10-12
Payer: MEDICARE

## 2021-10-12 DIAGNOSIS — Z12.31 ENCOUNTER FOR SCREENING MAMMOGRAM FOR MALIGNANT NEOPLASM OF BREAST: ICD-10-CM

## 2021-10-12 PROCEDURE — 77067 SCR MAMMO BI INCL CAD: CPT

## 2021-11-06 ENCOUNTER — APPOINTMENT (OUTPATIENT)
Dept: GENERAL RADIOLOGY | Age: 71
End: 2021-11-06
Payer: COMMERCIAL

## 2021-11-06 ENCOUNTER — HOSPITAL ENCOUNTER (EMERGENCY)
Age: 71
Discharge: HOME | End: 2021-11-06
Payer: COMMERCIAL

## 2021-11-06 VITALS
DIASTOLIC BLOOD PRESSURE: 63 MMHG | BODY MASS INDEX: 31.8 KG/M2 | OXYGEN SATURATION: 98 % | HEIGHT: 60 IN | RESPIRATION RATE: 18 BRPM | HEART RATE: 78 BPM | WEIGHT: 162 LBS | SYSTOLIC BLOOD PRESSURE: 137 MMHG | TEMPERATURE: 98.4 F

## 2021-11-06 DIAGNOSIS — M25.571 ACUTE RIGHT ANKLE PAIN: Primary | ICD-10-CM
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
## 2021-11-06 PROCEDURE — 73610 X-RAY EXAM OF ANKLE: CPT

## 2021-11-06 PROCEDURE — 99284 EMERGENCY DEPT VISIT MOD MDM: CPT

## 2021-11-06 ASSESSMENT — ENCOUNTER SYMPTOMS
VOMITING: 0
SHORTNESS OF BREATH: 0
COUGH: 0
ABDOMINAL PAIN: 0
DIARRHEA: 0
NAUSEA: 0
BACK PAIN: 0

## 2021-11-06 ASSESSMENT — PAIN SCALES - GENERAL: PAINLEVEL_OUTOF10: 7

## 2021-11-06 NOTE — ED NOTES
Bed: 14  Expected date:   Expected time:   Means of arrival:   Comments:  NATALIIA Cox  11/06/21 8963
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
## 2021-11-15 ENCOUNTER — OFFICE VISIT (OUTPATIENT)
Dept: VASCULAR SURGERY | Age: 71
End: 2021-11-15
Payer: MEDICARE

## 2021-11-15 ENCOUNTER — TRANSCRIBE ORDERS (OUTPATIENT)
Dept: ADMINISTRATIVE | Facility: HOSPITAL | Age: 71
End: 2021-11-15

## 2021-11-15 ENCOUNTER — HOSPITAL ENCOUNTER (OUTPATIENT)
Dept: ULTRASOUND IMAGING | Facility: HOSPITAL | Age: 71
Discharge: HOME | End: 2021-11-15
Admitting: PHYSICIAN ASSISTANT

## 2021-11-15 VITALS
HEART RATE: 67 BPM | SYSTOLIC BLOOD PRESSURE: 118 MMHG | HEIGHT: 61 IN | RESPIRATION RATE: 20 BRPM | BODY MASS INDEX: 30.58 KG/M2 | WEIGHT: 162 LBS | OXYGEN SATURATION: 95 % | TEMPERATURE: 96.2 F | DIASTOLIC BLOOD PRESSURE: 69 MMHG

## 2021-11-15 DIAGNOSIS — Z86.718 HISTORY OF DEEP VENOUS THROMBOSIS: ICD-10-CM

## 2021-11-15 DIAGNOSIS — M79.89 LEG SWELLING: ICD-10-CM

## 2021-11-15 DIAGNOSIS — M79.604 PAIN OF RIGHT LOWER EXTREMITY: ICD-10-CM

## 2021-11-15 DIAGNOSIS — M79.89 LEG SWELLING: Primary | ICD-10-CM

## 2021-11-15 DIAGNOSIS — Z86.718 HX OF DEEP VENOUS THROMBOSIS: Primary | ICD-10-CM

## 2021-11-15 PROCEDURE — 93971 EXTREMITY STUDY: CPT

## 2021-11-15 PROCEDURE — 1090F PRES/ABSN URINE INCON ASSESS: CPT | Performed by: PHYSICIAN ASSISTANT

## 2021-11-15 PROCEDURE — 1036F TOBACCO NON-USER: CPT | Performed by: PHYSICIAN ASSISTANT

## 2021-11-15 PROCEDURE — 99212 OFFICE O/P EST SF 10 MIN: CPT | Performed by: PHYSICIAN ASSISTANT

## 2021-11-15 PROCEDURE — 3017F COLORECTAL CA SCREEN DOC REV: CPT | Performed by: PHYSICIAN ASSISTANT

## 2021-11-15 PROCEDURE — G8417 CALC BMI ABV UP PARAM F/U: HCPCS | Performed by: PHYSICIAN ASSISTANT

## 2021-11-15 PROCEDURE — 1123F ACP DISCUSS/DSCN MKR DOCD: CPT | Performed by: PHYSICIAN ASSISTANT

## 2021-11-15 PROCEDURE — G8399 PT W/DXA RESULTS DOCUMENT: HCPCS | Performed by: PHYSICIAN ASSISTANT

## 2021-11-15 PROCEDURE — G8428 CUR MEDS NOT DOCUMENT: HCPCS | Performed by: PHYSICIAN ASSISTANT

## 2021-11-15 PROCEDURE — G8484 FLU IMMUNIZE NO ADMIN: HCPCS | Performed by: PHYSICIAN ASSISTANT

## 2021-11-15 PROCEDURE — 4040F PNEUMOC VAC/ADMIN/RCVD: CPT | Performed by: PHYSICIAN ASSISTANT

## 2021-11-15 RX ORDER — DOXYCYCLINE HYCLATE 100 MG/1
CAPSULE ORAL
COMMUNITY
Start: 2021-08-12 | End: 2022-01-24

## 2021-11-15 RX ORDER — BUDESONIDE AND FORMOTEROL FUMARATE DIHYDRATE 160; 4.5 UG/1; UG/1
2 AEROSOL RESPIRATORY (INHALATION) DAILY
COMMUNITY
Start: 2021-10-18

## 2021-11-15 RX ORDER — DEXTROMETHORPHAN HYDROBROMIDE AND PROMETHAZINE HYDROCHLORIDE 15; 6.25 MG/5ML; MG/5ML
SYRUP ORAL
COMMUNITY
Start: 2021-10-18 | End: 2022-01-24

## 2021-11-15 RX ORDER — RIVAROXABAN 20 MG/1
TABLET, FILM COATED ORAL
COMMUNITY
Start: 2021-10-19 | End: 2022-01-24

## 2021-11-15 RX ORDER — AZITHROMYCIN 250 MG/1
TABLET, FILM COATED ORAL
COMMUNITY
Start: 2021-10-18 | End: 2022-01-24

## 2021-11-15 RX ORDER — METOPROLOL SUCCINATE 25 MG/1
25 TABLET, EXTENDED RELEASE ORAL DAILY
COMMUNITY
End: 2022-01-24
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
## 2021-12-01 ENCOUNTER — OFFICE VISIT (OUTPATIENT)
Dept: NEUROLOGY | Age: 71
End: 2021-12-01
Payer: MEDICARE

## 2021-12-01 VITALS
DIASTOLIC BLOOD PRESSURE: 68 MMHG | SYSTOLIC BLOOD PRESSURE: 112 MMHG | OXYGEN SATURATION: 98 % | HEART RATE: 76 BPM | HEIGHT: 61 IN | BODY MASS INDEX: 30.58 KG/M2 | WEIGHT: 162 LBS

## 2021-12-01 DIAGNOSIS — Z78.9 DIFFICULTY USING CONTINUOUS POSITIVE AIRWAY PRESSURE (CPAP) DEVICE: ICD-10-CM

## 2021-12-01 DIAGNOSIS — G47.33 OBSTRUCTIVE SLEEP APNEA: Primary | ICD-10-CM

## 2021-12-01 PROCEDURE — G8427 DOCREV CUR MEDS BY ELIG CLIN: HCPCS | Performed by: PHYSICIAN ASSISTANT

## 2021-12-01 PROCEDURE — 1123F ACP DISCUSS/DSCN MKR DOCD: CPT | Performed by: PHYSICIAN ASSISTANT

## 2021-12-01 PROCEDURE — G8399 PT W/DXA RESULTS DOCUMENT: HCPCS | Performed by: PHYSICIAN ASSISTANT

## 2021-12-01 PROCEDURE — G8417 CALC BMI ABV UP PARAM F/U: HCPCS | Performed by: PHYSICIAN ASSISTANT

## 2021-12-01 PROCEDURE — G8484 FLU IMMUNIZE NO ADMIN: HCPCS | Performed by: PHYSICIAN ASSISTANT

## 2021-12-01 PROCEDURE — 4040F PNEUMOC VAC/ADMIN/RCVD: CPT | Performed by: PHYSICIAN ASSISTANT

## 2021-12-01 PROCEDURE — 99213 OFFICE O/P EST LOW 20 MIN: CPT | Performed by: PHYSICIAN ASSISTANT

## 2021-12-01 PROCEDURE — 1036F TOBACCO NON-USER: CPT | Performed by: PHYSICIAN ASSISTANT

## 2021-12-01 PROCEDURE — 1090F PRES/ABSN URINE INCON ASSESS: CPT | Performed by: PHYSICIAN ASSISTANT

## 2021-12-01 PROCEDURE — 3017F COLORECTAL CA SCREEN DOC REV: CPT | Performed by: PHYSICIAN ASSISTANT
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
## 2021-12-01 NOTE — PATIENT INSTRUCTIONS
Patient education: Sleep apnea in adults       INTRODUCTION  Normally during sleep, air moves through the throat and in and out of the lungs at a regular rhythm. In a person with sleep apnea, air movement is periodically diminished or stopped. There are two types of sleep apnea: obstructive sleep apnea and central sleep apnea. In obstructive sleep apnea, breathing is abnormal because of narrowing or closure of the throat. In central sleep apnea, breathing is abnormal because of a change in the breathing control and rhythm. Sleep apnea is a serious condition that can affect a person's ability to safely perform normal daily activities and can affect long term health. Approximately 25 percent of adults are at risk for sleep apnea of some degree. Men are more commonly affected than women. Other risk factors include middle and older age, being overweight or obese, and having a small mouth and throat. This topic review focuses on the most common type of sleep apnea in adults, obstructive sleep apnea (STEPHANIE). HOW SLEEP APNEA OCCURS  The throat is surrounded by muscles that control the airway for speaking, swallowing, and breathing. During sleep, these muscles are less active, and this causes the throat to narrow. In most people, this narrowing does not affect breathing. In others, it can cause snoring, sometimes with reduced or completely blocked airflow. A completely blocked airway without airflow is called an obstructive apnea. Partial obstruction with diminished airflow is called a hypopnea. A person may have apnea and hypopnea during sleep. Insufficient breathing due to apnea or hypopnea causes oxygen levels to fall and carbon dioxide to rise. Because the airway is blocked, breathing faster or harder does not help to improve oxygen levels until the airway is reopened. Typically, the obstruction requires the person to awaken to activate the upper airway muscles.  Once the airway is opened, the person then takes several deep breaths to catch up on breathing. As the person awakens, he or she may move briefly, snort or snore, and take a deep breath. Less frequently, a person may awaken completely with a sensation of gasping, smothering, or choking. If the person falls back to sleep quickly, he or she will not remember the event. Many people with sleep apnea are unaware of their abnormal breathing in sleep, and all patients underestimate how often their sleep is interrupted. Awakening from sleep causes sleep to be unrefreshing and causes fatigue and daytime sleepiness. Anatomic causes of obstructive sleep apnea   Most patients have STEPHANIE because of a small upper airway. As the bones of the face and skull develop, some people develop a small lower face, a small mouth, and a tongue that seems too large for the mouth. These features are genetically determined, which explains why STEPHANIE tends to cluster in families. Obesity is another major factor. Tonsil enlargement can be an important cause, especially in children. SLEEP APNEA SYMPTOMS  The main symptoms of STEPHANIE are loud snoring, fatigue, and daytime sleepiness. However, some people have no symptoms. For example, if the person does not have a bed partner, he or she may not be aware of the snoring. Fatigue and sleepiness have many causes and are often attributed to overwork and increasing age. As a result, a person may be slow to recognize that they have a problem. A bed partner or spouse often prompts the patient to seek medical care. Other symptoms may include one or more of the following:  ?Restless sleep  ? Awakening with choking, gasping, or smothering  ? Morning headaches, dry mouth, or sore throat  ? Waking frequently to urinate  ? Awakening unrested, groggy  ? Low energy, difficulty concentrating, memory impairment    Risk factors  Certain factors increase the risk of sleep apnea.   ?Increasing age [de-identified] STEPHANIE occurs at all ages, but it is more common in middle and older age adults. ?Male sex  STEPHANIE is two times more common in men, especially in middle age. ?Obesity  The more obese a person is, the more likely he or she is to have STEPHANIE. ? Sedation from medication or alcohol  This interferes with the ability to awaken from sleep and can lengthen periods of apnea (no breathing), with potentially dangerous consequences. ? Abnormality of the airway. SLEEP APNEA CONSEQUENCES  Complications of sleep apnea can include daytime sleepiness and difficulty concentrating. The consequence of this is an increased risk of accidents and errors in daily activities. Studies have shown that people with severe STEPHANIE are more than twice as likely to be involved in a motor vehicle accident as people without these conditions. People with STEPHANIE are encouraged to discuss options for driving, working, and performing other high-risk tasks with a healthcare provider. In addition, people with untreated STEPHANIE may have an increased risk of cardiovascular problems such as high blood pressure, heart attack, abnormal heart rhythms, or stroke. This risk may be due to changes in the heart rate and blood pressure that occur during sleep. SLEEP APNEA DIAGNOSIS  The diagnosis of STEPHANIE is best made by a knowledgeable sleep medicine specialist who has an understanding of the individual's health issues. The diagnosis is usually based upon the person's medical history, physical examination, and testing, including:  ? A complaint of snoring and ineffective sleep  ? Neck size (greater than 16 inches in men or 14 inches in women) is associated with an increased risk of sleep apnea  ? A small upper airway: difficulty seeing the throat because of a tongue that is large for the mouth  ? High blood pressure, especially if it is resistant to treatment  ? If a bed partner has observed the patient during episodes of stopped breathing (apnea), choking, or gasping during sleep, there is a strong possibility of sleep apnea.     Testing is usually performed in a sleep laboratory. A full sleep study is called a polysomnogram. The polysomnogram measures the breathing effort and airflow, blood oxygen level, heart rate and rhythm, duration of the various stages of sleep, body position, and movement of the arms/legs. Home monitoring devices are available that can perform a sleep study. This is a reasonable alternative to conventional testing in a sleep laboratory if the clinician strongly suspects moderate or severe sleep apnea and the patient does not have other illnesses or sleep disorders that may interfere with the results. SLEEP APNEA TREATMENT  Sleep apnea is best treated by a knowledgeable sleep medicine specialist. The goal of treatment is to maintain an open airway during sleep. Effective treatment will eliminate the symptoms of sleep disturbance; long-term health consequences are also reduced. Most treatments require nightly use. The challenge for the clinician and the patient is to select an effective therapy that is appropriate for the patient's problem and that is acceptable for long term use. Auto-titrating CPAP delivers an amount of PAP that varies during the night. The variation is dependent on event detection software algorithms, which will increase the pressure gradually in response to flow changes until adequate patency is detected. After a period of sustained upper airway patency, the delivered level of pressure gradually decreases until the algorithm identifies recurrent upper airway obstruction, at which point the delivered pressure again increases. The result is that the delivered pressure varies throughout the night, in an effort to provide the lowest pressure that is necessary to maintain upper airway patency. Continuous positive airway pressure (CPAP)  The most effective treatment for sleep apnea uses air pressure from a mechanical device to keep the upper airway open during sleep.  A CPAP (continuous positive airway pressure)  device uses an air-tight attachment to the nose, typically a mask, connected to a tube and a blower which generates the pressure. Devices that fit comfortably into the nasal opening, rather than over the nose, are also available. CPAP should be used any time the person sleeps (day or night). The CPAP device is usually used for the first time in the sleep lab, where a technician can adjust the pressure and select the best equipment to keep the airway open. Alternatively, an auto device with a self-adjusting pressure feature, provided with proper education and training, can get treatment started without another sleep test. While the treatment may seem uncomfortable, noisy, or bulky at first, most people accept the treatment after experiencing better sleep. However, difficulty with mask comfort and nasal congestion prevent up to 50 percent of people from using the treatment on a regular basis. Continued follow up with a healthcare provider helps to ensure that the treatment is effective and comfortable. Information from the CPAP machine is often used by physicians, therapists, and insurers to track the success of treatment. CPAP can be delivered with different features to improve comfort and solve problems that may come up during treatment. Changes in treatment may be needed if symptoms do not improve or if the persons condition changes, such as a gain or loss of weight. Adjust sleep position  Adjusting sleep position (to stay off the back) may help improve sleep quality in people who have STEPHANIE when sleeping on the back. However, this is difficult to maintain throughout the night and is rarely an adequate solution. Weight loss  Weight loss may be helpful for obese or overweight patients. Weight loss may be accomplished with dietary changes, exercise, and/or surgical treatment.  However, it can be difficult to maintain weight loss; the five-year success of non-surgical weight loss is only 5 percent, meaning that 95 percent of people regain lost weight. Avoid alcohol and other sedatives  Alcohol can worsen sleepiness, potentially increasing the risk of accidents or injury. People with STEPHANIE are often counseled to drink little to no alcohol, even during the daytime. Similarly, people who take anti-anxiety medications or sedatives to sleep should speak with their healthcare provider about the safety of these medications. People with STEPHANIE must notify all healthcare providers, including surgeons, about their condition and the potential risks of being sedated. People with STEPHANIE who are given anesthesia and/or pain medications require special management and close monitoring to reduce the risk of a blocked airway. Dental devices  A dental device, called an oral appliance or mandibular advancement device, can reposition the jaw (mandible), bringing the tongue and soft palate forward as well. This may relieve obstruction in some people. This treatment is excellent for reducing snoring, although the effect on STEPHANIE is sometimes more limited. As a result, dental devices are best used for mild cases of STEPHANIE when relief of snoring is the main goal. Failure to tolerate and accept CPAP is another indication for dental devices. While dental devices are not as effective as CPAP for STEPHANIE, some patients prefer a dental device to CPAP. Side effects of dental devices are generally minor but may include changes to the bite with prolonged use. Surgical treatment  Surgery is an alternative therapy for patients who cannot tolerate or do not improve with nonsurgical treatments such as CPAP or oral devices. Surgery can also be used in combination with other nonsurgical treatments. Surgical procedures reshape structures in the upper airways or surgically reposition bone or soft tissue. Uvulopalatopharyngoplasty (UPPP) removes the uvula and excessive tissue in the throat, including the tonsils, if present.  Other procedures, such as maxillomandibular advancement (MMA), address both the upper and lower pharyngeal airway more globally. UPPP alone has limited success rates (less than 50 percent) and people can relapse (when STEPHANIE symptoms return after surgery). As a result, this surgery is only recommended in a minority of people and should be considered with caution. MMA may have a higher success rate, particularly in people with abnormal jaw (maxilla and mandible) anatomy, but it is the most complicated procedure. A newer surgical approach, nerve stimulation to protrude the tongue, has promising success rates in very selected people. Tracheostomy creates a permanent opening in the neck. It is reserved for people with severe disease in whom less drastic measures have failed or are inappropriate. Although it is always successful in eliminating obstructive sleep apnea, tracheostomy requires significant lifestyle changes and carries some serious risks (eg, infection, bleeding, blockage). All surgical treatments require discussions about the goals of treatment, the expected outcomes, and potential complications. Hypoglossal nerve stimulator- \"Inspire\" device    PAP treatment failure:  Possible causes of treatment failure include nonadherence or suboptimal adherence, weight gain, an inappropriate level of prescribed positive pressure, or an additional disorder causing sleepiness (eg, narcolepsy) that may require alterations in the therapeutic regimen. A review of medications should also be undertaken since many drugs may lead to sleepiness. Inadequate sleep time may also negate the expected effects from treatment of STEPHANIE. Also, pt's can have persistent hypersomnolence associated with sleep apnea even in the presence of adequate therapy and at those times Provigil or Nuvigil or other stimulants may be indicated.     Once the patient's positive airway pressure therapy has been optimized and symptoms resolved, a regimen of long-term follow-up should be established. Annual visits are reasonable, with more frequent visits in between if new issues arise. The purpose of long-term follow-up is to assess usage and monitor for recurrent STEPHANIE, new side effects, air leakage, and fluctuations in body weight. WHERE TO GET MORE INFORMATION  Your healthcare provider is the best source of information for questions and concerns related to your medical problem. Organizations  [employer]  Provides information about sleep apnea to the public, publishes a newsletter, and serves as an advocate for people with the disorder. Mary, [address]   Ashlie@[employer]. org   [employer].. org   Tel: [phone]   Fax: [facility] that works to [employer] and safety by promoting public understanding of sleep and sleep disorders. Supports sleep-related education, research, and advocacy; produces and distributes educational materials to the public and healthcare professionals; and offers postdoctoral fellowships and grants for sleep researchers. [address]   Esau@[employer]. org   [email or web address]. org   Tel: [phone]   Fax: [phone]    Important information:  Medicare/private insurance CPAP/BiPAP/APAP requirements:  Medicare/private insurance has specific requirements for PAP compliance that must be met during the first 90 days of use to continue coverage for CPAP/BiPAP/APAP  from day 91 and beyond. The policy requires that patients use a PAP device 4 hours per 24 hour period, at least 70% of the time over a 30 day period. This data must be downloaded as a report direct from the PAP devices. This is called a compliance download. Your PAP supplier will assist you in this matter.      Note:  Where applicable, we will utilize PAP device efficiency reports, additional testing, and face-to-face  clinical evaluation subsequent to any treatment, changes in treatment, and continued treatment. Caution:  Please abstain from driving or engaging in other activities which may be hazardous in the presence of diminished alertness or daytime drowsiness. And avoid the use of sedatives or alcohol, which can worsen sleep apnea and daytime drowsiness. Mask suggestions:  -     Resmed Airfit N20 (Nasal) or F20 (Full face mask). They conform to your face, thus decreasing the potential for mask leakage. You might like the AirTouch F20(full face mask). It has a \"memory foam\" like cushion. The AirFit F30 is a smaller style full face mask designed to sit low on and cover less of your face for fewer facial marks. AirFit N30i has a top of the head tube with a nasal mask. AirFit P10 reported to be the most comfortable nasal pillow mask. Resmed Mirage FX reported to be the most comfortable nasal mask. Resmed Mirage Williams reported to be the most comfortable hybrid mask. AirTouch N20-memory foam nasal mask. Respironics: You might also like to try a nasal mask called a Dreamwear nasal mask or the Dreamwear nasal pillow. Another suggestion is the [facility], it is a minimal contact full face mask. The Jackie Haydee incredible under the nose design makes it the only full face mask that won't cause red marks on the bridge of your nose when compared to other full face masks. The Dreamwear full face mask has a  soft feel, unique in-frame air-flow, and innovative air tube connection at the top of the head for the ultimate in sleep comfort. Comfort Gel Blue. Dreamwear gel pillows. Brian & Cassie: Brevida nasal pillow mask and Simplus FFM    The use of a memory foam CPAP pillow supports the head and neck throughout the night.

## 2021-12-07 ENCOUNTER — HOSPITAL ENCOUNTER (OUTPATIENT)
Dept: CT IMAGING | Age: 71
Discharge: HOME | End: 2021-12-07
Payer: COMMERCIAL

## 2021-12-07 DIAGNOSIS — M25.571 PAIN IN JOINT INVOLVING RIGHT ANKLE AND FOOT: ICD-10-CM

## 2021-12-07 PROCEDURE — 73700 CT LOWER EXTREMITY W/O DYE: CPT

## 2022-01-04 ENCOUNTER — HOSPITAL ENCOUNTER (OUTPATIENT)
Dept: GENERAL RADIOLOGY | Age: 72
Discharge: HOME | End: 2022-01-04
Payer: MEDICARE

## 2022-01-04 ENCOUNTER — HOSPITAL ENCOUNTER (OUTPATIENT)
Dept: LAB | Age: 72
Discharge: HOME | End: 2022-01-04
Payer: MEDICARE

## 2022-01-04 DIAGNOSIS — J45.40 MODERATE PERSISTENT ASTHMA WITHOUT COMPLICATION: ICD-10-CM

## 2022-01-04 LAB
BASOPHILS ABSOLUTE: 0.1 K/UL (ref 0–0.2)
BASOPHILS RELATIVE PERCENT: 1.2 % (ref 0–1)
EOSINOPHILS ABSOLUTE: 0.7 K/UL (ref 0–0.6)
EOSINOPHILS RELATIVE PERCENT: 7.9 % (ref 0–5)
HCT VFR BLD CALC: 39 % (ref 37–47)
HEMOGLOBIN: 12.2 G/DL (ref 12–16)
IMMATURE GRANULOCYTES #: 0 K/UL
LYMPHOCYTES ABSOLUTE: 2.5 K/UL (ref 1.1–4.5)
LYMPHOCYTES RELATIVE PERCENT: 26.7 % (ref 20–40)
MCH RBC QN AUTO: 31.6 PG (ref 27–31)
MCHC RBC AUTO-ENTMCNC: 31.3 G/DL (ref 33–37)
MCV RBC AUTO: 101 FL (ref 81–99)
MONOCYTES ABSOLUTE: 0.6 K/UL (ref 0–0.9)
MONOCYTES RELATIVE PERCENT: 6.2 % (ref 0–10)
NEUTROPHILS ABSOLUTE: 5.4 K/UL (ref 1.5–7.5)
NEUTROPHILS RELATIVE PERCENT: 57.8 % (ref 50–65)
PDW BLD-RTO: 12.7 % (ref 11.5–14.5)
PLATELET # BLD: 373 K/UL (ref 130–400)
PMV BLD AUTO: 9.4 FL (ref 9.4–12.3)
RBC # BLD: 3.86 M/UL (ref 4.2–5.4)
SEDIMENTATION RATE, ERYTHROCYTE: 13 MM/HR (ref 0–25)
WBC # BLD: 9.4 K/UL (ref 4.8–10.8)

## 2022-01-04 PROCEDURE — 36415 COLL VENOUS BLD VENIPUNCTURE: CPT

## 2022-01-04 PROCEDURE — 86160 COMPLEMENT ANTIGEN: CPT

## 2022-01-04 PROCEDURE — 85025 COMPLETE CBC W/AUTO DIFF WBC: CPT

## 2022-01-04 PROCEDURE — 82785 ASSAY OF IGE: CPT

## 2022-01-04 PROCEDURE — 71046 X-RAY EXAM CHEST 2 VIEWS: CPT

## 2022-01-04 PROCEDURE — 85652 RBC SED RATE AUTOMATED: CPT

## 2022-01-08 LAB — C4 COMPLEMENT: 20 MG/DL (ref 10–40)

## 2022-01-10 LAB — IGE: 67 KU/L

## 2022-01-24 ENCOUNTER — OFFICE VISIT (OUTPATIENT)
Dept: CARDIOLOGY CLINIC | Age: 72
End: 2022-01-24
Payer: MEDICARE

## 2022-01-24 VITALS
BODY MASS INDEX: 30.21 KG/M2 | HEIGHT: 61 IN | HEART RATE: 76 BPM | OXYGEN SATURATION: 98 % | DIASTOLIC BLOOD PRESSURE: 72 MMHG | SYSTOLIC BLOOD PRESSURE: 106 MMHG | WEIGHT: 160 LBS

## 2022-01-24 DIAGNOSIS — I10 ESSENTIAL HYPERTENSION: ICD-10-CM

## 2022-01-24 DIAGNOSIS — I25.10 CORONARY ARTERY DISEASE INVOLVING NATIVE CORONARY ARTERY OF NATIVE HEART WITHOUT ANGINA PECTORIS: Primary | ICD-10-CM

## 2022-01-24 DIAGNOSIS — M79.10 MYALGIA DUE TO STATIN: ICD-10-CM

## 2022-01-24 DIAGNOSIS — T46.6X5A MYALGIA DUE TO STATIN: ICD-10-CM

## 2022-01-24 DIAGNOSIS — E78.2 MIXED HYPERLIPIDEMIA: ICD-10-CM

## 2022-01-24 DIAGNOSIS — R00.1 BRADYCARDIA: ICD-10-CM

## 2022-01-24 PROCEDURE — G8427 DOCREV CUR MEDS BY ELIG CLIN: HCPCS | Performed by: CLINICAL NURSE SPECIALIST

## 2022-01-24 PROCEDURE — G8399 PT W/DXA RESULTS DOCUMENT: HCPCS | Performed by: CLINICAL NURSE SPECIALIST

## 2022-01-24 PROCEDURE — 1036F TOBACCO NON-USER: CPT | Performed by: CLINICAL NURSE SPECIALIST

## 2022-01-24 PROCEDURE — 3017F COLORECTAL CA SCREEN DOC REV: CPT | Performed by: CLINICAL NURSE SPECIALIST

## 2022-01-24 PROCEDURE — 1123F ACP DISCUSS/DSCN MKR DOCD: CPT | Performed by: CLINICAL NURSE SPECIALIST

## 2022-01-24 PROCEDURE — 1090F PRES/ABSN URINE INCON ASSESS: CPT | Performed by: CLINICAL NURSE SPECIALIST

## 2022-01-24 PROCEDURE — G8484 FLU IMMUNIZE NO ADMIN: HCPCS | Performed by: CLINICAL NURSE SPECIALIST

## 2022-01-24 PROCEDURE — 99214 OFFICE O/P EST MOD 30 MIN: CPT | Performed by: CLINICAL NURSE SPECIALIST

## 2022-01-24 PROCEDURE — 4040F PNEUMOC VAC/ADMIN/RCVD: CPT | Performed by: CLINICAL NURSE SPECIALIST

## 2022-01-24 PROCEDURE — G8417 CALC BMI ABV UP PARAM F/U: HCPCS | Performed by: CLINICAL NURSE SPECIALIST

## 2022-01-24 RX ORDER — AMLODIPINE BESYLATE 5 MG/1
5 TABLET ORAL DAILY
Qty: 90 TABLET | Refills: 3 | Status: SHIPPED | OUTPATIENT
Start: 2022-01-24

## 2022-01-24 RX ORDER — FERROUS SULFATE 325(65) MG
325 TABLET ORAL
COMMUNITY
End: 2022-10-25

## 2022-01-24 RX ORDER — ASPIRIN 81 MG/1
81 TABLET ORAL DAILY
COMMUNITY

## 2022-01-24 ASSESSMENT — ENCOUNTER SYMPTOMS
ABDOMINAL PAIN: 0
CHEST TIGHTNESS: 0
NAUSEA: 0
FACIAL SWELLING: 0
WHEEZING: 0
SHORTNESS OF BREATH: 1
EYE REDNESS: 0
VOMITING: 0
COUGH: 0

## 2022-01-24 NOTE — PROGRESS NOTES
[facility], [address] [facility] [address] [facility] Janice Perez, [address] [address]  Phone: [phone]  Fax: [phone]    OFFICE VISIT:  1/24/2022    [address]: 1950    Reason For Visit:  Florence Silvestre is a 73 y.o. female who is here for Follow-up, Coronary Artery Disease, and Hypertension      HPI  Patient is here for follow-up for  Mild to moderate, nonocclusive CAD  with the  following cardiac history:  2/2/2014  lexiscan Positive for inferior apical myocardial ischemia, EF 62%, 2% ischemic myocardium on stress, low risk findings, AUC indication 15, AUC score 4  2/2/2014  Cath  Mild to moderate distal disease, normal LVFX  1/21/2016  lexiscan  Positive for inferor lateral MI + myocardial ischemia, EF 69%, 12/11% ischemic myocardium on stress, low risk findings, AUC indication 15, AUC score 4  1/22/16  Cath  Mild to moderate distal disease, normal LVFX  5/7/21- DSE negative for ischemia    At visit last year she was complaining of some chest and jaw discomfort. A dobutamine stress echo showed no evidence of ischemia. She feels the symptoms have resolved. She reports she is back to exercising at the gym and is tolerating well. She has no chest discomfort or unusual dyspnea with exercise. She has issues with statin intolerance and associated myalgias. She has stopped her pravastatin. PCP monitors labs.     She reports she has been diagnosed with asthma since her last visit here and is now using some inhalers which are helpful      Claudetta Lints, DO is PCP.   Lexus Bray has the following history as recorded in [facility]:    Patient Active Problem List    Diagnosis Date Noted    Obstructive sleep apnea 10/01/2021    S/P FESS (functional endoscopic sinus surgery) 11/19/2020    Deviated nasal septum 02/06/2020    Nasal turbinate hypertrophy 02/06/2020    Chronic pansinusitis 02/06/2020    Nasal obstruction 02/06/2020    Bradycardia 12/10/2019    Difficulty using ENDOSCOPY, REPAIR OF NASAL SEPTUM, PARTIAL ETHMOIDECTOMY, CAUTERY TURBINATE MUCOSA, INTRAMURAL, THERAPEUTIC FRACTURE INFERIOR TURBINATES, OPEN MAXILLARY SINUS performed by Lawrence Carson MD at [address] [address] TOTAL HIP ARTHROPLASTY  11/2014    TOTAL KNEE ARTHROPLASTY Bilateral     TOTAL KNEE ARTHROPLASTY Right 2002    Total knee replacement, right knee revision, Dr. Colt Wise 2002. Family History   Problem Relation Age of Onset    Kidney Disease Mother     High Blood Pressure Mother     Other Mother         AAA    Heart Disease Father     Kidney Disease Father     Brain Cancer Brother     Stroke Paternal Grandfather      Social History     Tobacco Use    Smoking status: Passive Smoke Exposure - Never Smoker    Smokeless tobacco: Never Used   Substance Use Topics    Alcohol use: No      Current Outpatient Medications   Medication Sig Dispense Refill    aspirin 81 MG EC tablet Take 81 mg by mouth daily      ferrous sulfate (IRON 325) 325 (65 Fe) MG tablet Take 325 mg by mouth daily (with breakfast)      amLODIPine (NORVASC) 5 MG tablet Take 1 tablet by mouth daily 90 tablet 3    SYMBICORT 160-4.5 MCG/ACT AERO Inhale 2 puffs into the lungs daily       cetirizine (ZYRTEC) 10 MG tablet Take 10 mg by mouth daily Patient takes morning and at bedtime      DULoxetine (CYMBALTA) 30 MG extended release capsule Take 30 mg by mouth daily Patient takes 3, 30 mg tablets daily      ARIPiprazole (ABILIFY) 2 MG tablet TAKE 1 TABLET BY MOUTH EVERY DAY      metoprolol tartrate (LOPRESSOR) 25 MG tablet Take 0.5 tablets by mouth daily (Patient taking differently: Take 50 mg by mouth daily ) 90 tablet 3    HYDROcodone-acetaminophen (NORCO)  MG per tablet Take 1 tablet by mouth every 6 hours as needed for Pain.       Cyanocobalamin (B-12 COMPLIANCE INJECTION IJ) Inject as directed      ondansetron (ZOFRAN ODT) 8 MG TBDP disintegrating tablet Place 1 tablet under the Objective  Vital Signs - /72   Pulse 76   Ht 5' 1\" (1.549 m)   Wt 160 lb (72.6 kg)   SpO2 98%   BMI 30.23 kg/m²   Physical Exam  Vitals and nursing note reviewed. Constitutional:       General: She is not in acute distress. Appearance: Normal appearance. She is well-developed. She is not ill-appearing or diaphoretic. HENT:      Head: Normocephalic and atraumatic. Eyes:      General:         Right eye: No discharge. Left eye: No discharge. Pupils: Pupils are equal, round, and reactive to light. Neck:      Vascular: No carotid bruit or JVD. Trachea: No tracheal deviation. Cardiovascular:      Rate and Rhythm: Normal rate and regular rhythm. Heart sounds: Normal heart sounds. No murmur heard. No friction rub. No gallop. Comments: No carotid bruit  Pulmonary:      Effort: Pulmonary effort is normal. No respiratory distress. Breath sounds: Normal breath sounds. No wheezing or rales. Abdominal:      Palpations: Abdomen is soft. Tenderness: There is no abdominal tenderness. Musculoskeletal:         General: No swelling. Cervical back: Neck supple. No muscular tenderness. Right lower leg: Edema (2+) present. Left lower leg: Edema (1+) present. Comments: Ambulates with cane, unsteady gait   Skin:     General: Skin is warm and dry. Findings: No rash. Neurological:      Mental Status: She is alert and oriented to person, place, and time. Cranial Nerves: No cranial nerve deficit. Psychiatric:         Behavior: Behavior normal.         Judgment: Judgment normal.         Data:  DSE 5/17/21  Conclusions      Summary   Dobutamine stress echocardiogram without clinical, electrocardiographic,   or echocardiographic evidence of myocardial ischemia.       Signature      ----------------------------------------------------------------   Electronically signed by Kenroy Diamond MD(Interpreting physician)   on 05/17/2021 05:30 PM ----------------------------------------------------------------  Echo 4/29/21  Conclusions      Summary   Normal left ventricular size and systolic function EF 18-59%   Mild concentric left ventricular hypertrophy with mild [grade 1] diastolic   dysfunction   Normal left atrial size   Tricuspid aortic valve with adequate cusp separation and neither stenosis   or insufficiency   Normally mobile mitral valve with trace regurgitation   Mild pulmonic insufficiency   Normal right-sided chambers with preserved RV systolic function   No tricuspid regurgitation with which to estimate RVSP   Normal IVC dimension with no inspiratory motion seen precluding exclusion   of a mildly elevated right atrial filling pressures   No significant pericardial effusion   Aortic root and ascending segments measured within normal limits      Signature      ----------------------------------------------------------------   Electronically signed by Bennie Larry MD(Interpreting physician)   on 04/29/2021 12:25 PM    Heart Cath 1/222/16  Summary:       1. Successful femoral artery ultrasound  3. Mild to moderate coronary artery disease  4. Left ventricular function is normal    EKG shows sinus bradycardia rate 45    Assessment:     Diagnosis Orders   1. Coronary artery disease involving native coronary artery of native heart without angina pectoris     2. Essential hypertension     3. Mixed hyperlipidemia     4. Bradycardia         CAD-mild to moderate per heart cath 2016. Negative DSE in 2021. Stable without complaints of angina. Continue low-dose metoprolol. Close monitoring of lipids with her statin intolerance    Hypertension-stable on current regimen with amlodipine, clonidine, metoprolol, irbesartan    Hypercholesterolemia/statin intolerance-has tried  Crestor,  Lipitor, and Zetia with myalgias. She has stopped pravastatin recently due to myalgias. Will request most recent lipids from PCP office.     Bradycardia-improved compared to last visit since decreasing dose of metoprolol. Heart rate stable at 76 today, previously 45    Plan    Return in about 1 year (around 1/24/2023) for MICHELLE. Call with any questionsor concerns  Follow up with Dc Yuen DO for non cardiac problems  Report any new problems  Cardiovascular Fitness-Exercise as tolerated. Strive for 15 minutes of exercise most days of the week. Cardiac / HealthyDiet  Continue current medications as directed  Continue plan of treatment  It is always recommended that you bring your medicationsbottles with you to each visit - this is for your safety!        MICHELLE Franco

## 2022-02-22 ENCOUNTER — OFFICE VISIT (OUTPATIENT)
Dept: NEUROLOGY | Age: 72
End: 2022-02-22
Payer: MEDICARE

## 2022-02-22 VITALS
DIASTOLIC BLOOD PRESSURE: 72 MMHG | SYSTOLIC BLOOD PRESSURE: 108 MMHG | WEIGHT: 160 LBS | HEIGHT: 61 IN | HEART RATE: 52 BPM | OXYGEN SATURATION: 98 % | BODY MASS INDEX: 30.21 KG/M2

## 2022-02-22 DIAGNOSIS — Z78.9 DIFFICULTY WITH CPAP FULL FACE MASK USE: ICD-10-CM

## 2022-02-22 DIAGNOSIS — G47.33 OBSTRUCTIVE SLEEP APNEA: Primary | ICD-10-CM

## 2022-02-22 DIAGNOSIS — Z78.9 DIFFICULTY USING CONTINUOUS POSITIVE AIRWAY PRESSURE (CPAP) DEVICE: ICD-10-CM

## 2022-02-22 PROCEDURE — 1090F PRES/ABSN URINE INCON ASSESS: CPT | Performed by: PHYSICIAN ASSISTANT

## 2022-02-22 PROCEDURE — G8417 CALC BMI ABV UP PARAM F/U: HCPCS | Performed by: PHYSICIAN ASSISTANT

## 2022-02-22 PROCEDURE — 1123F ACP DISCUSS/DSCN MKR DOCD: CPT | Performed by: PHYSICIAN ASSISTANT

## 2022-02-22 PROCEDURE — 99213 OFFICE O/P EST LOW 20 MIN: CPT | Performed by: PHYSICIAN ASSISTANT

## 2022-02-22 PROCEDURE — G8399 PT W/DXA RESULTS DOCUMENT: HCPCS | Performed by: PHYSICIAN ASSISTANT

## 2022-02-22 PROCEDURE — G8484 FLU IMMUNIZE NO ADMIN: HCPCS | Performed by: PHYSICIAN ASSISTANT

## 2022-02-22 PROCEDURE — 1036F TOBACCO NON-USER: CPT | Performed by: PHYSICIAN ASSISTANT

## 2022-02-22 PROCEDURE — G8427 DOCREV CUR MEDS BY ELIG CLIN: HCPCS | Performed by: PHYSICIAN ASSISTANT

## 2022-02-22 PROCEDURE — 4040F PNEUMOC VAC/ADMIN/RCVD: CPT | Performed by: PHYSICIAN ASSISTANT

## 2022-02-22 PROCEDURE — 3017F COLORECTAL CA SCREEN DOC REV: CPT | Performed by: PHYSICIAN ASSISTANT
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
## 2022-02-22 NOTE — PATIENT INSTRUCTIONS
Discussed STEPHANIE treatment options including CPAP therapy, oral dental devices, and surgical options, including the Inspire device (hypoglossal nerve stimulator); Oral appliances (eg, mandibular advancement devices, tongue retaining devices) are an alternative therapeutic treatment option for mild to moderate STEPHANIE in pts  who decline or fail to adhere to positive airway pressure therapy and who have a preference for such treatment. Patient education: Sleep apnea in adults       INTRODUCTION  Normally during sleep, air moves through the throat and in and out of the lungs at a regular rhythm. In a person with sleep apnea, air movement is periodically diminished or stopped. There are two types of sleep apnea: obstructive sleep apnea and central sleep apnea. In obstructive sleep apnea, breathing is abnormal because of narrowing or closure of the throat. In central sleep apnea, breathing is abnormal because of a change in the breathing control and rhythm. Sleep apnea is a serious condition that can affect a person's ability to safely perform normal daily activities and can affect long term health. Approximately 25 percent of adults are at risk for sleep apnea of some degree. Men are more commonly affected than women. Other risk factors include middle and older age, being overweight or obese, and having a small mouth and throat. This topic review focuses on the most common type of sleep apnea in adults, obstructive sleep apnea (STEPHANIE). HOW SLEEP APNEA OCCURS  The throat is surrounded by muscles that control the airway for speaking, swallowing, and breathing. During sleep, these muscles are less active, and this causes the throat to narrow. In most people, this narrowing does not affect breathing. In others, it can cause snoring, sometimes with reduced or completely blocked airflow. A completely blocked airway without airflow is called an obstructive apnea.  Partial obstruction with diminished airflow is called a hypopnea. A person may have apnea and hypopnea during sleep. Insufficient breathing due to apnea or hypopnea causes oxygen levels to fall and carbon dioxide to rise. Because the airway is blocked, breathing faster or harder does not help to improve oxygen levels until the airway is reopened. Typically, the obstruction requires the person to awaken to activate the upper airway muscles. Once the airway is opened, the person then takes several deep breaths to catch up on breathing. As the person awakens, he or she may move briefly, snort or snore, and take a deep breath. Less frequently, a person may awaken completely with a sensation of gasping, smothering, or choking. If the person falls back to sleep quickly, he or she will not remember the event. Many people with sleep apnea are unaware of their abnormal breathing in sleep, and all patients underestimate how often their sleep is interrupted. Awakening from sleep causes sleep to be unrefreshing and causes fatigue and daytime sleepiness. Anatomic causes of obstructive sleep apnea   Most patients have STEPHANIE because of a small upper airway. As the bones of the face and skull develop, some people develop a small lower face, a small mouth, and a tongue that seems too large for the mouth. These features are genetically determined, which explains why STEPHANIE tends to cluster in families. Obesity is another major factor. Tonsil enlargement can be an important cause, especially in children. SLEEP APNEA SYMPTOMS  The main symptoms of STEPHANIE are loud snoring, fatigue, and daytime sleepiness. However, some people have no symptoms. For example, if the person does not have a bed partner, he or she may not be aware of the snoring. Fatigue and sleepiness have many causes and are often attributed to overwork and increasing age. As a result, a person may be slow to recognize that they have a problem. A bed partner or spouse often prompts the patient to seek medical care.   Other symptoms may include one or more of the following:  ?Restless sleep  ? Awakening with choking, gasping, or smothering  ? Morning headaches, dry mouth, or sore throat  ? Waking frequently to urinate  ? Awakening unrested, groggy  ? Low energy, difficulty concentrating, memory impairment    Risk factors  Certain factors increase the risk of sleep apnea. ?Increasing age [de-identified] STEPHANIE occurs at all ages, but it is more common in middle and older age adults. ?Male sex  STEPHANIE is two times more common in men, especially in middle age. ?Obesity  The more obese a person is, the more likely he or she is to have STEPHANIE. ? Sedation from medication or alcohol  This interferes with the ability to awaken from sleep and can lengthen periods of apnea (no breathing), with potentially dangerous consequences. ? Abnormality of the airway. SLEEP APNEA CONSEQUENCES  Complications of sleep apnea can include daytime sleepiness and difficulty concentrating. The consequence of this is an increased risk of accidents and errors in daily activities. Studies have shown that people with severe STEPHANIE are more than twice as likely to be involved in a motor vehicle accident as people without these conditions. People with STEPHANIE are encouraged to discuss options for driving, working, and performing other high-risk tasks with a healthcare provider. In addition, people with untreated STEPHANIE may have an increased risk of cardiovascular problems such as high blood pressure, heart attack, abnormal heart rhythms, or stroke. This risk may be due to changes in the heart rate and blood pressure that occur during sleep. SLEEP APNEA DIAGNOSIS  The diagnosis of STEPHANIE is best made by a knowledgeable sleep medicine specialist who has an understanding of the individual's health issues. The diagnosis is usually based upon the person's medical history, physical examination, and testing, including:  ? A complaint of snoring and ineffective sleep  ? Neck size (greater than 16 inches in men or 14 inches in women) is associated with an increased risk of sleep apnea  ? A small upper airway: difficulty seeing the throat because of a tongue that is large for the mouth  ? High blood pressure, especially if it is resistant to treatment  ? If a bed partner has observed the patient during episodes of stopped breathing (apnea), choking, or gasping during sleep, there is a strong possibility of sleep apnea. Testing is usually performed in a sleep laboratory. A full sleep study is called a polysomnogram. The polysomnogram measures the breathing effort and airflow, blood oxygen level, heart rate and rhythm, duration of the various stages of sleep, body position, and movement of the arms/legs. Home monitoring devices are available that can perform a sleep study. This is a reasonable alternative to conventional testing in a sleep laboratory if the clinician strongly suspects moderate or severe sleep apnea and the patient does not have other illnesses or sleep disorders that may interfere with the results. SLEEP APNEA TREATMENT  Sleep apnea is best treated by a knowledgeable sleep medicine specialist. The goal of treatment is to maintain an open airway during sleep. Effective treatment will eliminate the symptoms of sleep disturbance; long-term health consequences are also reduced. Most treatments require nightly use. The challenge for the clinician and the patient is to select an effective therapy that is appropriate for the patient's problem and that is acceptable for long term use. Auto-titrating CPAP delivers an amount of PAP that varies during the night. The variation is dependent on event detection software algorithms, which will increase the pressure gradually in response to flow changes until adequate patency is detected.   After a period of sustained upper airway patency, the delivered level of pressure gradually decreases until the algorithm identifies recurrent upper airway obstruction, at which point the delivered pressure again increases. The result is that the delivered pressure varies throughout the night, in an effort to provide the lowest pressure that is necessary to maintain upper airway patency. Continuous positive airway pressure (CPAP)  The most effective treatment for sleep apnea uses air pressure from a mechanical device to keep the upper airway open during sleep. A CPAP (continuous positive airway pressure)  device uses an air-tight attachment to the nose, typically a mask, connected to a tube and a blower which generates the pressure. Devices that fit comfortably into the nasal opening, rather than over the nose, are also available. CPAP should be used any time the person sleeps (day or night). The CPAP device is usually used for the first time in the sleep lab, where a technician can adjust the pressure and select the best equipment to keep the airway open. Alternatively, an auto device with a self-adjusting pressure feature, provided with proper education and training, can get treatment started without another sleep test. While the treatment may seem uncomfortable, noisy, or bulky at first, most people accept the treatment after experiencing better sleep. However, difficulty with mask comfort and nasal congestion prevent up to 50 percent of people from using the treatment on a regular basis. Continued follow up with a healthcare provider helps to ensure that the treatment is effective and comfortable. Information from the CPAP machine is often used by physicians, therapists, and insurers to track the success of treatment. CPAP can be delivered with different features to improve comfort and solve problems that may come up during treatment. Changes in treatment may be needed if symptoms do not improve or if the persons condition changes, such as a gain or loss of weight.   Adjust sleep position  Adjusting sleep position (to stay off the back) may help improve sleep quality in people who have STEPHANIE when sleeping on the back. However, this is difficult to maintain throughout the night and is rarely an adequate solution. Weight loss  Weight loss may be helpful for obese or overweight patients. Weight loss may be accomplished with dietary changes, exercise, and/or surgical treatment. However, it can be difficult to maintain weight loss; the five-year success of non-surgical weight loss is only 5 percent, meaning that 95 percent of people regain lost weight. Avoid alcohol and other sedatives  Alcohol can worsen sleepiness, potentially increasing the risk of accidents or injury. People with STEPHANIE are often counseled to drink little to no alcohol, even during the daytime. Similarly, people who take anti-anxiety medications or sedatives to sleep should speak with their healthcare provider about the safety of these medications. People with STEPHANIE must notify all healthcare providers, including surgeons, about their condition and the potential risks of being sedated. People with STEPHANIE who are given anesthesia and/or pain medications require special management and close monitoring to reduce the risk of a blocked airway. Dental devices  A dental device, called an oral appliance or mandibular advancement device, can reposition the jaw (mandible), bringing the tongue and soft palate forward as well. This may relieve obstruction in some people. This treatment is excellent for reducing snoring, although the effect on STEPHANIE is sometimes more limited. As a result, dental devices are best used for mild cases of STEPHANIE when relief of snoring is the main goal. Failure to tolerate and accept CPAP is another indication for dental devices. While dental devices are not as effective as CPAP for STEPHANIE, some patients prefer a dental device to CPAP. Side effects of dental devices are generally minor but may include changes to the bite with prolonged use.   Surgical treatment  Surgery is an alternative therapy for patients who cannot tolerate or do not improve with nonsurgical treatments such as CPAP or oral devices. Surgery can also be used in combination with other nonsurgical treatments. Surgical procedures reshape structures in the upper airways or surgically reposition bone or soft tissue. Uvulopalatopharyngoplasty (UPPP) removes the uvula and excessive tissue in the throat, including the tonsils, if present. Other procedures, such as maxillomandibular advancement (MMA), address both the upper and lower pharyngeal airway more globally. UPPP alone has limited success rates (less than 50 percent) and people can relapse (when STEPHANIE symptoms return after surgery). As a result, this surgery is only recommended in a minority of people and should be considered with caution. MMA may have a higher success rate, particularly in people with abnormal jaw (maxilla and mandible) anatomy, but it is the most complicated procedure. A newer surgical approach, nerve stimulation to protrude the tongue, has promising success rates in very selected people. Tracheostomy creates a permanent opening in the neck. It is reserved for people with severe disease in whom less drastic measures have failed or are inappropriate. Although it is always successful in eliminating obstructive sleep apnea, tracheostomy requires significant lifestyle changes and carries some serious risks (eg, infection, bleeding, blockage). All surgical treatments require discussions about the goals of treatment, the expected outcomes, and potential complications. Hypoglossal nerve stimulator- \"Inspire\" device    PAP treatment failure:  Possible causes of treatment failure include nonadherence or suboptimal adherence, weight gain, an inappropriate level of prescribed positive pressure, or an additional disorder causing sleepiness (eg, narcolepsy) that may require alterations in the therapeutic regimen.  A review of medications should also be undertaken since many drugs may lead to sleepiness. Inadequate sleep time may also negate the expected effects from treatment of STEPHANIE. Also, pt's can have persistent hypersomnolence associated with sleep apnea even in the presence of adequate therapy and at those times Provigil or Nuvigil or other stimulants may be indicated. Once the patient's positive airway pressure therapy has been optimized and symptoms resolved, a regimen of long-term follow-up should be established. Annual visits are reasonable, with more frequent visits in between if new issues arise. The purpose of long-term follow-up is to assess usage and monitor for recurrent STEPHANIE, new side effects, air leakage, and fluctuations in body weight. WHERE TO GET MORE INFORMATION  Your healthcare provider is the best source of information for questions and concerns related to your medical problem. Organizations  [employer] Association  Provides information about sleep apnea to the public, publishes a newsletter, and serves as an advocate for people with the disorder. Mary, [address]   [email or web address]@[email or web address]. org   http://elena.[employer]/. org   Tel: [phone]   Fax: [facility] organization that works to [employer] and safety by promoting public understanding of sleep and sleep disorders. Supports sleep-related education, research, and advocacy; produces and distributes educational materials to the public and healthcare professionals; and offers postdoctoral fellowships and grants for sleep researchers. [address]   Ian@[email or web address] org   [email or web address] org   Tel: [phone]   Fax: [phone]    Important information:  Medicare/private insurance CPAP/BiPAP/APAP requirements:  Medicare/private insurance has specific requirements for PAP compliance that must be met during the first 90 days of use to continue coverage for CPAP/BiPAP/APAP  from day 91 and beyond. The policy requires that patients use a PAP device 4 hours per 24 hour period, at least 70% of the time over a 30 day period. This data must be downloaded as a report direct from the PAP devices. This is called a compliance download. Your PAP supplier will assist you in this matter. Note:  Where applicable, we will utilize PAP device efficiency reports, additional testing, and face-to-face  clinical evaluation subsequent to any treatment, changes in treatment, and continued treatment. Caution:  Please abstain from driving or engaging in other activities which may be hazardous in the presence of diminished alertness or daytime drowsiness. And avoid the use of sedatives or alcohol, which can worsen sleep apnea and daytime drowsiness. Mask suggestions:  -     Resmed Airfit N20 (Nasal) or F20 (Full face mask). They conform to your face, thus decreasing the potential for mask leakage. You might like the AirTouch F20(full face mask). It has a \"memory foam\" like cushion. The AirFit F30 is a smaller style full face mask designed to sit low on and cover less of your face for fewer facial marks. AirFit N30i has a top of the head tube with a nasal mask. AirFit P10 reported to be the most comfortable nasal pillow mask. Resmed Mirage FX reported to be the most comfortable nasal mask. Resmed Mirage Walters reported to be the most comfortable hybrid mask. AirTouch N20-memory foam nasal mask. Respironics: You might also like to try a nasal mask called a Dreamwear nasal mask or the Dreamwear nasal pillow. Another suggestion is the [facility], it is a minimal contact full face mask. The Debi lee under the nose design makes it the only full face mask that won't cause red marks on the bridge of your nose when compared to other full face masks.  The Dreamwear full face mask has a  soft feel, unique in-frame air-flow, and innovative air tube connection at the top of the head for the ultimate in sleep comfort. Comfort Gel Blue. Dreamwear gel pillows. Brian & Cassie: Brevida nasal pillow mask and Simplus FFM    The use of a memory foam CPAP pillow supports the head and neck throughout the night.

## 2022-03-07 ENCOUNTER — OFFICE VISIT (OUTPATIENT)
Dept: VASCULAR SURGERY | Age: 72
End: 2022-03-07
Payer: MEDICARE

## 2022-03-07 ENCOUNTER — HOSPITAL ENCOUNTER (OUTPATIENT)
Dept: VASCULAR LAB | Age: 72
Discharge: HOME | End: 2022-03-07
Payer: MEDICARE

## 2022-03-07 VITALS
HEART RATE: 59 BPM | OXYGEN SATURATION: 92 % | DIASTOLIC BLOOD PRESSURE: 74 MMHG | RESPIRATION RATE: 18 BRPM | TEMPERATURE: 96 F | SYSTOLIC BLOOD PRESSURE: 120 MMHG

## 2022-03-07 DIAGNOSIS — I73.9 PVD (PERIPHERAL VASCULAR DISEASE) (HCC): ICD-10-CM

## 2022-03-07 DIAGNOSIS — I73.9 PVD (PERIPHERAL VASCULAR DISEASE) (HCC): Primary | ICD-10-CM

## 2022-03-07 PROCEDURE — 99213 OFFICE O/P EST LOW 20 MIN: CPT | Performed by: PHYSICIAN ASSISTANT

## 2022-03-07 PROCEDURE — G8417 CALC BMI ABV UP PARAM F/U: HCPCS | Performed by: PHYSICIAN ASSISTANT

## 2022-03-07 PROCEDURE — 1090F PRES/ABSN URINE INCON ASSESS: CPT | Performed by: PHYSICIAN ASSISTANT

## 2022-03-07 PROCEDURE — G8399 PT W/DXA RESULTS DOCUMENT: HCPCS | Performed by: PHYSICIAN ASSISTANT

## 2022-03-07 PROCEDURE — 93923 UPR/LXTR ART STDY 3+ LVLS: CPT

## 2022-03-07 PROCEDURE — 1123F ACP DISCUSS/DSCN MKR DOCD: CPT | Performed by: PHYSICIAN ASSISTANT

## 2022-03-07 PROCEDURE — G8427 DOCREV CUR MEDS BY ELIG CLIN: HCPCS | Performed by: PHYSICIAN ASSISTANT

## 2022-03-07 PROCEDURE — G8484 FLU IMMUNIZE NO ADMIN: HCPCS | Performed by: PHYSICIAN ASSISTANT

## 2022-03-07 PROCEDURE — 3017F COLORECTAL CA SCREEN DOC REV: CPT | Performed by: PHYSICIAN ASSISTANT

## 2022-03-07 PROCEDURE — 1036F TOBACCO NON-USER: CPT | Performed by: PHYSICIAN ASSISTANT

## 2022-03-07 PROCEDURE — 4040F PNEUMOC VAC/ADMIN/RCVD: CPT | Performed by: PHYSICIAN ASSISTANT

## 2022-03-07 NOTE — PROGRESS NOTES
Patient Care Team:  Niko Velasco DO as PCP - [place] [id number] as PCP - [facility]  David Damon MD as Consulting Physician (Family Medicine)  MD Faustino Haskins MD as Consulting Physician (Otolaryngology)  Thom Grewal PA-C as Physician Assistant ([address] [place] [address])  MICHELEL Molina as Nurse Practitioner (Certified Clinical Nurse Specialist)  Michael Ibarra (Physician Assistant Medical)      History and Physical  Ms. Valderrama is a 68-year-old female who has a past medical history that includes CAD, obstructive sleep apnea with use of CPAP, hyperlipidemia, hypertension, stage III chronic kidney disease and PVD. Today we are following up for her PVD. Currently she is on aspirin 81 mg daily. She reports that she has tried statins in the past and has failed these due to arthralgias/myalgias. She does not smoke. She denies any claudication, ischemic rest pain or nonhealing wounds on her lower extremities. She has bilateral lower extremity swelling for which she wears compression stockings.     Phani Morel is a 81 y.o. female with the following [facility]:  Patient Active Problem List    Diagnosis Date Noted    Myalgia due to statin 01/24/2022    Obstructive sleep apnea 10/01/2021    S/P FESS (functional endoscopic sinus surgery) 11/19/2020     Bilateral partial ethmoidectomy with maxillary antrostomies 3/11/2020      Deviated nasal septum 02/06/2020    Nasal turbinate hypertrophy 02/06/2020    Chronic pansinusitis 02/06/2020    Nasal obstruction 02/06/2020    Bradycardia 12/10/2019    Difficulty with CPAP full face mask use 12/10/2018    Abnormal nuclear cardiac imaging test     Positive cardiac stress test     Essential hypertension     Hypercholesteremia     Mixed hyperlipidemia 01/06/2015    CAD (coronary artery disease)     Chest tightness or pressure 02/02/2014 2/2/2014  lexiscan Positive for inferior apical myocardial ischemia, EF 62%, 2% ischemic myocardium on stress, low risk findings, AUC indication 15, AUC score 4  2/2/2014  Cath  Mild to moderate distal disease, normal LVFX  1/21/2016  lexiscan  Positive for inferor lateral MI + myocardial ischemia, EF 69%, 12/11% ischemic myocardium on stress, low risk findings, AUC indication 15, AUC score 4  1/22/16  Cath  Mild to moderate distal disease, normal LVFX         Current Outpatient Medications   Medication Sig Dispense Refill    aspirin 81 MG EC tablet Take 81 mg by mouth daily      ferrous sulfate (IRON 325) 325 (65 Fe) MG tablet Take 325 mg by mouth daily (with breakfast)      amLODIPine (NORVASC) 5 MG tablet Take 1 tablet by mouth daily 90 tablet 3    SYMBICORT 160-4.5 MCG/ACT AERO Inhale 2 puffs into the lungs daily       cetirizine (ZYRTEC) 10 MG tablet Take 10 mg by mouth daily Patient takes morning and at bedtime      DULoxetine (CYMBALTA) 30 MG extended release capsule Take 30 mg by mouth daily Patient takes 3, 30 mg tablets daily      ARIPiprazole (ABILIFY) 2 MG tablet TAKE 1 TABLET BY MOUTH EVERY DAY      metoprolol tartrate (LOPRESSOR) 25 MG tablet Take 0.5 tablets by mouth daily (Patient taking differently: Take 50 mg by mouth daily ) 90 tablet 3    HYDROcodone-acetaminophen (NORCO)  MG per tablet Take 1 tablet by mouth every 6 hours as needed for Pain.  Cyanocobalamin (B-12 COMPLIANCE INJECTION IJ) Inject as directed      ondansetron (ZOFRAN ODT) 8 MG TBDP disintegrating tablet Place 1 tablet under the tongue every 8 hours as needed for Nausea or Vomiting (Patient taking differently: Place 4 mg under the tongue every 8 hours as needed for Nausea or Vomiting ) 6 tablet 1    cloNIDine (CATAPRES) 0.1 MG tablet Take one tablet when BP is over 170/90. May take up to 3 tablets daily as needed.  90 tablet 3    hydrochlorothiazide (HYDRODIURIL) 25 MG tablet Take 25 mg by mouth daily      irbesartan (AVAPRO) 300 MG tablet Take 300 mg by mouth daily      oxybutynin (DITROPAN) 5 MG tablet Take 5 mg by mouth daily      montelukast (SINGULAIR) 10 MG tablet Take 10 mg by mouth daily      ALPRAZolam (XANAX) 0.25 MG tablet Take 0.25 mg by mouth nightly as needed.  PROVENTIL  (90 BASE) MCG/ACT inhaler Inhale 2 puffs into the lungs every 6 hours as needed        No current facility-administered medications for this visit. Allergies: Meloxicam, Diovan [valsartan], Lipitor [atorvastatin], and Demeclocycline hcl  Past Medical History:   Diagnosis Date    Anemia     Anxiety     Asthma     CAD (coronary artery disease)     CAD (coronary artery disease)     Cervical stenosis of spine     Chest tightness or pressure 02/02/2014 2/2/2014  lexiscan Positive for inferior apical myocardial ischemia, EF 62%, 2% ischemic myocardium on stress, low risk findings, AUC indication 15, AUC score 4     Chronic back pain     Chronic kidney disease, stage III (moderate) ([facility])     GFR 40 mL/minute 7/10/15. no problems now    CPAP (continuous positive airway pressure) dependence     Gastritis     History of blood transfusion     Hyperlipidemia 01/06/2015    Hypertension     IBS (irritable bowel syndrome)     Intracranial hemorrhage ([facility] [address] 03/2020    STEPHANIE (obstructive sleep apnea)     Osteoarthritis     Osteoporosis 08/2015    PONV (postoperative nausea and vomiting)     Torn tendon     LT HIP    Vitamin D deficiency      Past Surgical History:   Procedure Laterality Date    ANKLE SURGERY Right     Right ankle fusion.  APPENDECTOMY      BACK SURGERY      BREAST BIOPSY Right 2003    b9    BREAST REDUCTION SURGERY Bilateral 1991    CARDIAC CATHETERIZATION  2/2/14  JDT    EF 50%    CARDIAC CATHETERIZATION  01/2016    Normal EF, mild nonocclusive CAD    CERVICAL FUSION      The [address] [place] [address], Dr. Tavo Durbin.     CHOLECYSTECTOMY      COLONOSCOPY      DILATATION, ESOPHAGUS      ENDOSCOPY, COLON, DIAGNOSTIC      FRACTURE SURGERY      GASTRIC FUNDOPLICATION  4671    HERNIA REPAIR      SABINE    HYSTERECTOMY      total    JOINT REPLACEMENT      bilateral knees and hip    NASAL SINUS SURGERY N/A 3/11/2020    NASAL ENDOSCOPY, REPAIR OF NASAL SEPTUM, PARTIAL ETHMOIDECTOMY, CAUTERY TURBINATE MUCOSA, INTRAMURAL, THERAPEUTIC FRACTURE INFERIOR TURBINATES, OPEN MAXILLARY SINUS performed by Faustino Zuniga MD at [address] [address] TOTAL HIP ARTHROPLASTY  11/2014    TOTAL KNEE ARTHROPLASTY Bilateral     TOTAL KNEE ARTHROPLASTY Right 2002    Total knee replacement, right knee revision, Dr. Rima Casanova 2002. Family History   Problem Relation Age of Onset    Kidney Disease Mother     High Blood Pressure Mother     Other Mother         AAA    Heart Disease Father     Kidney Disease Father     Brain Cancer Brother     Stroke Paternal Grandfather      Social History     Tobacco Use    Smoking status: Passive Smoke Exposure - Never Smoker    Smokeless tobacco: Never Used   Substance Use Topics    Alcohol use: No       Review of Systems    Constitutional  no significant activity change, appetite change, or unexpected weight change. No fever or chills. No diaphoresis or significant fatigue. HENT  no significant rhinorrhea or epistaxis. No tinnitus or significant hearing loss. Eyes  no sudden vision change or amaurosis. Respiratory  no significant shortness of breath, wheezing, or stridor. No apnea, cough, or chest tightness associated with shortness of breath. Cardiovascular  no chest pain, syncope, or significant dizziness. No palpitations or significant leg swelling. (see HPI)  Gastrointestinal  no abdominal swelling or pain. No blood in stool. No severe constipation, diarrhea, nausea, or vomiting. Genitourinary  No difficulty urinating, dysuria, frequency, or urgency. No flank pain or hematuria.   Musculoskeletal she cannot dorsiflex her right foot due to her ankle being fused. Skin  no color change, rash, pallor, or new wound. Neurologic  no dizziness, facial asymmetry, or light headedness. No seizures. No speech difficulty or lateralizing weakness. Hematologic  no easy bruising or excessive bleeding. Psychiatric  no severe anxiety or nervousness. No confusion. All other review of systems are negative. Physical Exam    /74 Comment: right  Pulse 59   Temp 96 °F (35.6 °C)   Resp 18   SpO2 92%     Constitutional  well developed, well nourished. No diaphoresis or acute distress. HENT  head normocephalic. Right external ear canal appears normal.  Left external ear canal appears normal.  Septum appears midline. Eyes  conjunctiva normal.  EOMS normal.  No exudate. No icterus. Neck- ROM appears normal, no tracheal deviation. Cardiovascular  Regular rate and rhythm. Heart sounds are normal.  No murmur, rub, or gallop. Carotid pulses are 2+ to palpation bilaterally without bruit. Extremities - Radial and ulnar pulses are 2+ to palpation bilaterally. Bilateral  DP pulses are palpable. No cyanosis, clubbing. She has BLE edema noted. .  No signs atheroembolic event. Pulmonary  effort appears normal.  No respiratory distress. Lungs - Breath sounds normal. No wheezes or rales. GI - Abdomen  soft, non tender, bowel sounds X 4 quadrants. No guarding or rebound tenderness. No distension or palpable mass. Genitourinary  deferred. Musculoskeletal  ROM appears normal.  No significant edema. Neurologic  alert and oriented X 3. Physiologic. Skin  warm, dry, and intact. No rash, erythema, or pallor. Psychiatric  mood, affect, and behavior appear normal.  Judgment and thought processes appear normal.    Risk factors for atherosclerosis of all vascular beds have been reviewed with the patient including:  Family history, tobacco abuse in all forms, elevated cholesterol, hyperlipidemia, and diabetes.     Lower extremity arterial study: 3/7/22  Right OLIVA 1.44, Left OLIVA 1.40. Individual films reviewed: Yes. Test results were reviewed with the patient. Assessment      1.  PVD (peripheral vascular disease) ([facility] [address]          Plan      Recommend she continue aspirin 81 mg daily  Recommend no smoking  Recommend she walk as much as possible  Recommend low-fat low-cholesterol diet in moderation  We will follow-up in 1 year with a repeat lower extremity arterial study or sooner if claudication worsens/develops, develops new wound or IRP

## 2022-04-21 ENCOUNTER — HOSPITAL ENCOUNTER (OUTPATIENT)
Dept: VASCULAR LAB | Age: 72
Discharge: HOME | End: 2022-04-21
Payer: MEDICARE

## 2022-04-21 DIAGNOSIS — M79.604 PAIN OF RIGHT LOWER EXTREMITY: ICD-10-CM

## 2022-04-21 DIAGNOSIS — Z86.718 HX OF DEEP VENOUS THROMBOSIS: ICD-10-CM

## 2022-04-21 DIAGNOSIS — M79.89 LEG SWELLING: ICD-10-CM

## 2022-04-21 PROCEDURE — 93971 EXTREMITY STUDY: CPT

## 2022-05-04 ENCOUNTER — OFFICE VISIT (OUTPATIENT)
Dept: NEUROLOGY | Age: 72
End: 2022-05-04
Payer: MEDICARE

## 2022-05-04 VITALS
BODY MASS INDEX: 30.21 KG/M2 | HEART RATE: 63 BPM | DIASTOLIC BLOOD PRESSURE: 70 MMHG | HEIGHT: 61 IN | SYSTOLIC BLOOD PRESSURE: 119 MMHG | WEIGHT: 160 LBS

## 2022-05-04 DIAGNOSIS — Z99.89 CPAP (CONTINUOUS POSITIVE AIRWAY PRESSURE) DEPENDENCE: ICD-10-CM

## 2022-05-04 DIAGNOSIS — G47.33 OBSTRUCTIVE SLEEP APNEA: Primary | ICD-10-CM

## 2022-05-04 PROCEDURE — G8427 DOCREV CUR MEDS BY ELIG CLIN: HCPCS | Performed by: PHYSICIAN ASSISTANT

## 2022-05-04 PROCEDURE — 1036F TOBACCO NON-USER: CPT | Performed by: PHYSICIAN ASSISTANT

## 2022-05-04 PROCEDURE — G8417 CALC BMI ABV UP PARAM F/U: HCPCS | Performed by: PHYSICIAN ASSISTANT

## 2022-05-04 PROCEDURE — G8399 PT W/DXA RESULTS DOCUMENT: HCPCS | Performed by: PHYSICIAN ASSISTANT

## 2022-05-04 PROCEDURE — 1090F PRES/ABSN URINE INCON ASSESS: CPT | Performed by: PHYSICIAN ASSISTANT

## 2022-05-04 PROCEDURE — 1123F ACP DISCUSS/DSCN MKR DOCD: CPT | Performed by: PHYSICIAN ASSISTANT

## 2022-05-04 PROCEDURE — 99213 OFFICE O/P EST LOW 20 MIN: CPT | Performed by: PHYSICIAN ASSISTANT

## 2022-05-04 PROCEDURE — 3017F COLORECTAL CA SCREEN DOC REV: CPT | Performed by: PHYSICIAN ASSISTANT

## 2022-05-04 PROCEDURE — 4040F PNEUMOC VAC/ADMIN/RCVD: CPT | Performed by: PHYSICIAN ASSISTANT

## 2022-05-04 NOTE — PROGRESS NOTES
[facility]  [address] [place] [address] [address], [address]  [place], Lorraine Kincaid  Phone [phone]  Fax [phone]       [facility] Sleep Follow Up Encounter      Information:   Patient Name: Marcela Wing  :   1950  Age:   81 y.o. MRN:   [id number]  Account #:  [de-identified]  Today:                22    Provider:  Saray Flores PA-C    Chief Complaint   Patient presents with    Sleep Apnea     follow up, pt states she is doing a little better         Subjective:   Marcela Wing is a 81 y.o. female  with a history of STEPHANIE who comes in for a sleep clinic follow up. She was diagnosed with STEPHANIE last year. She was referred for an HST due to snoring and excessive daytime somnolence. The HST showed an AHI of 15.0. She is prescribed auto CPAP with a pressure range of 8cm to 16cm. She had not met CPAP compliance at the last office visit. She planned to make further attempt at CPAP usage. She has used CPAP more consistently with 21/30 days usage recently but only 57% >=4 hours. She has missed a few days when her grandchildren spent the night. She does feel refreshed with consistent use. She still wakes up at 2:00-3:30 am stays up about an hour and returns to sleep. She may average 4 hours of sleep per night. She uses a FFM. She is unaware of the leaking. She has a nasal mask with a chin strap that she is going to try.      Objective:     Past Medical History:   Diagnosis Date    Anemia     Anxiety     Asthma     CAD (coronary artery disease)     CAD (coronary artery disease)     Cervical stenosis of spine     Chest tightness or pressure 2014  lexiscan Positive for inferior apical myocardial ischemia, EF 62%, 2% ischemic myocardium on stress, low risk findings, AUC indication 15, AUC score 4     Chronic back pain     Chronic kidney disease, stage III (moderate) (HCC)     GFR 40 mL/minute 7/10/15. no problems now    CPAP (continuous positive airway pressure) dependence     Gastritis  History of blood transfusion     Hyperlipidemia 01/06/2015    Hypertension     IBS (irritable bowel syndrome)     Intracranial hemorrhage ([facility] [address] 03/2020    STEPHANIE (obstructive sleep apnea)     Osteoarthritis     Osteoporosis 08/2015    PONV (postoperative nausea and vomiting)     Torn tendon     LT HIP    Vitamin D deficiency        Past Surgical History:   Procedure Laterality Date    ANKLE SURGERY Right     Right ankle fusion.  APPENDECTOMY      BACK SURGERY      BREAST BIOPSY Right 2003    b9    BREAST REDUCTION SURGERY Bilateral 1991    CARDIAC CATHETERIZATION  2/2/14  JDT    EF 50%    CARDIAC CATHETERIZATION  01/2016    Normal EF, mild nonocclusive CAD    CERVICAL FUSION      The [address] [place] [address], Dr. Jamal Harrell.  CHOLECYSTECTOMY      COLONOSCOPY      DILATATION, ESOPHAGUS      ENDOSCOPY, COLON, DIAGNOSTIC      FRACTURE SURGERY      GASTRIC FUNDOPLICATION  1139    HERNIA REPAIR      SABINE    HYSTERECTOMY      total    JOINT REPLACEMENT      bilateral knees and hip    NASAL SINUS SURGERY N/A 3/11/2020    NASAL ENDOSCOPY, REPAIR OF NASAL SEPTUM, PARTIAL ETHMOIDECTOMY, CAUTERY TURBINATE MUCOSA, INTRAMURAL, THERAPEUTIC FRACTURE INFERIOR TURBINATES, OPEN MAXILLARY SINUS performed by Libia Regan MD at [address] [address] TOTAL HIP ARTHROPLASTY  11/2014    TOTAL KNEE ARTHROPLASTY Bilateral     TOTAL KNEE ARTHROPLASTY Right 2002    Total knee replacement, right knee revision, Dr. Martha Vaughn 2002.         Recent Hospitalizations  ·     Significant Injuries  ·     Family History   Problem Relation Age of Onset    Kidney Disease Mother     High Blood Pressure Mother     Other Mother         AAA    Heart Disease Father     Kidney Disease Father     Brain Cancer Brother     Stroke Paternal Grandfather        Social History  Social History     Tobacco Use   Smoking Status Passive Smoke Exposure - Never Smoker   Smokeless Tobacco Never Used     Social History     Substance and Sexual Activity   Alcohol Use No     Social History     Substance and Sexual Activity   Drug Use No         Current Outpatient Medications   Medication Sig Dispense Refill    aspirin 81 MG EC tablet Take 81 mg by mouth daily      ferrous sulfate (IRON 325) 325 (65 Fe) MG tablet Take 325 mg by mouth daily (with breakfast)      amLODIPine (NORVASC) 5 MG tablet Take 1 tablet by mouth daily 90 tablet 3    SYMBICORT 160-4.5 MCG/ACT AERO Inhale 2 puffs into the lungs daily       cetirizine (ZYRTEC) 10 MG tablet Take 10 mg by mouth daily Patient takes morning and at bedtime      DULoxetine (CYMBALTA) 30 MG extended release capsule Take 30 mg by mouth daily Patient takes 3, 30 mg tablets daily      ARIPiprazole (ABILIFY) 2 MG tablet TAKE 1 TABLET BY MOUTH EVERY DAY      metoprolol tartrate (LOPRESSOR) 25 MG tablet Take 0.5 tablets by mouth daily (Patient taking differently: Take 50 mg by mouth daily ) 90 tablet 3    HYDROcodone-acetaminophen (NORCO)  MG per tablet Take 1 tablet by mouth every 6 hours as needed for Pain.  Cyanocobalamin (B-12 COMPLIANCE INJECTION IJ) Inject as directed      ondansetron (ZOFRAN ODT) 8 MG TBDP disintegrating tablet Place 1 tablet under the tongue every 8 hours as needed for Nausea or Vomiting (Patient taking differently: Place 4 mg under the tongue every 8 hours as needed for Nausea or Vomiting ) 6 tablet 1    cloNIDine (CATAPRES) 0.1 MG tablet Take one tablet when BP is over 170/90. May take up to 3 tablets daily as needed. 90 tablet 3    hydrochlorothiazide (HYDRODIURIL) 25 MG tablet Take 25 mg by mouth daily      irbesartan (AVAPRO) 300 MG tablet Take 300 mg by mouth daily      oxybutynin (DITROPAN) 5 MG tablet Take 5 mg by mouth daily      montelukast (SINGULAIR) 10 MG tablet Take 10 mg by mouth daily      ALPRAZolam (XANAX) 0.25 MG tablet Take 0.25 mg by mouth nightly as needed.       PROVENTIL  (90 BASE) MCG/ACT inhaler Inhale 2 puffs into the lungs every 6 hours as needed        No current facility-administered medications for this visit. Allergies:  Meloxicam, Diovan [valsartan], Lipitor [atorvastatin], and Demeclocycline hcl    REVIEW OF SYSTEMS     Constitutional: []? Fever []? Sweats []? Chills []? Recent Injury   [x]? Denies all unless marked  HENT:[]? Headache  []? Head Injury  []? Sore Throat  []? Ear Pain  []? Dizziness []? Hearing Loss   [x]? Denies all unless marked  Musculoskeletal: []? Arthralgia  []? Myalgias []? Muscle cramps  []? Muscle twitches   [x]? Denies all unless marked   Spine:  []? Neck pain  []? Back pain  []? Sciaticia  [x]? Denies all unless marked  Neurological:[]? Visual Disturbance []? Double Vision []? Slurred Speech []? Trouble swallowing  []? Vertigo []? Tingling []? Numbness []? Weakness []? Loss of Balance   []? Loss of Consciousness []? Memory Loss []? Seizures  [x]? Denies all unless marked  Psychiatric/Behavioral:[]? Depression []? Anxiety  [x]? Denies all unless marked  Sleep: []? Insomnia []? Sleep Disturbance []? Snoring []? Restless Legs []? Daytime Sleepiness [x]? Sleep Apnea  []? Denies all unless marked    The MA has completed the ROS with the patient. I have reviewed it in its' entirety with the patient and agree with the documentation. PHYSICAL EXAM  /70   Pulse 63   Ht 5' 1\" (1.549 m)   Wt 160 lb (72.6 kg)   Breastfeeding No   BMI 30.23 kg/m²     Constitutional   Alert in NAD, well developed, pleasant and cooperative with exam  HEENT- Conjunctiva normal.  No scars, masses, or lesions over external nose or ears, hearing intact, no neck masses noted, no jugular vein distension, no bruit  Cardiac- Regular rate and rhythm  Pulmonary- Clear to auscultation, good expansion, normal effort without use of accessory muscles  Musculoskeletal  No significant wasting of muscles noted. No bony deformities  Extremities - No clubbing, cyanosis or edema  Skin  Warm, dry, and intact.   No rash, erythema, or pallor  Psychiatric  Mood, affect, and behavior appear normal      Neurological exam  Awake, alert, fluent oriented  appropriate affect  Attention and concentration appear appropriate  Recent and remote memory appears unremarkable  Speech normal without dysarthria  No clear issues with language of fund of knowledge    Cranial Nerve Exam     CN III, IV,VI-EOMI, No nystagmus, conjugate eye movements, no ptosis  CN VII-No facial assymetry    Motor Exam    Antigravity throughout upper and lower extremities bilaterally    Tremors and coordination    No tremors in hands or head noted     Gait    Normal base and speed  No ataxia    I reviewed the following studies:       []  :  Clinical laboratory test results     []  :  Radiology reports                    [x]  :  Review and summarization of medical records-compliance report      []     Request for medical records       []  :  Reviewed previous/recent polysomnogram report(s)      []  :  [employer] Sleepiness Scale       [x]  :  Compliance report :  The auto CPAP is set at a pressure range of 8cm to 16cm. Compliance download shows that she uses device: 70% of the time;  percentage of days with usage >=4 hours: 57%. AHI: 4.7 (large leaks)-discussed    Assessment:       ICD-10-CM    1. Obstructive sleep apnea  G47.33    2. CPAP (continuous positive airway pressure) dependence  Z99.89           []  :  Stable     [x]  :  Improving                       []  :  Well controlled              []  :  Resolving     []  :  Resolved     []  :  Inadequately controlled     []  :  Worsening     []  :  Additional workup planned        Plan:     No orders of the defined types were placed in this encounter. 1.   Previously advised of the etiology,  pathophysiology, diagnosis, treatment options, and risks of untreated STEPHANIE.  Risks may include, but are not limited to  hypertension, coronary artery disease, atrial fibrillation, CHF, diabetes, stroke, weight gain, impaired cognition, daytime somnolence, and motor vehicle accidents. Advised to abstain from driving or operating heavy machinery when drowsy and the use of respiratory suppressants. Discussed diagnostic studies and potential treatment plan. 2.  Will evaluate for PAP clinical benefit and and compliance during a 30 day period within the preceding 90 days PRN. 3.  The following educational material has been included in this visit after visit summary for your review: STEPHANIE/PAP guidelines-Discussed with the patient and all questions fully answered. 4.  Continue PAP therapy and use consistently. She has improved greatly. The patient voices understanding and recognizes the need for adherence to the prescribed therapy  5. Try the nasal mask with the chin strap  6.   Follow up in 2 months or with Dr. Alexey Rivers as she reports that she has been previously scheduled with him

## 2022-05-04 NOTE — PATIENT INSTRUCTIONS
Patient education: Sleep apnea in adults       INTRODUCTION  Normally during sleep, air moves through the throat and in and out of the lungs at a regular rhythm. In a person with sleep apnea, air movement is periodically diminished or stopped. There are two types of sleep apnea: obstructive sleep apnea and central sleep apnea. In obstructive sleep apnea, breathing is abnormal because of narrowing or closure of the throat. In central sleep apnea, breathing is abnormal because of a change in the breathing control and rhythm. Sleep apnea is a serious condition that can affect a person's ability to safely perform normal daily activities and can affect long term health. Approximately 25 percent of adults are at risk for sleep apnea of some degree. Men are more commonly affected than women. Other risk factors include middle and older age, being overweight or obese, and having a small mouth and throat. This topic review focuses on the most common type of sleep apnea in adults, obstructive sleep apnea (STEPHANIE). HOW SLEEP APNEA OCCURS  The throat is surrounded by muscles that control the airway for speaking, swallowing, and breathing. During sleep, these muscles are less active, and this causes the throat to narrow. In most people, this narrowing does not affect breathing. In others, it can cause snoring, sometimes with reduced or completely blocked airflow. A completely blocked airway without airflow is called an obstructive apnea. Partial obstruction with diminished airflow is called a hypopnea. A person may have apnea and hypopnea during sleep. Insufficient breathing due to apnea or hypopnea causes oxygen levels to fall and carbon dioxide to rise. Because the airway is blocked, breathing faster or harder does not help to improve oxygen levels until the airway is reopened. Typically, the obstruction requires the person to awaken to activate the upper airway muscles.  Once the airway is opened, the person then takes several deep breaths to catch up on breathing. As the person awakens, he or she may move briefly, snort or snore, and take a deep breath. Less frequently, a person may awaken completely with a sensation of gasping, smothering, or choking. If the person falls back to sleep quickly, he or she will not remember the event. Many people with sleep apnea are unaware of their abnormal breathing in sleep, and all patients underestimate how often their sleep is interrupted. Awakening from sleep causes sleep to be unrefreshing and causes fatigue and daytime sleepiness. Anatomic causes of obstructive sleep apnea   Most patients have STEPHANIE because of a small upper airway. As the bones of the face and skull develop, some people develop a small lower face, a small mouth, and a tongue that seems too large for the mouth. These features are genetically determined, which explains why STEPHANIE tends to cluster in families. Obesity is another major factor. Tonsil enlargement can be an important cause, especially in children. SLEEP APNEA SYMPTOMS  The main symptoms of STEPHANIE are loud snoring, fatigue, and daytime sleepiness. However, some people have no symptoms. For example, if the person does not have a bed partner, he or she may not be aware of the snoring. Fatigue and sleepiness have many causes and are often attributed to overwork and increasing age. As a result, a person may be slow to recognize that they have a problem. A bed partner or spouse often prompts the patient to seek medical care. Other symptoms may include one or more of the following:  ?Restless sleep  ? Awakening with choking, gasping, or smothering  ? Morning headaches, dry mouth, or sore throat  ? Waking frequently to urinate  ? Awakening unrested, groggy  ? Low energy, difficulty concentrating, memory impairment    Risk factors  Certain factors increase the risk of sleep apnea.   ?Increasing age [de-identified] STEPHANIE occurs at all ages, but it is more common in middle and older age adults. ?Male sex  STEPHANIE is two times more common in men, especially in middle age. ?Obesity  The more obese a person is, the more likely he or she is to have STEPHANIE. ? Sedation from medication or alcohol  This interferes with the ability to awaken from sleep and can lengthen periods of apnea (no breathing), with potentially dangerous consequences. ? Abnormality of the airway. SLEEP APNEA CONSEQUENCES  Complications of sleep apnea can include daytime sleepiness and difficulty concentrating. The consequence of this is an increased risk of accidents and errors in daily activities. Studies have shown that people with severe STEPHANIE are more than twice as likely to be involved in a motor vehicle accident as people without these conditions. People with STEPHANIE are encouraged to discuss options for driving, working, and performing other high-risk tasks with a healthcare provider. In addition, people with untreated STEPHANIE may have an increased risk of cardiovascular problems such as high blood pressure, heart attack, abnormal heart rhythms, or stroke. This risk may be due to changes in the heart rate and blood pressure that occur during sleep. SLEEP APNEA DIAGNOSIS  The diagnosis of STEPHANIE is best made by a knowledgeable sleep medicine specialist who has an understanding of the individual's health issues. The diagnosis is usually based upon the person's medical history, physical examination, and testing, including:  ? A complaint of snoring and ineffective sleep  ? Neck size (greater than 16 inches in men or 14 inches in women) is associated with an increased risk of sleep apnea  ? A small upper airway: difficulty seeing the throat because of a tongue that is large for the mouth  ? High blood pressure, especially if it is resistant to treatment  ? If a bed partner has observed the patient during episodes of stopped breathing (apnea), choking, or gasping during sleep, there is a strong possibility of sleep apnea.     Testing is usually performed in a sleep laboratory. A full sleep study is called a polysomnogram. The polysomnogram measures the breathing effort and airflow, blood oxygen level, heart rate and rhythm, duration of the various stages of sleep, body position, and movement of the arms/legs. Home monitoring devices are available that can perform a sleep study. This is a reasonable alternative to conventional testing in a sleep laboratory if the clinician strongly suspects moderate or severe sleep apnea and the patient does not have other illnesses or sleep disorders that may interfere with the results. SLEEP APNEA TREATMENT  Sleep apnea is best treated by a knowledgeable sleep medicine specialist. The goal of treatment is to maintain an open airway during sleep. Effective treatment will eliminate the symptoms of sleep disturbance; long-term health consequences are also reduced. Most treatments require nightly use. The challenge for the clinician and the patient is to select an effective therapy that is appropriate for the patient's problem and that is acceptable for long term use. Auto-titrating CPAP delivers an amount of PAP that varies during the night. The variation is dependent on event detection software algorithms, which will increase the pressure gradually in response to flow changes until adequate patency is detected. After a period of sustained upper airway patency, the delivered level of pressure gradually decreases until the algorithm identifies recurrent upper airway obstruction, at which point the delivered pressure again increases. The result is that the delivered pressure varies throughout the night, in an effort to provide the lowest pressure that is necessary to maintain upper airway patency. Continuous positive airway pressure (CPAP)  The most effective treatment for sleep apnea uses air pressure from a mechanical device to keep the upper airway open during sleep.  A CPAP (continuous positive airway pressure)  device uses an air-tight attachment to the nose, typically a mask, connected to a tube and a blower which generates the pressure. Devices that fit comfortably into the nasal opening, rather than over the nose, are also available. CPAP should be used any time the person sleeps (day or night). The CPAP device is usually used for the first time in the sleep lab, where a technician can adjust the pressure and select the best equipment to keep the airway open. Alternatively, an auto device with a self-adjusting pressure feature, provided with proper education and training, can get treatment started without another sleep test. While the treatment may seem uncomfortable, noisy, or bulky at first, most people accept the treatment after experiencing better sleep. However, difficulty with mask comfort and nasal congestion prevent up to 50 percent of people from using the treatment on a regular basis. Continued follow up with a healthcare provider helps to ensure that the treatment is effective and comfortable. Information from the CPAP machine is often used by physicians, therapists, and insurers to track the success of treatment. CPAP can be delivered with different features to improve comfort and solve problems that may come up during treatment. Changes in treatment may be needed if symptoms do not improve or if the persons condition changes, such as a gain or loss of weight. Adjust sleep position  Adjusting sleep position (to stay off the back) may help improve sleep quality in people who have STEPHANIE when sleeping on the back. However, this is difficult to maintain throughout the night and is rarely an adequate solution. Weight loss  Weight loss may be helpful for obese or overweight patients. Weight loss may be accomplished with dietary changes, exercise, and/or surgical treatment.  However, it can be difficult to maintain weight loss; the five-year success of non-surgical weight loss is only 5 percent, meaning that 95 percent of people regain lost weight. Avoid alcohol and other sedatives  Alcohol can worsen sleepiness, potentially increasing the risk of accidents or injury. People with STEPHANIE are often counseled to drink little to no alcohol, even during the daytime. Similarly, people who take anti-anxiety medications or sedatives to sleep should speak with their healthcare provider about the safety of these medications. People with STEPHANIE must notify all healthcare providers, including surgeons, about their condition and the potential risks of being sedated. People with STEPHANIE who are given anesthesia and/or pain medications require special management and close monitoring to reduce the risk of a blocked airway. Dental devices  A dental device, called an oral appliance or mandibular advancement device, can reposition the jaw (mandible), bringing the tongue and soft palate forward as well. This may relieve obstruction in some people. This treatment is excellent for reducing snoring, although the effect on STEPHANIE is sometimes more limited. As a result, dental devices are best used for mild cases of STEPHANIE when relief of snoring is the main goal. Failure to tolerate and accept CPAP is another indication for dental devices. While dental devices are not as effective as CPAP for STEPHANIE, some patients prefer a dental device to CPAP. Side effects of dental devices are generally minor but may include changes to the bite with prolonged use. Surgical treatment  Surgery is an alternative therapy for patients who cannot tolerate or do not improve with nonsurgical treatments such as CPAP or oral devices. Surgery can also be used in combination with other nonsurgical treatments. Surgical procedures reshape structures in the upper airways or surgically reposition bone or soft tissue. Uvulopalatopharyngoplasty (UPPP) removes the uvula and excessive tissue in the throat, including the tonsils, if present.  Other procedures, such as maxillomandibular advancement (MMA), address both the upper and lower pharyngeal airway more globally. UPPP alone has limited success rates (less than 50 percent) and people can relapse (when STEPHANIE symptoms return after surgery). As a result, this surgery is only recommended in a minority of people and should be considered with caution. MMA may have a higher success rate, particularly in people with abnormal jaw (maxilla and mandible) anatomy, but it is the most complicated procedure. A newer surgical approach, nerve stimulation to protrude the tongue, has promising success rates in very selected people. Tracheostomy creates a permanent opening in the neck. It is reserved for people with severe disease in whom less drastic measures have failed or are inappropriate. Although it is always successful in eliminating obstructive sleep apnea, tracheostomy requires significant lifestyle changes and carries some serious risks (eg, infection, bleeding, blockage). All surgical treatments require discussions about the goals of treatment, the expected outcomes, and potential complications. Hypoglossal nerve stimulator- \"Inspire\" device    PAP treatment failure:  Possible causes of treatment failure include nonadherence or suboptimal adherence, weight gain, an inappropriate level of prescribed positive pressure, or an additional disorder causing sleepiness (eg, narcolepsy) that may require alterations in the therapeutic regimen. A review of medications should also be undertaken since many drugs may lead to sleepiness. Inadequate sleep time may also negate the expected effects from treatment of STEPHANIE. Also, pt's can have persistent hypersomnolence associated with sleep apnea even in the presence of adequate therapy and at those times Provigil or Nuvigil or other stimulants may be indicated.     Once the patient's positive airway pressure therapy has been optimized and symptoms resolved, a regimen of long-term follow-up should be established. Annual visits are reasonable, with more frequent visits in between if new issues arise. The purpose of long-term follow-up is to assess usage and monitor for recurrent STEPHNAIE, new side effects, air leakage, and fluctuations in body weight. WHERE TO GET MORE INFORMATION  Your healthcare provider is the best source of information for questions and concerns related to your medical problem. Organizations  American Sleep [employer]nea Association  Provides information about sleep apnea to the public, publishes a newsletter, and serves as an advocate for people with the disorder. [place], [address]   Candi@Mango. org   [employer].. org   Tel: [phone]   Fax: [facility] that works to [employer] and safety by promoting public understanding of sleep and sleep disorders. Supports sleep-related education, research, and advocacy; produces and distributes educational materials to the public and healthcare professionals; and offers postdoctoral fellowships and grants for sleep researchers. [address]   Enrike@Mango. org   [employer].[email or web address]. org   Tel: [phone]   Fax: [phone]    Important information:  Medicare/private insurance CPAP/BiPAP/APAP requirements:  Medicare/private insurance has specific requirements for PAP compliance that must be met during the first 90 days of use to continue coverage for CPAP/BiPAP/APAP  from day 91 and beyond. The policy requires that patients use a PAP device 4 hours per 24 hour period, at least 70% of the time over a 30 day period. This data must be downloaded as a report direct from the PAP devices. This is called a compliance download. Your PAP supplier will assist you in this matter.      Note:  Where applicable, we will utilize PAP device efficiency reports, additional testing, and face-to-face  clinical evaluation subsequent to any treatment, changes in treatment, and continued treatment. Caution:  Please abstain from driving or engaging in other activities which may be hazardous in the presence of diminished alertness or daytime drowsiness. And avoid the use of sedatives or alcohol, which can worsen sleep apnea and daytime drowsiness. Mask suggestions:  -     Resmed Airfit N20 (Nasal) or F20 (Full face mask). They conform to your face, thus decreasing the potential for mask leakage. You might like the AirTouch F20(full face mask). It has a \"memory foam\" like cushion. The AirFit F30 is a smaller style full face mask designed to sit low on and cover less of your face for fewer facial marks. AirFit N30i has a top of the head tube with a nasal mask. AirFit P10 reported to be the most comfortable nasal pillow mask. Resmed Mirage FX reported to be the most comfortable nasal mask. Resmed Mirage [place] reported to be the most comfortable hybrid mask. AirTouch N20-memory foam nasal mask. Respironics: You might also like to try a nasal mask called a Dreamwear nasal mask or the Dreamwear nasal pillow. Another suggestion is the [facility], it is a minimal contact full face mask. The Virlinda Listen incredible under the nose design makes it the only full face mask that won't cause red marks on the bridge of your nose when compared to other full face masks. The Dreamwear full face mask has a  soft feel, unique in-frame air-flow, and innovative air tube connection at the top of the head for the ultimate in sleep comfort. Comfort Gel Blue. Dreamwear gel pillows. Brian & Cassie: Brevida nasal pillow mask and Simplus FFM    The use of a memory foam CPAP pillow supports the head and neck throughout the night.

## 2022-07-07 ENCOUNTER — OFFICE VISIT (OUTPATIENT)
Dept: NEUROLOGY | Age: 72
End: 2022-07-07
Payer: MEDICARE

## 2022-07-07 VITALS
OXYGEN SATURATION: 98 % | SYSTOLIC BLOOD PRESSURE: 130 MMHG | WEIGHT: 158 LBS | DIASTOLIC BLOOD PRESSURE: 78 MMHG | HEIGHT: 61 IN | BODY MASS INDEX: 29.83 KG/M2 | HEART RATE: 64 BPM

## 2022-07-07 DIAGNOSIS — Z78.9 DIFFICULTY USING CONTINUOUS POSITIVE AIRWAY PRESSURE (CPAP) DEVICE: ICD-10-CM

## 2022-07-07 DIAGNOSIS — G47.33 OBSTRUCTIVE SLEEP APNEA: Primary | ICD-10-CM

## 2022-07-07 PROCEDURE — G8399 PT W/DXA RESULTS DOCUMENT: HCPCS | Performed by: PHYSICIAN ASSISTANT

## 2022-07-07 PROCEDURE — 3017F COLORECTAL CA SCREEN DOC REV: CPT | Performed by: PHYSICIAN ASSISTANT

## 2022-07-07 PROCEDURE — G8427 DOCREV CUR MEDS BY ELIG CLIN: HCPCS | Performed by: PHYSICIAN ASSISTANT

## 2022-07-07 PROCEDURE — 1090F PRES/ABSN URINE INCON ASSESS: CPT | Performed by: PHYSICIAN ASSISTANT

## 2022-07-07 PROCEDURE — 99213 OFFICE O/P EST LOW 20 MIN: CPT | Performed by: PHYSICIAN ASSISTANT

## 2022-07-07 PROCEDURE — 1036F TOBACCO NON-USER: CPT | Performed by: PHYSICIAN ASSISTANT

## 2022-07-07 PROCEDURE — G8417 CALC BMI ABV UP PARAM F/U: HCPCS | Performed by: PHYSICIAN ASSISTANT

## 2022-07-07 PROCEDURE — 1123F ACP DISCUSS/DSCN MKR DOCD: CPT | Performed by: PHYSICIAN ASSISTANT

## 2022-07-07 NOTE — PROGRESS NOTES
[facility]  [address] [place] [address] [address], [address]  [place]  Phone [phone]  Fax [phone]       [facility] Sleep Follow Up Encounter      Information:   Patient Name: Naa Kinney  :   1950  Age:   81 y.o. MRN:   [id number]  Account #:  [de-identified]  Today:                22    Provider:  Mikie Parker PA-C    Chief Complaint   Patient presents with    Sleep Apnea     follow up         Subjective:   Naa Kinney is a 81 y.o. female  with a history of STEPHANIE who comes in for a sleep clinic follow up. She was diagnosed with STEPHANIE last year. She was referred for an HST due to snoring and excessive daytime somnolence. The HST,  showed an AHI of 15.0. She is prescribed auto CPAP with a pressure range of 8cm to 16cm. She has had difficulty with CPAP compliance. She has only used CPAP 24/30 days with 33% of those days >=4 hours. She is making attempts to use it longer. She was unable to use it when her asthma acted up. She feels much improved when she uses it >4 hours. She has started using a FFM. She is aware of leaks. She had trouble with the nasal mask. She may be having a right hip replacement soon.        Objective:     Past Medical History:   Diagnosis Date    Anemia     Anxiety     Asthma     CAD (coronary artery disease)     CAD (coronary artery disease)     Cervical stenosis of spine     Chest tightness or pressure 2014  lexiscan Positive for inferior apical myocardial ischemia, EF 62%, 2% ischemic myocardium on stress, low risk findings, AUC indication 15, AUC score 4     Chronic back pain     Chronic kidney disease, stage III (moderate) (HCC)     GFR 40 mL/minute 7/10/15. no problems now    CPAP (continuous positive airway pressure) dependence     Gastritis     History of blood transfusion     Hyperlipidemia 2015    Hypertension     IBS (irritable bowel syndrome)     Intracranial hemorrhage ([facility] [address] 2020    STEPHANIE (obstructive sleep apnea)     Osteoarthritis     Osteoporosis 08/2015    PONV (postoperative nausea and vomiting)     Torn tendon     LT HIP    Vitamin D deficiency        Past Surgical History:   Procedure Laterality Date    ANKLE SURGERY Right     Right ankle fusion.  APPENDECTOMY      BACK SURGERY      BREAST BIOPSY Right 2003    b9    BREAST REDUCTION SURGERY Bilateral 1991    CARDIAC CATHETERIZATION  2/2/14  JDT    EF 50%    CARDIAC CATHETERIZATION  01/2016    Normal EF, mild nonocclusive CAD    CERVICAL FUSION      The [address] [place] [address], Dr. Hugo Acosta.  CHOLECYSTECTOMY      COLONOSCOPY      DILATATION, ESOPHAGUS      ENDOSCOPY, COLON, DIAGNOSTIC      FRACTURE SURGERY      GASTRIC FUNDOPLICATION  2436    HERNIA REPAIR      SABINE    HYSTERECTOMY (CERVIX STATUS UNKNOWN)      total    JOINT REPLACEMENT      bilateral knees and hip    NASAL SINUS SURGERY N/A 3/11/2020    NASAL ENDOSCOPY, REPAIR OF NASAL SEPTUM, PARTIAL ETHMOIDECTOMY, CAUTERY TURBINATE MUCOSA, INTRAMURAL, THERAPEUTIC FRACTURE INFERIOR TURBINATES, OPEN MAXILLARY SINUS performed by Kit Portillo MD at [address] TOTAL HIP ARTHROPLASTY  11/2014    TOTAL KNEE ARTHROPLASTY Bilateral     TOTAL KNEE ARTHROPLASTY Right 2002    Total knee replacement, right knee revision, Dr. Chance Antonio 2002.         Recent Hospitalizations  ·     Significant Injuries  ·     Family History   Problem Relation Age of Onset    Kidney Disease Mother     High Blood Pressure Mother     Other Mother         AAA    Heart Disease Father     Kidney Disease Father     Brain Cancer Brother     Stroke Paternal Grandfather        Social History  Social History     Tobacco Use   Smoking Status Passive Smoke Exposure - Never Smoker   Smokeless Tobacco Never Used     Social History     Substance and Sexual Activity   Alcohol Use No     Social History     Substance and Sexual Activity   Drug Use No         Current Outpatient [valsartan], Lipitor [atorvastatin], and Demeclocycline hcl    REVIEW OF SYSTEMS     Constitutional: []? Fever []? Sweats []? Chills []? Recent Injury   [x]? Denies all unless marked  HENT:[]? Headache  []? Head Injury  []? Sore Throat  []? Ear Pain  []? Dizziness []? Hearing Loss   [x]? Denies all unless marked  Musculoskeletal: []? Arthralgia  []? Myalgias []? Muscle cramps  []? Muscle twitches   [x]? Denies all unless marked   Spine:  []? Neck pain  []? Back pain  []? Sciaticia  [x]? Denies all unless marked  Neurological:[]? Visual Disturbance []? Double Vision []? Slurred Speech []? Trouble swallowing  []? Vertigo []? Tingling []? Numbness []? Weakness []? Loss of Balance   []? Loss of Consciousness []? Memory Loss []? Seizures  [x]? Denies all unless marked  Psychiatric/Behavioral:[]? Depression []? Anxiety  [x]? Denies all unless marked  Sleep: []? Insomnia []? Sleep Disturbance []? Snoring []? Restless Legs []? Daytime Sleepiness [x]? Sleep Apnea  []? Denies all unless marked    The MA has completed the ROS with the patient. I have reviewed it in its' entirety with the patient and agree with the documentation. PHYSICAL EXAM  /78   Pulse 64   Ht 5' 1\" (1.549 m)   Wt 158 lb (71.7 kg)   SpO2 98%   BMI 29.85 kg/m²     Constitutional   Alert in NAD, well developed, pleasant and cooperative with exam  HEENT- Conjunctiva normal.  No scars, masses, or lesions over external nose or ears, hearing intact, no neck masses noted, no jugular vein distension, no bruit  Cardiac- Regular rate and rhythm  Pulmonary- Occasional wheeze; good expansion, normal effort without use of accessory muscles  Musculoskeletal  No significant wasting of muscles noted; s/p surgical changes in the right lower extremity  Extremities - No clubbing, cyanosis; 1+ LLE and 2+ RLE  Skin  Warm, dry, and intact.   No rash, erythema, or pallor  Psychiatric  Mood, affect, and behavior appear normal      Neurological exam  Awake, alert, Advised to abstain from driving or operating heavy machinery when drowsy and the use of respiratory suppressants. Discussed diagnostic studies and potential treatment plan. 2.  Will evaluate for PAP clinical benefit and and compliance during a 30 day period within the preceding 90 days PRN. 3.  The following educational material has been included in this visit after visit summary for your review: STEPHANIE/PAP guidelines-Discussed with the patient and all questions fully answered. 4.  Continue PAP  therapy. Increase hours of usage. Address leaks. The patient voices understanding and recognizes the need for adherence to the prescribed therapy  5.   Follow up with Dr. Yaya Orellana; may need to change the appointment date

## 2022-07-07 NOTE — PATIENT INSTRUCTIONS
Patient education: Sleep apnea in adults       INTRODUCTION  Normally during sleep, air moves through the throat and in and out of the lungs at a regular rhythm. In a person with sleep apnea, air movement is periodically diminished or stopped. There are two types of sleep apnea: obstructive sleep apnea and central sleep apnea. In obstructive sleep apnea, breathing is abnormal because of narrowing or closure of the throat. In central sleep apnea, breathing is abnormal because of a change in the breathing control and rhythm. Sleep apnea is a serious condition that can affect a person's ability to safely perform normal daily activities and can affect long term health. Approximately 25 percent of adults are at risk for sleep apnea of some degree. Men are more commonly affected than women. Other risk factors include middle and older age, being overweight or obese, and having a small mouth and throat. This topic review focuses on the most common type of sleep apnea in adults, obstructive sleep apnea (STEPHANIE). HOW SLEEP APNEA OCCURS  The throat is surrounded by muscles that control the airway for speaking, swallowing, and breathing. During sleep, these muscles are less active, and this causes the throat to narrow. In most people, this narrowing does not affect breathing. In others, it can cause snoring, sometimes with reduced or completely blocked airflow. A completely blocked airway without airflow is called an obstructive apnea. Partial obstruction with diminished airflow is called a hypopnea. A person may have apnea and hypopnea during sleep. Insufficient breathing due to apnea or hypopnea causes oxygen levels to fall and carbon dioxide to rise. Because the airway is blocked, breathing faster or harder does not help to improve oxygen levels until the airway is reopened. Typically, the obstruction requires the person to awaken to activate the upper airway muscles.  Once the airway is opened, the person then takes several deep breaths to catch up on breathing. As the person awakens, he or she may move briefly, snort or snore, and take a deep breath. Less frequently, a person may awaken completely with a sensation of gasping, smothering, or choking. If the person falls back to sleep quickly, he or she will not remember the event. Many people with sleep apnea are unaware of their abnormal breathing in sleep, and all patients underestimate how often their sleep is interrupted. Awakening from sleep causes sleep to be unrefreshing and causes fatigue and daytime sleepiness. Anatomic causes of obstructive sleep apnea   Most patients have STEPHANIE because of a small upper airway. As the bones of the face and skull develop, some people develop a small lower face, a small mouth, and a tongue that seems too large for the mouth. These features are genetically determined, which explains why STEPHANIE tends to cluster in families. Obesity is another major factor. Tonsil enlargement can be an important cause, especially in children. SLEEP APNEA SYMPTOMS  The main symptoms of STEPHANIE are loud snoring, fatigue, and daytime sleepiness. However, some people have no symptoms. For example, if the person does not have a bed partner, he or she may not be aware of the snoring. Fatigue and sleepiness have many causes and are often attributed to overwork and increasing age. As a result, a person may be slow to recognize that they have a problem. A bed partner or spouse often prompts the patient to seek medical care. Other symptoms may include one or more of the following:  ?Restless sleep  ? Awakening with choking, gasping, or smothering  ? Morning headaches, dry mouth, or sore throat  ? Waking frequently to urinate  ? Awakening unrested, groggy  ? Low energy, difficulty concentrating, memory impairment    Risk factors  Certain factors increase the risk of sleep apnea.   ?Increasing age [de-identified] STEPHANIE occurs at all ages, but it is more common in middle and older age adults. ?Male sex  STEPHANIE is two times more common in men, especially in middle age. ?Obesity  The more obese a person is, the more likely he or she is to have STEPHANIE. ? Sedation from medication or alcohol  This interferes with the ability to awaken from sleep and can lengthen periods of apnea (no breathing), with potentially dangerous consequences. ? Abnormality of the airway. SLEEP APNEA CONSEQUENCES  Complications of sleep apnea can include daytime sleepiness and difficulty concentrating. The consequence of this is an increased risk of accidents and errors in daily activities. Studies have shown that people with severe STEPHANIE are more than twice as likely to be involved in a motor vehicle accident as people without these conditions. People with STEPHANIE are encouraged to discuss options for driving, working, and performing other high-risk tasks with a healthcare provider. In addition, people with untreated STEPHANIE may have an increased risk of cardiovascular problems such as high blood pressure, heart attack, abnormal heart rhythms, or stroke. This risk may be due to changes in the heart rate and blood pressure that occur during sleep. SLEEP APNEA DIAGNOSIS  The diagnosis of STEPHANIE is best made by a knowledgeable sleep medicine specialist who has an understanding of the individual's health issues. The diagnosis is usually based upon the person's medical history, physical examination, and testing, including:  ? A complaint of snoring and ineffective sleep  ? Neck size (greater than 16 inches in men or 14 inches in women) is associated with an increased risk of sleep apnea  ? A small upper airway: difficulty seeing the throat because of a tongue that is large for the mouth  ? High blood pressure, especially if it is resistant to treatment  ? If a bed partner has observed the patient during episodes of stopped breathing (apnea), choking, or gasping during sleep, there is a strong possibility of sleep apnea.     Testing is usually performed in a sleep laboratory. A full sleep study is called a polysomnogram. The polysomnogram measures the breathing effort and airflow, blood oxygen level, heart rate and rhythm, duration of the various stages of sleep, body position, and movement of the arms/legs. Home monitoring devices are available that can perform a sleep study. This is a reasonable alternative to conventional testing in a sleep laboratory if the clinician strongly suspects moderate or severe sleep apnea and the patient does not have other illnesses or sleep disorders that may interfere with the results. SLEEP APNEA TREATMENT  Sleep apnea is best treated by a knowledgeable sleep medicine specialist. The goal of treatment is to maintain an open airway during sleep. Effective treatment will eliminate the symptoms of sleep disturbance; long-term health consequences are also reduced. Most treatments require nightly use. The challenge for the clinician and the patient is to select an effective therapy that is appropriate for the patient's problem and that is acceptable for long term use. Auto-titrating CPAP delivers an amount of PAP that varies during the night. The variation is dependent on event detection software algorithms, which will increase the pressure gradually in response to flow changes until adequate patency is detected. After a period of sustained upper airway patency, the delivered level of pressure gradually decreases until the algorithm identifies recurrent upper airway obstruction, at which point the delivered pressure again increases. The result is that the delivered pressure varies throughout the night, in an effort to provide the lowest pressure that is necessary to maintain upper airway patency. Continuous positive airway pressure (CPAP)  The most effective treatment for sleep apnea uses air pressure from a mechanical device to keep the upper airway open during sleep.  A CPAP (continuous positive airway pressure)  device uses an air-tight attachment to the nose, typically a mask, connected to a tube and a blower which generates the pressure. Devices that fit comfortably into the nasal opening, rather than over the nose, are also available. CPAP should be used any time the person sleeps (day or night). The CPAP device is usually used for the first time in the sleep lab, where a technician can adjust the pressure and select the best equipment to keep the airway open. Alternatively, an auto device with a self-adjusting pressure feature, provided with proper education and training, can get treatment started without another sleep test. While the treatment may seem uncomfortable, noisy, or bulky at first, most people accept the treatment after experiencing better sleep. However, difficulty with mask comfort and nasal congestion prevent up to 50 percent of people from using the treatment on a regular basis. Continued follow up with a healthcare provider helps to ensure that the treatment is effective and comfortable. Information from the CPAP machine is often used by physicians, therapists, and insurers to track the success of treatment. CPAP can be delivered with different features to improve comfort and solve problems that may come up during treatment. Changes in treatment may be needed if symptoms do not improve or if the persons condition changes, such as a gain or loss of weight. Adjust sleep position  Adjusting sleep position (to stay off the back) may help improve sleep quality in people who have STEPHANIE when sleeping on the back. However, this is difficult to maintain throughout the night and is rarely an adequate solution. Weight loss  Weight loss may be helpful for obese or overweight patients. Weight loss may be accomplished with dietary changes, exercise, and/or surgical treatment.  However, it can be difficult to maintain weight loss; the five-year success of non-surgical weight loss is only 5 percent, meaning that 95 percent of people regain lost weight. Avoid alcohol and other sedatives  Alcohol can worsen sleepiness, potentially increasing the risk of accidents or injury. People with STEPHANIE are often counseled to drink little to no alcohol, even during the daytime. Similarly, people who take anti-anxiety medications or sedatives to sleep should speak with their healthcare provider about the safety of these medications. People with STEPHANIE must notify all healthcare providers, including surgeons, about their condition and the potential risks of being sedated. People with STEPHANIE who are given anesthesia and/or pain medications require special management and close monitoring to reduce the risk of a blocked airway. Dental devices  A dental device, called an oral appliance or mandibular advancement device, can reposition the jaw (mandible), bringing the tongue and soft palate forward as well. This may relieve obstruction in some people. This treatment is excellent for reducing snoring, although the effect on STEPHANIE is sometimes more limited. As a result, dental devices are best used for mild cases of STEPHANIE when relief of snoring is the main goal. Failure to tolerate and accept CPAP is another indication for dental devices. While dental devices are not as effective as CPAP for STEPHANIE, some patients prefer a dental device to CPAP. Side effects of dental devices are generally minor but may include changes to the bite with prolonged use. Surgical treatment  Surgery is an alternative therapy for patients who cannot tolerate or do not improve with nonsurgical treatments such as CPAP or oral devices. Surgery can also be used in combination with other nonsurgical treatments. Surgical procedures reshape structures in the upper airways or surgically reposition bone or soft tissue. Uvulopalatopharyngoplasty (UPPP) removes the uvula and excessive tissue in the throat, including the tonsils, if present.  Other procedures, such as maxillomandibular advancement (MMA), address both the upper and lower pharyngeal airway more globally. UPPP alone has limited success rates (less than 50 percent) and people can relapse (when STEPHANIE symptoms return after surgery). As a result, this surgery is only recommended in a minority of people and should be considered with caution. MMA may have a higher success rate, particularly in people with abnormal jaw (maxilla and mandible) anatomy, but it is the most complicated procedure. A newer surgical approach, nerve stimulation to protrude the tongue, has promising success rates in very selected people. Tracheostomy creates a permanent opening in the neck. It is reserved for people with severe disease in whom less drastic measures have failed or are inappropriate. Although it is always successful in eliminating obstructive sleep apnea, tracheostomy requires significant lifestyle changes and carries some serious risks (eg, infection, bleeding, blockage). All surgical treatments require discussions about the goals of treatment, the expected outcomes, and potential complications. Hypoglossal nerve stimulator- \"Inspire\" device    PAP treatment failure:  Possible causes of treatment failure include nonadherence or suboptimal adherence, weight gain, an inappropriate level of prescribed positive pressure, or an additional disorder causing sleepiness (eg, narcolepsy) that may require alterations in the therapeutic regimen. A review of medications should also be undertaken since many drugs may lead to sleepiness. Inadequate sleep time may also negate the expected effects from treatment of STEPHANIE. Also, pt's can have persistent hypersomnolence associated with sleep apnea even in the presence of adequate therapy and at those times Provigil or Nuvigil or other stimulants may be indicated.     Once the patient's positive airway pressure therapy has been optimized and symptoms resolved, a regimen of long-term follow-up should be established. Annual visits are reasonable, with more frequent visits in between if new issues arise. The purpose of long-term follow-up is to assess usage and monitor for recurrent STEPHANIE, new side effects, air leakage, and fluctuations in body weight. WHERE TO GET MORE INFORMATION  Your healthcare provider is the best source of information for questions and concerns related to your medical problem. Organizations  [employer]  Provides information about sleep apnea to the public, publishes a newsletter, and serves as an advocate for people with the disorder. Mary, [address] [facility], [address] [facility]   [email or web address]   [employer].. org   Tel: [phone]   Fax: [facility] that works to [employer] and safety by promoting public understanding of sleep and sleep disorders. Supports sleep-related education, research, and advocacy; produces and distributes educational materials to the public and healthcare professionals; and offers postdoctoral fellowships and grants for sleep researchers. [address]   Mesfin@[employer].ly. org   [email or web address] org   Tel: [phone]   Fax: [phone]    Important information:  Medicare/private insurance CPAP/BiPAP/APAP requirements:  Medicare/private insurance has specific requirements for PAP compliance that must be met during the first 90 days of use to continue coverage for CPAP/BiPAP/APAP  from day 91 and beyond. The policy requires that patients use a PAP device 4 hours per 24 hour period, at least 70% of the time over a 30 day period. This data must be downloaded as a report direct from the PAP devices. This is called a compliance download. Your PAP supplier will assist you in this matter.      Note:  Where applicable, we will utilize PAP device efficiency reports, additional testing, and face-to-face  clinical evaluation subsequent to any treatment, changes in treatment, and continued treatment. Caution:  Please abstain from driving or engaging in other activities which may be hazardous in the presence of diminished alertness or daytime drowsiness. And avoid the use of sedatives or alcohol, which can worsen sleep apnea and daytime drowsiness. Mask suggestions:  -     Resmed Airfit N20 (Nasal) or F20 (Full face mask). They conform to your face, thus decreasing the potential for mask leakage. You might like the AirTouch F20(full face mask). It has a \"memory foam\" like cushion. The AirFit F30 is a smaller style full face mask designed to sit low on and cover less of your face for fewer facial marks. AirFit N30i has a top of the head tube with a nasal mask. AirFit P10 reported to be the most comfortable nasal pillow mask. Resmed Mirage FX reported to be the most comfortable nasal mask. Resmed Mirage Ramsey reported to be the most comfortable hybrid mask. AirTouch N20-memory foam nasal mask. Respironics: You might also like to try a nasal mask called a Dreamwear nasal mask or the Dreamwear nasal pillow. Another suggestion is the [facility], it is a minimal contact full face mask. The Izaiah Durham incredible under the nose design makes it the only full face mask that won't cause red marks on the bridge of your nose when compared to other full face masks. The Dreamwear full face mask has a  soft feel, unique in-frame air-flow, and innovative air tube connection at the top of the head for the ultimate in sleep comfort. Comfort Gel Blue. Dreamwear gel pillows. Brian & Cassie: Brevida nasal pillow mask and Simplus FFM    The use of a memory foam CPAP pillow supports the head and neck throughout the night.

## 2022-07-18 ENCOUNTER — OFFICE VISIT (OUTPATIENT)
Dept: NEUROLOGY | Age: 72
End: 2022-07-18
Payer: MEDICARE

## 2022-07-18 VITALS
BODY MASS INDEX: 28.98 KG/M2 | RESPIRATION RATE: 18 BRPM | HEART RATE: 55 BPM | HEIGHT: 61 IN | DIASTOLIC BLOOD PRESSURE: 79 MMHG | WEIGHT: 153.5 LBS | SYSTOLIC BLOOD PRESSURE: 134 MMHG

## 2022-07-18 DIAGNOSIS — G47.33 OBSTRUCTIVE SLEEP APNEA: ICD-10-CM

## 2022-07-18 DIAGNOSIS — Z86.79 HISTORY OF INTRACEREBRAL HEMORRHAGE WITHOUT RESIDUAL DEFICIT: ICD-10-CM

## 2022-07-18 DIAGNOSIS — G31.84 MILD COGNITIVE IMPAIRMENT: ICD-10-CM

## 2022-07-18 DIAGNOSIS — G31.84 MILD COGNITIVE IMPAIRMENT: Primary | ICD-10-CM

## 2022-07-18 LAB
FOLATE: 11.2 NG/ML (ref 4.8–37.3)
TSH SERPL DL<=0.05 MIU/L-ACNC: 1.45 UIU/ML (ref 0.27–4.2)
VITAMIN B-12: 406 PG/ML (ref 211–946)

## 2022-07-18 PROCEDURE — 1036F TOBACCO NON-USER: CPT | Performed by: PSYCHIATRY & NEUROLOGY

## 2022-07-18 PROCEDURE — 99214 OFFICE O/P EST MOD 30 MIN: CPT | Performed by: PSYCHIATRY & NEUROLOGY

## 2022-07-18 PROCEDURE — G8399 PT W/DXA RESULTS DOCUMENT: HCPCS | Performed by: PSYCHIATRY & NEUROLOGY

## 2022-07-18 PROCEDURE — G8417 CALC BMI ABV UP PARAM F/U: HCPCS | Performed by: PSYCHIATRY & NEUROLOGY

## 2022-07-18 PROCEDURE — G8427 DOCREV CUR MEDS BY ELIG CLIN: HCPCS | Performed by: PSYCHIATRY & NEUROLOGY

## 2022-07-18 PROCEDURE — 3017F COLORECTAL CA SCREEN DOC REV: CPT | Performed by: PSYCHIATRY & NEUROLOGY

## 2022-07-18 PROCEDURE — 1090F PRES/ABSN URINE INCON ASSESS: CPT | Performed by: PSYCHIATRY & NEUROLOGY

## 2022-07-18 PROCEDURE — 1123F ACP DISCUSS/DSCN MKR DOCD: CPT | Performed by: PSYCHIATRY & NEUROLOGY

## 2022-07-18 NOTE — PROGRESS NOTES
[facility]  [address] [place] [address] Drive, [address]  [facility] [address]  Phone [phone]  Fax [phone]     [facility] Follow Up Encounter  22 9:09 AM CDT    Information:   Patient Name: Fadi Espinoza  :   1950  Age:   81 y.o. MRN:   [id number]  Account #:  [de-identified]  Today:  22    Provider: Kar Lundy M.D. Chief Complaint:   Chief Complaint   Patient presents with    Memory Loss     New to provider       Subjective:   Fadi Espinoza is a 81 y.o. woman with a history of cardiovascular disease, cerebral hemorrhage, and obstructive sleep apnea who is following up regarding memory decline. She has noted worsening forgetfulness over the past few months. She forgets dates and days. On 3/11/2020, she underwent an extensive sinus surgery that included NASAL ENDOSCOPY, REPAIR OF NASAL SEPTUM, PARTIAL ETHMOIDECTOMY, CAUTERY TURBINATE MUCOSA, INTRAMURAL, THERAPEUTIC FRACTURE INFERIOR TURBINATES, OPEN MAXILLARY SINUS. On 3/16/2020, she had a spontaneous right frontal intrcranial hemorrhage. She was treated in [place]. She recovered. She has osteoarthritis and needs a right hip replaced. She ambulates with a wheeled walker. She resides alone in [place]. Her son lives in [place]. She is independent and manages her banking and drives. 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 She has been retired since . She has some hearing loss and wears hearing aids. She has had no headaches, fevers, head injuries, strokes, seizures.       Objective:     Past Medical History:  Past Medical History:   Diagnosis Date    Anemia     Anxiety     Asthma     CAD (coronary artery disease)     CAD (coronary artery disease)     Cervical stenosis of spine     Chest tightness or pressure 2014  lexiscan Positive for inferior apical myocardial ischemia, EF 62%, 2% ischemic myocardium on stress, low risk findings, AUC indication 15, AUC score 4     Chronic back pain     Chronic kidney disease, stage III (moderate) ([facility])     GFR 40 mL/minute 7/10/15. no problems now    CPAP (continuous positive airway pressure) dependence     Gastritis     History of blood transfusion     Hyperlipidemia 01/06/2015    Hypertension     IBS (irritable bowel syndrome)     Intracranial hemorrhage ([facility] [address] 03/2020    STEPHANIE (obstructive sleep apnea)     Osteoarthritis     Osteoporosis 08/2015    PONV (postoperative nausea and vomiting)     Torn tendon     LT HIP    Vitamin D deficiency        Past Surgical History:   Procedure Laterality Date    ANKLE SURGERY Right     Right ankle fusion. APPENDECTOMY      BACK SURGERY      BREAST BIOPSY Right 2003    b9    BREAST REDUCTION SURGERY Bilateral 1991    CARDIAC CATHETERIZATION  2/2/14  JDT    EF 50%    CARDIAC CATHETERIZATION  01/2016    Normal EF, mild nonocclusive CAD    CERVICAL FUSION      [facility], Dr. Kian Dyer. CHOLECYSTECTOMY      COLONOSCOPY      DILATATION, ESOPHAGUS      ENDOSCOPY, COLON, DIAGNOSTIC      FRACTURE SURGERY      GASTRIC FUNDOPLICATION  8171    HERNIA REPAIR      SABINE    HYSTERECTOMY (CERVIX STATUS UNKNOWN)      total    JOINT REPLACEMENT      bilateral knees and hip    NASAL SINUS SURGERY N/A 3/11/2020    NASAL ENDOSCOPY, REPAIR OF NASAL SEPTUM, PARTIAL ETHMOIDECTOMY, CAUTERY TURBINATE MUCOSA, INTRAMURAL, THERAPEUTIC FRACTURE INFERIOR TURBINATES, OPEN MAXILLARY SINUS performed by Franchesca Rey MD at [address]  11/2014    TOTAL KNEE ARTHROPLASTY Bilateral     TOTAL KNEE ARTHROPLASTY Right 2002    Total knee replacement, right knee revision, Dr. Talon Ramsay 2002. Recent Hospitalizations  None    Significant Injuries  None    Habits  Pradeep Valderrama reports that she is a non-smoker but has been exposed to tobacco smoke. She has never used smokeless tobacco. She reports that she does not drink alcohol and does not use drugs.     Family History Problem Relation Age of Onset    Kidney Disease Mother     High Blood Pressure Mother     Other Mother         AAA    Heart Disease Father     Kidney Disease Father     Brain Cancer Brother     Stroke Paternal Grandfather        Social History  Peter Hollingsworth is , lives in [address], and is retired. Medications:  Current Outpatient Medications   Medication Sig Dispense Refill    aspirin 81 MG EC tablet Take 81 mg by mouth daily      ferrous sulfate (IRON 325) 325 (65 Fe) MG tablet Take 325 mg by mouth daily (with breakfast)      amLODIPine (NORVASC) 5 MG tablet Take 1 tablet by mouth daily 90 tablet 3    SYMBICORT 160-4.5 MCG/ACT AERO Inhale 2 puffs into the lungs daily       cetirizine (ZYRTEC) 10 MG tablet Take 10 mg by mouth daily Patient takes morning and at bedtime      DULoxetine (CYMBALTA) 30 MG extended release capsule Take 30 mg by mouth daily Patient takes 3, 30 mg tablets daily      ARIPiprazole (ABILIFY) 2 MG tablet TAKE 1 TABLET BY MOUTH EVERY DAY      metoprolol tartrate (LOPRESSOR) 25 MG tablet Take 0.5 tablets by mouth daily (Patient taking differently: Take 50 mg by mouth in the morning.) 90 tablet 3    HYDROcodone-acetaminophen (NORCO)  MG per tablet Take 1 tablet by mouth every 6 hours as needed for Pain. ondansetron (ZOFRAN ODT) 8 MG TBDP disintegrating tablet Place 1 tablet under the tongue every 8 hours as needed for Nausea or Vomiting (Patient taking differently: Place 4 mg under the tongue every 8 hours as needed for Nausea or Vomiting) 6 tablet 1    cloNIDine (CATAPRES) 0.1 MG tablet Take one tablet when BP is over 170/90. May take up to 3 tablets daily as needed.  90 tablet 3    hydrochlorothiazide (HYDRODIURIL) 25 MG tablet Take 25 mg by mouth daily      irbesartan (AVAPRO) 300 MG tablet Take 300 mg by mouth daily      oxybutynin (DITROPAN) 5 MG tablet Take 5 mg by mouth daily      montelukast (SINGULAIR) 10 MG tablet Take 10 mg by mouth daily      ALPRAZolam (XANAX) 0.25 MG tablet Take 0.25 mg by mouth nightly as needed. PROVENTIL  (90 BASE) MCG/ACT inhaler Inhale 2 puffs into the lungs every 6 hours as needed        No current facility-administered medications for this visit. Allergies:   Allergies as of 07/18/2022 - Fully Reviewed 07/18/2022   Allergen Reaction Noted    Meloxicam Other (See Comments) 07/12/2016    Diovan [valsartan]  08/18/2016    Lipitor [atorvastatin]  08/20/2021    Demeclocycline hcl Rash 04/18/2013       Review of Systems:  Constitutional: negative for - chills and fever  Eyes:  negative for - visual disturbance and photophobia  HENMT: negative for - headaches and sinus pain  Respiratory: negative for - cough, hemoptysis, and shortness of breath  Cardiovascular: negative for - chest pain and palpitations  Gastrointestinal: negative for - blood in stools, constipation, diarrhea, nausea, and vomiting  Genito-Urinary: negative for - hematuria, urinary frequency, urinary urgency, and urinary retention  Musculoskeletal: positivefor - joint pain, joint stiffness, and joint swelling  Hematological and Lymphatic: negative for - bleeding problems, abnormal bruising, and swollen lymph nodes  Endocrine:  negative for - polydipsia and polyphagia  Allergy/Immunology:  negative for - rhinorrhea, sinus congestion, hives  Integument:  negative for - negative for - rash, change in moles, new or changing lesions  Psychological: negative for - anxiety and depression  Neurological: positive for - memory loss  negative for - numbness/tingling, and weakness     PHYSICAL EXAMINATION:  Vitals:  /79   Pulse 55   Resp 18   Ht 5' 1\" (1.549 m)   Wt 153 lb 8 oz (69.6 kg)   BMI 29.00 kg/m²   General appearance:  Alert, well developed, well nourished, in no distress  HEENT:  normocephalic, atraumatic, sclera appear normal, no nasal abnormalities, no rhinorrhea, Ears appear normal, oral mucous membranes are moist without erythema, trachea midline, thyroid is normal, no lymphadenopathy or neck mass. Cardiovascular:  Regular rate and rhythm without murmer. R>L LE peripheral edema, No cyanosis or clubbing. No carotid bruits. Pulmonary:  Lungs are clear to auscultation. Breathing appears normal, good expansion, normal effort without use of accessory muscles  Musculoskeletal:  Joints are osteoarthritic. The right ankle is large, swollen, and fused. Integument:  No rash, erythema, or pallor  Psychiatric:  Mood, affect, and behavior appear normal      NEUROLOGIC EXAMINATION:  Mental Status:  alert, oriented to person, place, and time. Speech:  Clear without dysarthria or dysphonia  Language:  Fluent without aphasia  Cranial Nerves:   II Visual fields are full to confrontation   III,IV, VI Extraocular movements are full   VII Facial movements are symmetrical without weakness   VIII Hearing is intact   IX,X Shoulder shrug and head rotation strength are intact   XII No tongue atrophy or fasciculations. Normal tongue protrusion. No tongue weakness  Motor:  Normal strength in both upper and lower extremities. Normal muscle tone and bulk. Deep tendon reflexes are intact and symmetrical in both upper and lower extremities. Kamara's signs are absent bilaterally. There is no ankle clonus on either side. Sensation:  Sensation is intact to light touch, temperature, and vibration in all extremities. Coordination:  Rapid alternating movements are normal in both upper and lower extremities. Finger to nose testing is unimpaired bilaterally. Gait:  Unsteady due to [address] Mental Status Exam    1. Orientation (8)  Name, address, current location (building), city, state, date (day),           month, year:          8  2. Attention (7)  Digit span: 1-2-4, 2-5-1-0, 2-7-9-6-3, 1-3-5-4-6-8, 4-6-0-8-4-5-9:        5  3. Immediate Recall (4)  \"apple\";  \"Mr. Ascencio\"; \"thee\"; \"tunnel\":       4  4.   Calculation (4) 5 x 13 (65), 65-7 (58), 58/2 (29), 29 + 11 (40):       4  5. Abstraction (3) Similarities:  orange/banana, horse/dog, table/bookcase:                          2  6. Construction (2) Draw a clock face showing 11:15:       2       Copy (2) Copy a three-dimensional cube:       2  7. Information (4) President; first president; define an [place]; # of weeks per year:               4  8. Recall (4) The four words: \"apple\", \"Mr. Ascencio\", \"thee\", \"tunnel\":       4         Total Score:   35/38   (max = 38)       Pertinent Diagnostic Studies:  CT interpretation of outside films      Impression    Right frontal intraparenchymal hemorrhage and adjacent subarachnoid   hemorrhage with 4 mm of leftward midline shift. Electronically signed by Deyanira Gregory on 3/16/2020 1:59 AM     I, Marcello Carnes MD, have reviewed the images and verify the above   interpretation on 3/16/2020 2:08 AM.     Electronically signed by  Marcello Carnes MD on 3/16/2020 2:08 AM    MRI brain with and without contrast      Impression      1. Unchanged large right frontal intraparenchymal hemorrhage   compared to yesterday. Areas of ischemia or acute blood products   within the rostrum and gyrus rectus. 2.  Irregular appearance of the proximal right A2 anterior cerebral   artery, which appears larger than would expected focally. This could   be related to adjacent vessels or blood product, however an aneurysm   could have a similar appearance, though no definite aneurysm was seen   on the outside CT angiogram and 3/15/2020. Attention in this area on   follow-up CT angiogram head with the beneficial.   3.  Associated mass effect and leftward displacement of the bilateral   anterior cerebral arteries related to edema within the right frontal   lobe.      Electronically signed by Erica Bill MD on 3/16/2020 12:13 PM     Corrie NGUYEN MD, have reviewed the images and verify the   above interpretation on 3/16/2020 1:52 PM.     Electronically signed by  Allison Carvalho Airam Guzmán MD on 3/16/2020 1:52 PM    IR cerebral angiography      Impression    1. Unremarkable diagnostic angiogram of the anterior circulation   without evidence of underlying vascular injury or other cause for the   patient's frontal hematoma. I as the attending was present for and either performed or supervised   all key portions of this case. I as the attending reviewed these   radiographic studies as well as provided this dictation. Dr. Jasmin Alfred was available as the covering physician should the need   have arisen. Anup Tolbert MD, have reviewed the images and verify the above   interpretation on 3/17/2020 4:54 PM.     Electronically signed by  Dov Brady MD on 3/17/2020 4:54 PM    Assessment:       ICD-10-CM    1. Mild cognitive impairment  G31.84       2. Obstructive sleep apnea  G47.33       3. History of intracerebral hemorrhage without residual deficit  Z86.79       She has impairments mostly in attention which could be due to lack of quality sleep, depression, medications but do not necessarily reflect a neurodegenerative process. She does have several dementia risk factors, most irreversible and others treated. Plan:   Use CPAP more during sleep. Will have you try a different mask called an [employer]. Will adjust pressures to range from 6cm to 16cm. (Your pressures do not appear to every go higher than 12cm). Will have you get an MRI head  Will have you get a few labs to assess the forgetfulness.   FU in 6 months    Electronically signed by Damaris Estrada MD on 7/18/22

## 2022-07-18 NOTE — PATIENT INSTRUCTIONS
INSTRUCTIONS:  Use CPAP more during sleep. Will have you try a different mask called an [employer]. Will adjust pressures to range from 6cm to 16cm. (Your pressures do not appear to every go higher than 12cm). Will have you get an MRI head  Will have you get a few labs to assess the forgetfulness.

## 2022-07-19 ENCOUNTER — TELEPHONE (OUTPATIENT)
Dept: NEUROLOGY | Age: 72
End: 2022-07-19

## 2022-07-19 NOTE — TELEPHONE ENCOUNTER
----- Message from Hussain Gómez MD sent at 7/18/2022  4:14 PM CDT -----  Let pt know test was normal

## 2022-07-29 ENCOUNTER — HOSPITAL ENCOUNTER (OUTPATIENT)
Dept: MRI IMAGING | Age: 72
Discharge: HOME | End: 2022-07-29
Payer: MEDICARE

## 2022-07-29 DIAGNOSIS — G31.84 MILD COGNITIVE IMPAIRMENT: ICD-10-CM

## 2022-07-29 DIAGNOSIS — Z86.79 HISTORY OF INTRACEREBRAL HEMORRHAGE WITHOUT RESIDUAL DEFICIT: ICD-10-CM

## 2022-07-29 PROCEDURE — 70551 MRI BRAIN STEM W/O DYE: CPT

## 2022-07-29 PROCEDURE — 70551 MRI BRAIN STEM W/O DYE: CPT | Performed by: RADIOLOGY

## 2022-08-01 ENCOUNTER — TELEPHONE (OUTPATIENT)
Dept: NEUROLOGY | Age: 72
End: 2022-08-01

## 2022-08-01 NOTE — TELEPHONE ENCOUNTER
----- Message from Donte Fernandes MD sent at 7/30/2022  3:57 PM CDT -----  Other than the right frontal old brain hemorrhage, MRI head was normal.

## 2022-08-01 NOTE — TELEPHONE ENCOUNTER
----- Message from Brook Puga MD sent at 7/30/2022  3:57 PM CDT -----  Other than the right frontal old brain hemorrhage, MRI head was normal.

## 2022-10-18 ENCOUNTER — HOSPITAL ENCOUNTER (OUTPATIENT)
Dept: WOMENS IMAGING | Age: 72
Discharge: HOME | End: 2022-10-18
Payer: MEDICARE

## 2022-10-18 VITALS — WEIGHT: 153 LBS | BODY MASS INDEX: 28.91 KG/M2

## 2022-10-18 DIAGNOSIS — Z12.31 BREAST CANCER SCREENING BY MAMMOGRAM: ICD-10-CM

## 2022-10-18 PROCEDURE — 77063 BREAST TOMOSYNTHESIS BI: CPT

## 2022-10-19 ENCOUNTER — TELEPHONE (OUTPATIENT)
Dept: CARDIOLOGY CLINIC | Age: 72
End: 2022-10-19

## 2022-10-20 ENCOUNTER — TELEPHONE (OUTPATIENT)
Dept: CARDIOLOGY CLINIC | Age: 72
End: 2022-10-20

## 2022-10-20 NOTE — TELEPHONE ENCOUNTER
Okay to send letter for cardiac risk stratification and  okay to hold aspirin prior to procedure and resume thereafter

## 2022-10-20 NOTE — TELEPHONE ENCOUNTER
Date: 11/7/22    Cardiologist: only NP since JDT    Procedure: right robotic SARATH    Surgeon: Jake Condon    Last Office Visit: 1/24/22  Reason for office visit and medical concerns addressed at this office visit: cad, htn, hyperlipidemia, ckd,     Testing Performed and Date of Service:  5/17/21 DSE  Dobutamine stress echocardiogram without clinical, electrocardiographic,   or echocardiographic evidence of myocardial ischemia. RCRI = 0. 9  METs 4    Current Medications: aspirin, iron, amlodipine, symbicort, zyrtec, cymbalta, abilify, metoprolol, norco, clonidine, hctz, irbesartan, oxybutynin, singulair, xanax,     Is the patient currently taking an anticoagulant?  If so, what is the diagnosis the patient has been given to warrant the need for the anticoagulant? aspirin    Additional Notes: requesting cardiac clearance and to hold aspirin

## 2022-10-21 ENCOUNTER — HOSPITAL ENCOUNTER (OUTPATIENT)
Dept: NON INVASIVE DIAGNOSTICS | Age: 72
Discharge: HOME | End: 2022-10-21
Payer: MEDICARE

## 2022-10-21 ENCOUNTER — HOSPITAL ENCOUNTER (OUTPATIENT)
Dept: LAB | Age: 72
Discharge: HOME | End: 2022-10-21
Payer: MEDICARE

## 2022-10-21 ENCOUNTER — HOSPITAL ENCOUNTER (OUTPATIENT)
Dept: GENERAL RADIOLOGY | Age: 72
Discharge: HOME | End: 2022-10-21
Payer: MEDICARE

## 2022-10-21 DIAGNOSIS — M25.511 RIGHT SHOULDER PAIN, UNSPECIFIED CHRONICITY: ICD-10-CM

## 2022-10-21 DIAGNOSIS — M79.601 RIGHT ARM PAIN: ICD-10-CM

## 2022-10-21 DIAGNOSIS — Z01.818 PREOP EXAMINATION: ICD-10-CM

## 2022-10-21 LAB
ALBUMIN SERPL-MCNC: 4.4 G/DL (ref 3.5–5.2)
ALP BLD-CCNC: 116 U/L (ref 35–104)
ALT SERPL-CCNC: 10 U/L (ref 5–33)
ANION GAP SERPL CALCULATED.3IONS-SCNC: 12 MMOL/L (ref 7–19)
AST SERPL-CCNC: 19 U/L (ref 5–32)
BASOPHILS ABSOLUTE: 0.1 K/UL (ref 0–0.2)
BASOPHILS RELATIVE PERCENT: 1 % (ref 0–1)
BILIRUB SERPL-MCNC: 0.9 MG/DL (ref 0.2–1.2)
BUN BLDV-MCNC: 14 MG/DL (ref 8–23)
CALCIUM SERPL-MCNC: 10.1 MG/DL (ref 8.8–10.2)
CHLORIDE BLD-SCNC: 94 MMOL/L (ref 98–111)
CO2: 28 MMOL/L (ref 22–29)
CREAT SERPL-MCNC: 0.9 MG/DL (ref 0.5–0.9)
EOSINOPHILS ABSOLUTE: 0.1 K/UL (ref 0–0.6)
EOSINOPHILS RELATIVE PERCENT: 0.8 % (ref 0–5)
GFR SERPL CREATININE-BSD FRML MDRD: >60 ML/MIN/{1.73_M2}
GLUCOSE BLD-MCNC: 81 MG/DL (ref 74–109)
HCT VFR BLD CALC: 36.9 % (ref 37–47)
HEMOGLOBIN: 11.8 G/DL (ref 12–16)
IMMATURE GRANULOCYTES #: 0 K/UL
LYMPHOCYTES ABSOLUTE: 2.6 K/UL (ref 1.1–4.5)
LYMPHOCYTES RELATIVE PERCENT: 24.4 % (ref 20–40)
MCH RBC QN AUTO: 32.9 PG (ref 27–31)
MCHC RBC AUTO-ENTMCNC: 32 G/DL (ref 33–37)
MCV RBC AUTO: 102.8 FL (ref 81–99)
MONOCYTES ABSOLUTE: 0.9 K/UL (ref 0–0.9)
MONOCYTES RELATIVE PERCENT: 8.6 % (ref 0–10)
MRSA SCREEN RT-PCR: NOT DETECTED
NEUTROPHILS ABSOLUTE: 6.8 K/UL (ref 1.5–7.5)
NEUTROPHILS RELATIVE PERCENT: 64.8 % (ref 50–65)
PDW BLD-RTO: 12.9 % (ref 11.5–14.5)
PLATELET # BLD: 418 K/UL (ref 130–400)
PMV BLD AUTO: 9.8 FL (ref 9.4–12.3)
POTASSIUM SERPL-SCNC: 3.2 MMOL/L (ref 3.5–5)
RBC # BLD: 3.59 M/UL (ref 4.2–5.4)
SODIUM BLD-SCNC: 134 MMOL/L (ref 136–145)
TOTAL PROTEIN: 6.9 G/DL (ref 6.6–8.7)
WBC # BLD: 10.5 K/UL (ref 4.8–10.8)

## 2022-10-21 PROCEDURE — 93005 ELECTROCARDIOGRAM TRACING: CPT | Performed by: FAMILY MEDICINE

## 2022-10-21 PROCEDURE — 87641 MR-STAPH DNA AMP PROBE: CPT

## 2022-10-21 PROCEDURE — 87086 URINE CULTURE/COLONY COUNT: CPT

## 2022-10-21 PROCEDURE — 85025 COMPLETE CBC W/AUTO DIFF WBC: CPT

## 2022-10-21 PROCEDURE — 36415 COLL VENOUS BLD VENIPUNCTURE: CPT

## 2022-10-21 PROCEDURE — 71046 X-RAY EXAM CHEST 2 VIEWS: CPT

## 2022-10-21 PROCEDURE — 80053 COMPREHEN METABOLIC PANEL: CPT

## 2022-10-21 PROCEDURE — 73030 X-RAY EXAM OF SHOULDER: CPT

## 2022-10-21 PROCEDURE — 73060 X-RAY EXAM OF HUMERUS: CPT

## 2022-10-22 LAB
EKG P AXIS: 20 DEGREES
EKG P-R INTERVAL: 152 MS
EKG Q-T INTERVAL: 392 MS
EKG QRS DURATION: 94 MS
EKG QTC CALCULATION (BAZETT): 395 MS
EKG T AXIS: -3 DEGREES

## 2022-10-22 PROCEDURE — 93010 ELECTROCARDIOGRAM REPORT: CPT | Performed by: INTERNAL MEDICINE

## 2022-10-23 LAB — URINE CULTURE, ROUTINE: NORMAL

## 2022-10-25 ENCOUNTER — OFFICE VISIT (OUTPATIENT)
Dept: CARDIOLOGY CLINIC | Age: 72
End: 2022-10-25
Payer: MEDICARE

## 2022-10-25 VITALS
WEIGHT: 152 LBS | HEART RATE: 84 BPM | OXYGEN SATURATION: 96 % | DIASTOLIC BLOOD PRESSURE: 64 MMHG | SYSTOLIC BLOOD PRESSURE: 130 MMHG | BODY MASS INDEX: 28.7 KG/M2 | HEIGHT: 61 IN

## 2022-10-25 DIAGNOSIS — Z01.810 PREOPERATIVE CARDIOVASCULAR EXAMINATION: Primary | ICD-10-CM

## 2022-10-25 DIAGNOSIS — I10 ESSENTIAL HYPERTENSION: ICD-10-CM

## 2022-10-25 DIAGNOSIS — E78.2 MIXED HYPERLIPIDEMIA: ICD-10-CM

## 2022-10-25 DIAGNOSIS — I25.10 CORONARY ARTERY DISEASE INVOLVING NATIVE CORONARY ARTERY OF NATIVE HEART WITHOUT ANGINA PECTORIS: ICD-10-CM

## 2022-10-25 PROCEDURE — G8399 PT W/DXA RESULTS DOCUMENT: HCPCS | Performed by: CLINICAL NURSE SPECIALIST

## 2022-10-25 PROCEDURE — G8417 CALC BMI ABV UP PARAM F/U: HCPCS | Performed by: CLINICAL NURSE SPECIALIST

## 2022-10-25 PROCEDURE — 1036F TOBACCO NON-USER: CPT | Performed by: CLINICAL NURSE SPECIALIST

## 2022-10-25 PROCEDURE — G8484 FLU IMMUNIZE NO ADMIN: HCPCS | Performed by: CLINICAL NURSE SPECIALIST

## 2022-10-25 PROCEDURE — 93000 ELECTROCARDIOGRAM COMPLETE: CPT | Performed by: CLINICAL NURSE SPECIALIST

## 2022-10-25 PROCEDURE — 3017F COLORECTAL CA SCREEN DOC REV: CPT | Performed by: CLINICAL NURSE SPECIALIST

## 2022-10-25 PROCEDURE — 1123F ACP DISCUSS/DSCN MKR DOCD: CPT | Performed by: CLINICAL NURSE SPECIALIST

## 2022-10-25 PROCEDURE — 99214 OFFICE O/P EST MOD 30 MIN: CPT | Performed by: CLINICAL NURSE SPECIALIST

## 2022-10-25 PROCEDURE — 3074F SYST BP LT 130 MM HG: CPT | Performed by: CLINICAL NURSE SPECIALIST

## 2022-10-25 PROCEDURE — 3078F DIAST BP <80 MM HG: CPT | Performed by: CLINICAL NURSE SPECIALIST

## 2022-10-25 PROCEDURE — 1090F PRES/ABSN URINE INCON ASSESS: CPT | Performed by: CLINICAL NURSE SPECIALIST

## 2022-10-25 PROCEDURE — G8427 DOCREV CUR MEDS BY ELIG CLIN: HCPCS | Performed by: CLINICAL NURSE SPECIALIST

## 2022-10-25 ASSESSMENT — ENCOUNTER SYMPTOMS
CHEST TIGHTNESS: 0
NAUSEA: 0
ABDOMINAL PAIN: 0
FACIAL SWELLING: 0
EYE REDNESS: 0
SHORTNESS OF BREATH: 1
VOMITING: 0
COUGH: 0
WHEEZING: 0

## 2022-10-25 NOTE — PROGRESS NOTES
[facility], [address] [facility] [address] [facility] Janice Perez, [address] [employer] [address] [id number]  Phone: [phone]  Fax: [phone]    OFFICE VISIT:  10/25/2022    [address]: 1950    Reason For Visit:  Gianfranco Bardales is a 81 y.o. female who is here for Follow-up and Cardiac Clearance      HPI  Patient is here for follow-up for  Mild to moderate, nonocclusive CAD  with the  following cardiac history:  2/2/2014  lexiscan Positive for inferior apical myocardial ischemia, EF 62%, 2% ischemic myocardium on stress, low risk findings, AUC indication 15, AUC score 4  2/2/2014  Cath  Mild to moderate distal disease, normal LVFX  1/21/2016  lexiscan  Positive for inferor lateral MI + myocardial ischemia, EF 69%, 12/11% ischemic myocardium on stress, low risk findings, AUC indication 15, AUC score 4  1/22/16  Cath  Mild to moderate distal disease, normal LVFX  5/7/21- DSE negative for ischemia    Patient follows with our office for history of mild to moderate nonobstructive coronary artery disease, hypertension, hyperlipidemia with statin intolerance. Other history includes asthma. She is here for cardiac restratification for an upcoming hip surgery     She denies any cardiac symptoms such as chest pain, unusual dyspnea, orthopnea, PND, unusual edema, palpitations. Activity is difficult currently due to her orthopedic condition    Marybel Rosenbaum DO is PCP.   Simone Bryan has the following history as recorded in [facility]:    Patient Active Problem List    Diagnosis Date Noted    CPAP (continuous positive airway pressure) dependence 05/04/2022    Myalgia due to statin 01/24/2022    Obstructive sleep apnea 10/01/2021    S/P FESS (functional endoscopic sinus surgery) 11/19/2020    Deviated nasal septum 02/06/2020    Nasal turbinate hypertrophy 02/06/2020    Chronic pansinusitis 02/06/2020    Nasal obstruction 02/06/2020    Bradycardia 12/10/2019    Difficulty using continuous positive airway pressure (CPAP) device 12/10/2018    Abnormal nuclear cardiac imaging test     Positive cardiac stress test     Essential hypertension     Hypercholesteremia     Mixed hyperlipidemia 01/06/2015    CAD (coronary artery disease)     Chest tightness or pressure 02/02/2014     Past Medical History:   Diagnosis Date    Anemia     Anxiety     Asthma     CAD (coronary artery disease)     CAD (coronary artery disease)     Cervical stenosis of spine     Chest tightness or pressure 02/02/2014 2/2/2014  lexiscan Positive for inferior apical myocardial ischemia, EF 62%, 2% ischemic myocardium on stress, low risk findings, AUC indication 15, AUC score 4     Chronic back pain     Chronic kidney disease, stage III (moderate) (HCC)     GFR 40 mL/minute 7/10/15. no problems now    CPAP (continuous positive airway pressure) dependence     Gastritis     History of blood transfusion     Hyperlipidemia 01/06/2015    Hypertension     IBS (irritable bowel syndrome)     Intracranial hemorrhage ([facility] [address] 03/2020    STEPHANIE (obstructive sleep apnea)     Osteoarthritis     Osteoporosis 08/2015    PONV (postoperative nausea and vomiting)     Torn tendon     LT HIP    Vitamin D deficiency      Past Surgical History:   Procedure Laterality Date    ANKLE SURGERY Right     Right ankle fusion. APPENDECTOMY      BACK SURGERY      BREAST BIOPSY Right 2003    b9    BREAST REDUCTION SURGERY Bilateral 1991    CARDIAC CATHETERIZATION  2/2/14  JDT    EF 50%    CARDIAC CATHETERIZATION  01/2016    Normal EF, mild nonocclusive CAD    CERVICAL FUSION      [facility] [address] [facility], Dr. Karina Carroll.     CHOLECYSTECTOMY      COLONOSCOPY      DILATATION, ESOPHAGUS      ENDOSCOPY, COLON, DIAGNOSTIC      FRACTURE SURGERY      GASTRIC FUNDOPLICATION  7225    HERNIA REPAIR      SABINE    HYSTERECTOMY (CERVIX STATUS UNKNOWN)      total    JOINT REPLACEMENT      bilateral knees and hip    NASAL SINUS SURGERY N/A 3/11/2020    NASAL ENDOSCOPY, REPAIR OF NASAL SEPTUM, PARTIAL ETHMOIDECTOMY, CAUTERY TURBINATE MUCOSA, INTRAMURAL, THERAPEUTIC FRACTURE INFERIOR TURBINATES, OPEN MAXILLARY SINUS performed by Naina Jaime MD at [address]  11/2014    TOTAL KNEE ARTHROPLASTY Bilateral     TOTAL KNEE ARTHROPLASTY Right 2002    Total knee replacement, right knee revision, Dr. Bridget Alvarado 2002. Family History   Problem Relation Age of Onset    Kidney Disease Mother     High Blood Pressure Mother     Other Mother         AAA    Heart Disease Father     Kidney Disease Father     Brain Cancer Brother     Stroke Paternal Grandfather      Social History     Tobacco Use    Smoking status: Passive Smoke Exposure - Never Smoker    Smokeless tobacco: Never   Substance Use Topics    Alcohol use: No      Current Outpatient Medications   Medication Sig Dispense Refill    aspirin 81 MG EC tablet Take 81 mg by mouth daily      amLODIPine (NORVASC) 5 MG tablet Take 1 tablet by mouth daily 90 tablet 3    SYMBICORT 160-4.5 MCG/ACT AERO Inhale 2 puffs into the lungs daily       cetirizine (ZYRTEC) 10 MG tablet Take 10 mg by mouth daily Patient takes morning and at bedtime      DULoxetine (CYMBALTA) 30 MG extended release capsule Take 30 mg by mouth daily Patient takes 3, 30 mg tablets daily      ARIPiprazole (ABILIFY) 2 MG tablet TAKE 1 TABLET BY MOUTH EVERY DAY      HYDROcodone-acetaminophen (NORCO)  MG per tablet Take 1 tablet by mouth every 6 hours as needed for Pain. ondansetron (ZOFRAN ODT) 8 MG TBDP disintegrating tablet Place 1 tablet under the tongue every 8 hours as needed for Nausea or Vomiting (Patient taking differently: Place 4 mg under the tongue every 8 hours as needed for Nausea or Vomiting) 6 tablet 1    cloNIDine (CATAPRES) 0.1 MG tablet Take one tablet when BP is over 170/90. May take up to 3 tablets daily as needed.  90 tablet 3    hydrochlorothiazide (HYDRODIURIL) 25 MG tablet Take 25 mg by mouth daily      irbesartan (AVAPRO) 300 MG tablet Take 300 mg by mouth daily      oxybutynin (DITROPAN) 5 MG tablet Take 5 mg by mouth daily      montelukast (SINGULAIR) 10 MG tablet Take 10 mg by mouth daily      ALPRAZolam (XANAX) 0.25 MG tablet Take 0.25 mg by mouth nightly as needed. PROVENTIL  (90 BASE) MCG/ACT inhaler Inhale 2 puffs into the lungs every 6 hours as needed        No current facility-administered medications for this visit. Allergies: Meloxicam, Diovan [valsartan], Lipitor [atorvastatin], and Demeclocycline hcl    Review of Systems  Review of Systems   Constitutional:  Negative for activity change, diaphoresis, fatigue, fever and unexpected weight change. HENT:  Negative for facial swelling and nosebleeds. Eyes:  Negative for redness and visual disturbance. Respiratory:  Positive for shortness of breath (mild,chronic). Negative for cough, chest tightness and wheezing. Cardiovascular:  Negative for chest pain, palpitations and leg swelling. Gastrointestinal:  Negative for abdominal pain, nausea and vomiting. Endocrine: Negative for cold intolerance and heat intolerance. Genitourinary:  Negative for dysuria and hematuria. Musculoskeletal:  Negative for arthralgias and myalgias. Unsteady gait, joint pain, chronic left hip pain   Skin:  Negative for pallor and rash. Neurological:  Negative for dizziness, seizures, syncope, weakness and light-headedness. Hematological:  Does not bruise/bleed easily. Psychiatric/Behavioral:  Negative for agitation. The patient is not nervous/anxious. Objective  Vital Signs - /64   Pulse 84   Ht 5' 1\" (1.549 m)   Wt 152 lb (68.9 kg)   SpO2 96%   BMI 28.72 kg/m²   Physical Exam  Vitals and nursing note reviewed. Constitutional:       General: She is not in acute distress. Appearance: Normal appearance. She is well-developed. She is not ill-appearing or diaphoretic. HENT:      Head: Normocephalic and atraumatic.    Eyes:      General: Right eye: No discharge. Left eye: No discharge. Pupils: Pupils are equal, round, and reactive to light. Neck:      Vascular: No carotid bruit or JVD. Trachea: No tracheal deviation. Cardiovascular:      Rate and Rhythm: Normal rate and regular rhythm. Heart sounds: Normal heart sounds. No murmur heard. No friction rub. No gallop. Comments: No carotid bruit  Pulmonary:      Effort: Pulmonary effort is normal. No respiratory distress. Breath sounds: Normal breath sounds. No wheezing or rales. Abdominal:      Palpations: Abdomen is soft. Tenderness: There is no abdominal tenderness. Musculoskeletal:         General: No swelling. Cervical back: Neck supple. No muscular tenderness. Right lower leg: Edema (2+) present. Left lower leg: Edema (1+) present. Comments: Ambulates with cane, unsteady gait   Skin:     General: Skin is warm and dry. Findings: No rash. Neurological:      Mental Status: She is alert and oriented to person, place, and time. Cranial Nerves: No cranial nerve deficit. Psychiatric:         Behavior: Behavior normal.         Judgment: Judgment normal.       Data:  DSE 5/17/21  Conclusions      Summary   Dobutamine stress echocardiogram without clinical, electrocardiographic,   or echocardiographic evidence of myocardial ischemia.       Signature      ----------------------------------------------------------------   Electronically signed by Mina Diamond MD(Interpreting physician)   on 05/17/2021 05:30 PM   ----------------------------------------------------------------  Echo 4/29/21  Conclusions      Summary   Normal left ventricular size and systolic function EF 67-31%   Mild concentric left ventricular hypertrophy with mild [grade 1] diastolic   dysfunction   Normal left atrial size   Tricuspid aortic valve with adequate cusp separation and neither stenosis   or insufficiency   Normally mobile mitral valve with trace regurgitation   Mild pulmonic insufficiency   Normal right-sided chambers with preserved RV systolic function   No tricuspid regurgitation with which to estimate RVSP   Normal IVC dimension with no inspiratory motion seen precluding exclusion   of a mildly elevated right atrial filling pressures   No significant pericardial effusion   Aortic root and ascending segments measured within normal limits      Signature      ----------------------------------------------------------------   Electronically signed by Chapincito Paul MD(Interpreting physician)   on 04/29/2021 12:25 PM    Heart Cath 1/222/16  Summary:       1. Successful femoral artery ultrasound  3. Mild to moderate coronary artery disease  4. Left ventricular function is normal    Reviewed EKG dated 10/21/2022 that shows sinus rhythm, LVH    Assessment:     Diagnosis Orders   1. Preoperative cardiovascular examination        2. Coronary artery disease involving native coronary artery of native heart without angina pectoris        3. Essential hypertension        4. Mixed hyperlipidemia            Preoperative cardiovascular exam  Cardiac risk stratification for upcoming surgery/ procedure  Relative Cardiac Risk Index = 1  Patient is able to do at least 4 METs of activity  Low risk for major adverse cardiac events     CAD-mild to moderate per heart cath 2016. Negative DSE in 2021. Stable without complaints of angina. Hypertension-stable on current regimen with amlodipine, clonidine, irbesartan    Hypercholesterolemia/statin intolerance-has tried  Crestor,  Lipitor, pravastatin, and Zetia with myalgias. She is not willing to consider other options at this time, monitored by      Plan    Return for APRN, as scheduled. Letter to Dr. Geovanni Serrano for cardiac risk stratification    Call with any questionsor concerns  Follow up with Leslie Rashid,  for non cardiac problems  Report any new problems  Cardiovascular Fitness-Exercise as tolerated.   Strive for 15 minutes of exercise most days of the week. Cardiac / HealthyDiet  Continue current medications as directed  Continue plan of treatment  It is always recommended that you bring your medicationsbottles with you to each visit - this is for your safety!        MICHELLE Hale

## 2022-10-27 ENCOUNTER — TELEPHONE (OUTPATIENT)
Dept: CARDIOLOGY CLINIC | Age: 72
End: 2022-10-27

## 2022-10-27 NOTE — TELEPHONE ENCOUNTER
Please send letter for cardiac risk stratification to Dr. Fausto Moralez.   I believe the patient said he is in [facility]

## 2022-11-04 LAB
ANION GAP SERPL CALCULATED.3IONS-SCNC: 11 MMOL/L (ref 7–19)
BUN BLDV-MCNC: 15 MG/DL (ref 8–23)
CALCIUM SERPL-MCNC: 9.9 MG/DL (ref 8.8–10.2)
CHLORIDE BLD-SCNC: 100 MMOL/L (ref 98–111)
CO2: 30 MMOL/L (ref 22–29)
CREAT SERPL-MCNC: 0.7 MG/DL (ref 0.5–0.9)
FERRITIN: 99.5 NG/ML (ref 13–150)
FOLATE: 8.9 NG/ML (ref 4.8–37.3)
GFR SERPL CREATININE-BSD FRML MDRD: >60 ML/MIN/{1.73_M2}
GLUCOSE BLD-MCNC: 91 MG/DL (ref 74–109)
IRON: 65 UG/DL (ref 37–145)
POTASSIUM SERPL-SCNC: 3.8 MMOL/L (ref 3.5–5)
SODIUM BLD-SCNC: 141 MMOL/L (ref 136–145)
TOTAL IRON BINDING CAPACITY: 226 UG/DL (ref 250–400)
VITAMIN B-12: 934 PG/ML (ref 211–946)

## 2023-02-08 ENCOUNTER — TELEPHONE (OUTPATIENT)
Dept: NEUROLOGY | Age: 73
End: 2023-02-08

## 2023-02-08 NOTE — TELEPHONE ENCOUNTER
Called and left patient a  to confirm her appointment for 02-09-23 with Dr. Lionel Alvares. And to take her SD card from her sleep machine [facility] current compliance report for her appointment.

## 2023-03-14 ENCOUNTER — TELEPHONE (OUTPATIENT)
Dept: VASCULAR SURGERY | Age: 73
End: 2023-03-14

## 2023-03-15 ENCOUNTER — TELEPHONE (OUTPATIENT)
Dept: VASCULAR SURGERY | Age: 73
End: 2023-03-15

## 2023-03-15 NOTE — TELEPHONE ENCOUNTER
Patient called to r/s Arterial Segmenta test scheduled for today. Central Scheduling states they were unable to reschedule test due to order expiring on 3/18 and they could not schedule after date until a new order has been put in. Please put new order in and call patient with new date. Thank you!

## 2023-04-03 ENCOUNTER — TELEPHONE (OUTPATIENT)
Dept: NEUROLOGY | Age: 73
End: 2023-04-03

## 2023-04-04 ENCOUNTER — OFFICE VISIT (OUTPATIENT)
Dept: NEUROLOGY | Age: 73
End: 2023-04-04
Payer: MEDICARE

## 2023-04-04 VITALS
DIASTOLIC BLOOD PRESSURE: 76 MMHG | SYSTOLIC BLOOD PRESSURE: 135 MMHG | HEIGHT: 60 IN | HEART RATE: 64 BPM | BODY MASS INDEX: 28.47 KG/M2 | WEIGHT: 145 LBS | RESPIRATION RATE: 18 BRPM

## 2023-04-04 DIAGNOSIS — G31.84 MILD COGNITIVE IMPAIRMENT: Primary | ICD-10-CM

## 2023-04-04 DIAGNOSIS — G47.33 OBSTRUCTIVE SLEEP APNEA: ICD-10-CM

## 2023-04-04 DIAGNOSIS — Z86.79 HISTORY OF INTRACEREBRAL HEMORRHAGE WITHOUT RESIDUAL DEFICIT: ICD-10-CM

## 2023-04-04 PROCEDURE — 99213 OFFICE O/P EST LOW 20 MIN: CPT | Performed by: PSYCHIATRY & NEUROLOGY

## 2023-04-04 PROCEDURE — G8399 PT W/DXA RESULTS DOCUMENT: HCPCS | Performed by: PSYCHIATRY & NEUROLOGY

## 2023-04-04 PROCEDURE — 3017F COLORECTAL CA SCREEN DOC REV: CPT | Performed by: PSYCHIATRY & NEUROLOGY

## 2023-04-04 PROCEDURE — G8417 CALC BMI ABV UP PARAM F/U: HCPCS | Performed by: PSYCHIATRY & NEUROLOGY

## 2023-04-04 PROCEDURE — 1090F PRES/ABSN URINE INCON ASSESS: CPT | Performed by: PSYCHIATRY & NEUROLOGY

## 2023-04-04 PROCEDURE — 3074F SYST BP LT 130 MM HG: CPT | Performed by: PSYCHIATRY & NEUROLOGY

## 2023-04-04 PROCEDURE — 1123F ACP DISCUSS/DSCN MKR DOCD: CPT | Performed by: PSYCHIATRY & NEUROLOGY

## 2023-04-04 PROCEDURE — 1036F TOBACCO NON-USER: CPT | Performed by: PSYCHIATRY & NEUROLOGY

## 2023-04-04 PROCEDURE — G8427 DOCREV CUR MEDS BY ELIG CLIN: HCPCS | Performed by: PSYCHIATRY & NEUROLOGY

## 2023-04-04 PROCEDURE — 3078F DIAST BP <80 MM HG: CPT | Performed by: PSYCHIATRY & NEUROLOGY

## 2023-04-19 ENCOUNTER — TELEPHONE (OUTPATIENT)
Dept: VASCULAR SURGERY | Age: 73
End: 2023-04-19

## 2023-04-20 ENCOUNTER — HOSPITAL ENCOUNTER (OUTPATIENT)
Dept: NON INVASIVE DIAGNOSTICS | Age: 73
Discharge: HOME | End: 2023-04-20
Payer: MEDICARE

## 2023-04-20 ENCOUNTER — OFFICE VISIT (OUTPATIENT)
Dept: VASCULAR SURGERY | Age: 73
End: 2023-04-20
Payer: MEDICARE

## 2023-04-20 VITALS
DIASTOLIC BLOOD PRESSURE: 78 MMHG | HEART RATE: 66 BPM | SYSTOLIC BLOOD PRESSURE: 110 MMHG | TEMPERATURE: 98.4 F | OXYGEN SATURATION: 97 %

## 2023-04-20 DIAGNOSIS — I73.9 PVD (PERIPHERAL VASCULAR DISEASE) (HCC): Primary | ICD-10-CM

## 2023-04-20 DIAGNOSIS — I73.9 PVD (PERIPHERAL VASCULAR DISEASE) (HCC): ICD-10-CM

## 2023-04-20 PROCEDURE — 3074F SYST BP LT 130 MM HG: CPT | Performed by: PHYSICIAN ASSISTANT

## 2023-04-20 PROCEDURE — G8417 CALC BMI ABV UP PARAM F/U: HCPCS | Performed by: PHYSICIAN ASSISTANT

## 2023-04-20 PROCEDURE — G8399 PT W/DXA RESULTS DOCUMENT: HCPCS | Performed by: PHYSICIAN ASSISTANT

## 2023-04-20 PROCEDURE — 1036F TOBACCO NON-USER: CPT | Performed by: PHYSICIAN ASSISTANT

## 2023-04-20 PROCEDURE — 93923 UPR/LXTR ART STDY 3+ LVLS: CPT

## 2023-04-20 PROCEDURE — 3017F COLORECTAL CA SCREEN DOC REV: CPT | Performed by: PHYSICIAN ASSISTANT

## 2023-04-20 PROCEDURE — G8427 DOCREV CUR MEDS BY ELIG CLIN: HCPCS | Performed by: PHYSICIAN ASSISTANT

## 2023-04-20 PROCEDURE — 99213 OFFICE O/P EST LOW 20 MIN: CPT | Performed by: PHYSICIAN ASSISTANT

## 2023-04-20 PROCEDURE — 1123F ACP DISCUSS/DSCN MKR DOCD: CPT | Performed by: PHYSICIAN ASSISTANT

## 2023-04-20 PROCEDURE — 1090F PRES/ABSN URINE INCON ASSESS: CPT | Performed by: PHYSICIAN ASSISTANT

## 2023-04-20 PROCEDURE — 3078F DIAST BP <80 MM HG: CPT | Performed by: PHYSICIAN ASSISTANT

## 2023-04-24 ENCOUNTER — HOSPITAL ENCOUNTER (INPATIENT)
Age: 73
LOS: 2 days | Discharge: HOME HEALTH | DRG: 467 | End: 2023-04-26
Attending: EMERGENCY MEDICINE
Payer: MEDICARE

## 2023-04-24 ENCOUNTER — APPOINTMENT (OUTPATIENT)
Dept: GENERAL RADIOLOGY | Age: 73
DRG: 467 | End: 2023-04-24
Payer: MEDICARE

## 2023-04-24 DIAGNOSIS — S72.002A CLOSED FRACTURE OF LEFT HIP, INITIAL ENCOUNTER (HCC): ICD-10-CM

## 2023-04-24 DIAGNOSIS — Z96.642 HISTORY OF REVISION OF TOTAL REPLACEMENT OF LEFT HIP JOINT: Primary | ICD-10-CM

## 2023-04-24 DIAGNOSIS — S73.005A DISLOCATION OF LEFT HIP, INITIAL ENCOUNTER (HCC): ICD-10-CM

## 2023-04-24 DIAGNOSIS — Z96.642 S/P TOTAL LEFT HIP ARTHROPLASTY: ICD-10-CM

## 2023-04-24 PROBLEM — D72.829 LEUKOCYTOSIS: Status: ACTIVE | Noted: 2023-04-24

## 2023-04-24 PROBLEM — M97.02XA PERIPROSTHETIC FRACTURE AROUND INTERNAL PROSTHETIC LEFT HIP JOINT, INITIAL ENCOUNTER (HCC): Status: ACTIVE | Noted: 2023-04-24

## 2023-04-24 LAB
ALBUMIN SERPL-MCNC: 3.6 G/DL (ref 3.5–5.2)
ALP SERPL-CCNC: 135 U/L (ref 35–104)
ALT SERPL-CCNC: 7 U/L (ref 5–33)
ANION GAP SERPL CALCULATED.3IONS-SCNC: 9 MMOL/L (ref 7–19)
AST SERPL-CCNC: 14 U/L (ref 5–32)
BACTERIA URNS QL MICRO: ABNORMAL /HPF
BASOPHILS # BLD: 0.1 K/UL (ref 0–0.2)
BASOPHILS NFR BLD: 0.4 % (ref 0–1)
BILIRUB SERPL-MCNC: 0.6 MG/DL (ref 0.2–1.2)
BILIRUB UR QL STRIP: NEGATIVE
BUN SERPL-MCNC: 8 MG/DL (ref 8–23)
CALCIUM SERPL-MCNC: 9.3 MG/DL (ref 8.8–10.2)
CHLORIDE SERPL-SCNC: 96 MMOL/L (ref 98–111)
CLARITY UR: ABNORMAL
CO2 SERPL-SCNC: 29 MMOL/L (ref 22–29)
COLOR UR: YELLOW
CREAT SERPL-MCNC: 0.6 MG/DL (ref 0.5–0.9)
CRYSTALS URNS MICRO: ABNORMAL /HPF
EOSINOPHIL # BLD: 0.2 K/UL (ref 0–0.6)
EOSINOPHIL NFR BLD: 1.1 % (ref 0–5)
EPI CELLS #/AREA URNS AUTO: 4 /HPF (ref 0–5)
ERYTHROCYTE [DISTWIDTH] IN BLOOD BY AUTOMATED COUNT: 14.7 % (ref 11.5–14.5)
GLUCOSE SERPL-MCNC: 122 MG/DL (ref 74–109)
GLUCOSE UR STRIP.AUTO-MCNC: 100 MG/DL
HCT VFR BLD AUTO: 34.9 % (ref 37–47)
HGB BLD-MCNC: 10.7 G/DL (ref 12–16)
HGB UR STRIP.AUTO-MCNC: NEGATIVE MG/L
HYALINE CASTS #/AREA URNS AUTO: 1 /HPF (ref 0–8)
IMM GRANULOCYTES # BLD: 0.1 K/UL
KETONES UR STRIP.AUTO-MCNC: NEGATIVE MG/DL
LEUKOCYTE ESTERASE UR QL STRIP.AUTO: ABNORMAL
LYMPHOCYTES # BLD: 1.6 K/UL (ref 1.1–4.5)
LYMPHOCYTES NFR BLD: 9.2 % (ref 20–40)
MCH RBC QN AUTO: 29.7 PG (ref 27–31)
MCHC RBC AUTO-ENTMCNC: 30.7 G/DL (ref 33–37)
MCV RBC AUTO: 96.9 FL (ref 81–99)
MONOCYTES # BLD: 1.4 K/UL (ref 0–0.9)
MONOCYTES NFR BLD: 7.9 % (ref 0–10)
NEUTROPHILS # BLD: 13.9 K/UL (ref 1.5–7.5)
NEUTS SEG NFR BLD: 81 % (ref 50–65)
NITRITE UR QL STRIP.AUTO: NEGATIVE
PH UR STRIP.AUTO: 6.5 [PH] (ref 5–8)
PLATELET # BLD AUTO: 375 K/UL (ref 130–400)
PMV BLD AUTO: 8.9 FL (ref 9.4–12.3)
POTASSIUM SERPL-SCNC: 3.7 MMOL/L (ref 3.5–5)
PROCALCITONIN: 0.08 NG/ML (ref 0–0.09)
PROT SERPL-MCNC: 6.5 G/DL (ref 6.6–8.7)
PROT UR STRIP.AUTO-MCNC: NEGATIVE MG/DL
RBC # BLD AUTO: 3.6 M/UL (ref 4.2–5.4)
RBC #/AREA URNS AUTO: 2 /HPF (ref 0–4)
SARS-COV-2 RDRP RESP QL NAA+PROBE: NOT DETECTED
SODIUM SERPL-SCNC: 134 MMOL/L (ref 136–145)
SP GR UR STRIP.AUTO: 1.01 (ref 1–1.03)
UROBILINOGEN UR STRIP.AUTO-MCNC: 1 E.U./DL
WBC # BLD AUTO: 17.1 K/UL (ref 4.8–10.8)
WBC #/AREA URNS AUTO: 9 /HPF (ref 0–5)

## 2023-04-24 PROCEDURE — 84145 PROCALCITONIN (PCT): CPT

## 2023-04-24 PROCEDURE — 73502 X-RAY EXAM HIP UNI 2-3 VIEWS: CPT | Performed by: RADIOLOGY

## 2023-04-24 PROCEDURE — 2700000000 HC OXYGEN THERAPY PER DAY

## 2023-04-24 PROCEDURE — 85025 COMPLETE CBC W/AUTO DIFF WBC: CPT

## 2023-04-24 PROCEDURE — 36415 COLL VENOUS BLD VENIPUNCTURE: CPT

## 2023-04-24 PROCEDURE — 71045 X-RAY EXAM CHEST 1 VIEW: CPT

## 2023-04-24 PROCEDURE — 71045 X-RAY EXAM CHEST 1 VIEW: CPT | Performed by: RADIOLOGY

## 2023-04-24 PROCEDURE — 6370000000 HC RX 637 (ALT 250 FOR IP): Performed by: HOSPITALIST

## 2023-04-24 PROCEDURE — 87635 SARS-COV-2 COVID-19 AMP PRB: CPT

## 2023-04-24 PROCEDURE — 99152 MOD SED SAME PHYS/QHP 5/>YRS: CPT

## 2023-04-24 PROCEDURE — 73502 X-RAY EXAM HIP UNI 2-3 VIEWS: CPT

## 2023-04-24 PROCEDURE — 2580000003 HC RX 258: Performed by: HOSPITALIST

## 2023-04-24 PROCEDURE — 87186 SC STD MICRODIL/AGAR DIL: CPT

## 2023-04-24 PROCEDURE — 87040 BLOOD CULTURE FOR BACTERIA: CPT

## 2023-04-24 PROCEDURE — 0SSBXZZ REPOSITION LEFT HIP JOINT, EXTERNAL APPROACH: ICD-10-PCS | Performed by: EMERGENCY MEDICINE

## 2023-04-24 PROCEDURE — 80053 COMPREHEN METABOLIC PANEL: CPT

## 2023-04-24 PROCEDURE — 1210000000 HC MED SURG R&B

## 2023-04-24 PROCEDURE — 2500000003 HC RX 250 WO HCPCS: Performed by: EMERGENCY MEDICINE

## 2023-04-24 PROCEDURE — 99285 EMERGENCY DEPT VISIT HI MDM: CPT

## 2023-04-24 PROCEDURE — 6360000002 HC RX W HCPCS: Performed by: EMERGENCY MEDICINE

## 2023-04-24 PROCEDURE — 96375 TX/PRO/DX INJ NEW DRUG ADDON: CPT

## 2023-04-24 PROCEDURE — 99153 MOD SED SAME PHYS/QHP EA: CPT

## 2023-04-24 PROCEDURE — 94760 N-INVAS EAR/PLS OXIMETRY 1: CPT

## 2023-04-24 PROCEDURE — 81001 URINALYSIS AUTO W/SCOPE: CPT

## 2023-04-24 PROCEDURE — 96374 THER/PROPH/DIAG INJ IV PUSH: CPT

## 2023-04-24 PROCEDURE — 87086 URINE CULTURE/COLONY COUNT: CPT

## 2023-04-24 RX ORDER — ARFORMOTEROL TARTRATE 15 UG/2ML
15 SOLUTION RESPIRATORY (INHALATION) 2 TIMES DAILY
Status: DISCONTINUED | OUTPATIENT
Start: 2023-04-24 | End: 2023-04-26 | Stop reason: HOSPADM

## 2023-04-24 RX ORDER — FENTANYL CITRATE 50 UG/ML
25 INJECTION, SOLUTION INTRAMUSCULAR; INTRAVENOUS ONCE
Status: DISCONTINUED | OUTPATIENT
Start: 2023-04-24 | End: 2023-04-24

## 2023-04-24 RX ORDER — OXYCODONE HYDROCHLORIDE 5 MG/1
5 TABLET ORAL EVERY 4 HOURS PRN
Status: DISCONTINUED | OUTPATIENT
Start: 2023-04-24 | End: 2023-04-25

## 2023-04-24 RX ORDER — POTASSIUM CHLORIDE 7.45 MG/ML
10 INJECTION INTRAVENOUS PRN
Status: DISCONTINUED | OUTPATIENT
Start: 2023-04-24 | End: 2023-04-26 | Stop reason: HOSPADM

## 2023-04-24 RX ORDER — FENTANYL CITRATE 50 UG/ML
12.5 INJECTION, SOLUTION INTRAMUSCULAR; INTRAVENOUS
Status: DISCONTINUED | OUTPATIENT
Start: 2023-04-24 | End: 2023-04-25

## 2023-04-24 RX ORDER — ARIPIPRAZOLE 2 MG/1
2 TABLET ORAL DAILY
Status: DISCONTINUED | OUTPATIENT
Start: 2023-04-25 | End: 2023-04-26 | Stop reason: HOSPADM

## 2023-04-24 RX ORDER — FENTANYL CITRATE 50 UG/ML
50 INJECTION, SOLUTION INTRAMUSCULAR; INTRAVENOUS ONCE
Status: COMPLETED | OUTPATIENT
Start: 2023-04-24 | End: 2023-04-24

## 2023-04-24 RX ORDER — ASPIRIN 81 MG/1
81 TABLET ORAL DAILY
Status: DISCONTINUED | OUTPATIENT
Start: 2023-04-25 | End: 2023-04-26 | Stop reason: HOSPADM

## 2023-04-24 RX ORDER — CALCIUM CARBONATE 200(500)MG
500 TABLET,CHEWABLE ORAL 3 TIMES DAILY PRN
Status: DISCONTINUED | OUTPATIENT
Start: 2023-04-24 | End: 2023-04-26 | Stop reason: HOSPADM

## 2023-04-24 RX ORDER — DULOXETIN HYDROCHLORIDE 30 MG/1
30 CAPSULE, DELAYED RELEASE ORAL DAILY
Status: DISCONTINUED | OUTPATIENT
Start: 2023-04-25 | End: 2023-04-26 | Stop reason: HOSPADM

## 2023-04-24 RX ORDER — HYDROCHLOROTHIAZIDE 25 MG/1
25 TABLET ORAL DAILY
Status: DISCONTINUED | OUTPATIENT
Start: 2023-04-25 | End: 2023-04-26 | Stop reason: HOSPADM

## 2023-04-24 RX ORDER — KETAMINE HYDROCHLORIDE 50 MG/ML
1 INJECTION, SOLUTION, CONCENTRATE INTRAMUSCULAR; INTRAVENOUS ONCE
Status: COMPLETED | OUTPATIENT
Start: 2023-04-24 | End: 2023-04-24

## 2023-04-24 RX ORDER — PROPOFOL 10 MG/ML
100 INJECTION, EMULSION INTRAVENOUS
Status: COMPLETED | OUTPATIENT
Start: 2023-04-24 | End: 2023-04-24

## 2023-04-24 RX ORDER — MONTELUKAST SODIUM 10 MG/1
10 TABLET ORAL DAILY
Status: DISCONTINUED | OUTPATIENT
Start: 2023-04-25 | End: 2023-04-26 | Stop reason: HOSPADM

## 2023-04-24 RX ORDER — MECOBALAMIN 5000 MCG
5 TABLET,DISINTEGRATING ORAL NIGHTLY PRN
Status: CANCELLED | OUTPATIENT
Start: 2023-04-24

## 2023-04-24 RX ORDER — ONDANSETRON 4 MG/1
4 TABLET, ORALLY DISINTEGRATING ORAL EVERY 8 HOURS PRN
Status: DISCONTINUED | OUTPATIENT
Start: 2023-04-24 | End: 2023-04-26 | Stop reason: HOSPADM

## 2023-04-24 RX ORDER — POLYETHYLENE GLYCOL 3350 17 G/17G
17 POWDER, FOR SOLUTION ORAL DAILY PRN
Status: DISCONTINUED | OUTPATIENT
Start: 2023-04-24 | End: 2023-04-26 | Stop reason: HOSPADM

## 2023-04-24 RX ORDER — OXYCODONE HYDROCHLORIDE 5 MG/1
10 TABLET ORAL EVERY 4 HOURS PRN
Status: DISCONTINUED | OUTPATIENT
Start: 2023-04-24 | End: 2023-04-25

## 2023-04-24 RX ORDER — POLYETHYLENE GLYCOL 3350 17 G/17G
17 POWDER, FOR SOLUTION ORAL DAILY PRN
Status: CANCELLED | OUTPATIENT
Start: 2023-04-24

## 2023-04-24 RX ORDER — MECOBALAMIN 5000 MCG
5 TABLET,DISINTEGRATING ORAL NIGHTLY PRN
Status: DISCONTINUED | OUTPATIENT
Start: 2023-04-24 | End: 2023-04-26 | Stop reason: HOSPADM

## 2023-04-24 RX ORDER — NALOXONE HYDROCHLORIDE 0.4 MG/ML
0.4 INJECTION, SOLUTION INTRAMUSCULAR; INTRAVENOUS; SUBCUTANEOUS PRN
Status: DISCONTINUED | OUTPATIENT
Start: 2023-04-24 | End: 2023-04-24

## 2023-04-24 RX ORDER — FENTANYL CITRATE 50 UG/ML
25 INJECTION, SOLUTION INTRAMUSCULAR; INTRAVENOUS
Status: DISCONTINUED | OUTPATIENT
Start: 2023-04-24 | End: 2023-04-25

## 2023-04-24 RX ORDER — NALOXONE HYDROCHLORIDE 0.4 MG/ML
0.4 INJECTION, SOLUTION INTRAMUSCULAR; INTRAVENOUS; SUBCUTANEOUS PRN
Status: DISCONTINUED | OUTPATIENT
Start: 2023-04-24 | End: 2023-04-26 | Stop reason: HOSPADM

## 2023-04-24 RX ORDER — SODIUM CHLORIDE 9 MG/ML
INJECTION, SOLUTION INTRAVENOUS PRN
Status: DISCONTINUED | OUTPATIENT
Start: 2023-04-24 | End: 2023-04-25 | Stop reason: SDUPTHER

## 2023-04-24 RX ORDER — FENTANYL CITRATE 50 UG/ML
50 INJECTION, SOLUTION INTRAMUSCULAR; INTRAVENOUS
Status: DISCONTINUED | OUTPATIENT
Start: 2023-04-24 | End: 2023-04-26 | Stop reason: SDUPTHER

## 2023-04-24 RX ORDER — POTASSIUM CHLORIDE 20 MEQ/1
40 TABLET, EXTENDED RELEASE ORAL PRN
Status: DISCONTINUED | OUTPATIENT
Start: 2023-04-24 | End: 2023-04-26 | Stop reason: HOSPADM

## 2023-04-24 RX ORDER — CETIRIZINE HYDROCHLORIDE 10 MG/1
10 TABLET ORAL DAILY
Status: DISCONTINUED | OUTPATIENT
Start: 2023-04-25 | End: 2023-04-26 | Stop reason: HOSPADM

## 2023-04-24 RX ORDER — AMLODIPINE BESYLATE 5 MG/1
5 TABLET ORAL DAILY
Status: DISCONTINUED | OUTPATIENT
Start: 2023-04-25 | End: 2023-04-26 | Stop reason: HOSPADM

## 2023-04-24 RX ORDER — MECOBALAMIN 5000 MCG
5 TABLET,DISINTEGRATING ORAL NIGHTLY
Status: CANCELLED | OUTPATIENT
Start: 2023-04-24

## 2023-04-24 RX ORDER — ALBUTEROL SULFATE 2.5 MG/3ML
2.5 SOLUTION RESPIRATORY (INHALATION) EVERY 4 HOURS PRN
Status: DISCONTINUED | OUTPATIENT
Start: 2023-04-24 | End: 2023-04-26 | Stop reason: HOSPADM

## 2023-04-24 RX ORDER — BUDESONIDE 0.5 MG/2ML
0.5 INHALANT ORAL EVERY 12 HOURS
Status: DISCONTINUED | OUTPATIENT
Start: 2023-04-24 | End: 2023-04-26 | Stop reason: HOSPADM

## 2023-04-24 RX ORDER — OXYBUTYNIN CHLORIDE 5 MG/1
5 TABLET ORAL DAILY
Status: DISCONTINUED | OUTPATIENT
Start: 2023-04-25 | End: 2023-04-26 | Stop reason: HOSPADM

## 2023-04-24 RX ORDER — ONDANSETRON 2 MG/ML
4 INJECTION INTRAMUSCULAR; INTRAVENOUS ONCE
Status: COMPLETED | OUTPATIENT
Start: 2023-04-24 | End: 2023-04-24

## 2023-04-24 RX ORDER — ONDANSETRON 2 MG/ML
4 INJECTION INTRAMUSCULAR; INTRAVENOUS EVERY 6 HOURS PRN
Status: DISCONTINUED | OUTPATIENT
Start: 2023-04-24 | End: 2023-04-25 | Stop reason: SDUPTHER

## 2023-04-24 RX ORDER — MECOBALAMIN 5000 MCG
5 TABLET,DISINTEGRATING ORAL NIGHTLY
Status: DISCONTINUED | OUTPATIENT
Start: 2023-04-24 | End: 2023-04-26 | Stop reason: HOSPADM

## 2023-04-24 RX ORDER — SODIUM CHLORIDE 0.9 % (FLUSH) 0.9 %
5-40 SYRINGE (ML) INJECTION PRN
Status: DISCONTINUED | OUTPATIENT
Start: 2023-04-24 | End: 2023-04-26 | Stop reason: HOSPADM

## 2023-04-24 RX ORDER — ALPRAZOLAM 0.25 MG/1
0.25 TABLET ORAL NIGHTLY PRN
Status: DISCONTINUED | OUTPATIENT
Start: 2023-04-24 | End: 2023-04-26 | Stop reason: HOSPADM

## 2023-04-24 RX ORDER — ONDANSETRON 2 MG/ML
4 INJECTION INTRAMUSCULAR; INTRAVENOUS EVERY 6 HOURS PRN
Status: DISCONTINUED | OUTPATIENT
Start: 2023-04-24 | End: 2023-04-26 | Stop reason: HOSPADM

## 2023-04-24 RX ORDER — SODIUM CHLORIDE 0.9 % (FLUSH) 0.9 %
5-40 SYRINGE (ML) INJECTION EVERY 12 HOURS SCHEDULED
Status: DISCONTINUED | OUTPATIENT
Start: 2023-04-24 | End: 2023-04-26 | Stop reason: HOSPADM

## 2023-04-24 RX ORDER — OXYCODONE HYDROCHLORIDE 5 MG/1
2.5 TABLET ORAL EVERY 4 HOURS PRN
Status: DISCONTINUED | OUTPATIENT
Start: 2023-04-24 | End: 2023-04-25

## 2023-04-24 RX ORDER — LOSARTAN POTASSIUM 100 MG/1
100 TABLET ORAL DAILY
Status: DISCONTINUED | OUTPATIENT
Start: 2023-04-25 | End: 2023-04-26 | Stop reason: HOSPADM

## 2023-04-24 RX ORDER — MAGNESIUM SULFATE IN WATER 40 MG/ML
2000 INJECTION, SOLUTION INTRAVENOUS PRN
Status: DISCONTINUED | OUTPATIENT
Start: 2023-04-24 | End: 2023-04-26 | Stop reason: HOSPADM

## 2023-04-24 RX ADMIN — KETAMINE HYDROCHLORIDE 65 MG: 50 INJECTION INTRAMUSCULAR; INTRAVENOUS at 16:19

## 2023-04-24 RX ADMIN — FENTANYL CITRATE 50 MCG: 50 INJECTION, SOLUTION INTRAMUSCULAR; INTRAVENOUS at 14:47

## 2023-04-24 RX ADMIN — KETAMINE HYDROCHLORIDE 65 MG: 50 INJECTION INTRAMUSCULAR; INTRAVENOUS at 16:13

## 2023-04-24 RX ADMIN — Medication 5 MG: at 22:16

## 2023-04-24 RX ADMIN — ONDANSETRON 4 MG: 2 INJECTION INTRAMUSCULAR; INTRAVENOUS at 14:47

## 2023-04-24 RX ADMIN — OXYCODONE 10 MG: 5 TABLET ORAL at 22:17

## 2023-04-24 RX ADMIN — SODIUM CHLORIDE, PRESERVATIVE FREE 10 ML: 5 INJECTION INTRAVENOUS at 22:18

## 2023-04-24 RX ADMIN — PROPOFOL 100 MG: 10 INJECTION, EMULSION INTRAVENOUS at 16:07

## 2023-04-24 RX ADMIN — KETAMINE HYDROCHLORIDE 65 MG: 50 INJECTION INTRAMUSCULAR; INTRAVENOUS at 16:09

## 2023-04-24 RX ADMIN — ALPRAZOLAM 0.25 MG: 0.25 TABLET ORAL at 22:17

## 2023-04-24 ASSESSMENT — ENCOUNTER SYMPTOMS
EYES NEGATIVE: 1
RESPIRATORY NEGATIVE: 1
GASTROINTESTINAL NEGATIVE: 1

## 2023-04-24 ASSESSMENT — PAIN - FUNCTIONAL ASSESSMENT: PAIN_FUNCTIONAL_ASSESSMENT: ACTIVITIES ARE NOT PREVENTED

## 2023-04-24 ASSESSMENT — PAIN SCALES - GENERAL
PAINLEVEL_OUTOF10: 9
PAINLEVEL_OUTOF10: 0
PAINLEVEL_OUTOF10: 10

## 2023-04-24 ASSESSMENT — PAIN DESCRIPTION - ORIENTATION
ORIENTATION: RIGHT
ORIENTATION: LEFT

## 2023-04-24 ASSESSMENT — PAIN DESCRIPTION - DESCRIPTORS: DESCRIPTORS: ACHING;SORE;STABBING

## 2023-04-24 ASSESSMENT — PAIN DESCRIPTION - LOCATION
LOCATION: HIP
LOCATION: HIP

## 2023-04-24 NOTE — SEDATION DOCUMENTATION
Total of 100mg propofol given during procedure.     1611 - 10mg  1613 - 30mg  1615 - 20mg   1617 - 20mg  1620 - 20mg

## 2023-04-24 NOTE — ED PROVIDER NOTES
[facility] [address] [facility]      Pt Name: Abbey Hernandez  MRN: [id number]  [place] 1950  Date of evaluation: 4/24/2023  Provider: Katherine Abbasi MD    CHIEF COMPLAINT       Chief Complaint   Patient presents with    Hip Pain     Left; bent down and felt a pop          HISTORY OF PRESENT ILLNESS   (Location/Symptom, Timing/Onset,Context/Setting, Quality, Duration, Modifying Factors, Severity)  Note limiting factors. Abbey Hernandez is a 72 y.o. female who presents to the emergency department for evaluation after injury to the left hip. Says she bent over while she was at a restaurant today and felt a sudden pop in her left hip. Believes she dislocated. Has had bilateral hip replacements. Left arm was done 4 years ago in [place]. Says that they put a cup on it to prevent dislocations at the time. Has not had any dislocations since the hip replacement. Denies any other injuries associated with the episode today. HPI    NursingNotes were reviewed. REVIEW OF SYSTEMS    (2-9 systems for level 4, 10 or more for level 5)     Review of Systems   Constitutional: Negative. HENT: Negative. Eyes: Negative. Respiratory: Negative. Cardiovascular: Negative. Gastrointestinal: Negative. Genitourinary: Negative. Musculoskeletal:  Positive for arthralgias. Skin: Negative. Neurological: Negative. Hematological: Negative. Psychiatric/Behavioral: Negative. A complete review of systems was performed and is negative except as noted above in the HPI.        PAST MEDICAL HISTORY     Past Medical History:   Diagnosis Date    Anemia     Anxiety     Asthma     CAD (coronary artery disease)     CAD (coronary artery disease)     Cervical stenosis of spine     Chest tightness or pressure 02/02/2014 2/2/2014  lexiscan Positive for inferior apical myocardial ischemia, EF 62%, 2% ischemic myocardium on stress, low risk findings, AUC indication 15, AUC score 4

## 2023-04-25 ENCOUNTER — APPOINTMENT (OUTPATIENT)
Dept: GENERAL RADIOLOGY | Age: 73
DRG: 467 | End: 2023-04-25
Payer: MEDICARE

## 2023-04-25 ENCOUNTER — ANESTHESIA (OUTPATIENT)
Dept: OPERATING ROOM | Age: 73
DRG: 467 | End: 2023-04-25
Payer: MEDICARE

## 2023-04-25 ENCOUNTER — ANESTHESIA EVENT (OUTPATIENT)
Dept: OPERATING ROOM | Age: 73
DRG: 467 | End: 2023-04-25
Payer: MEDICARE

## 2023-04-25 LAB
ABO + RH BLD: NORMAL
ANION GAP SERPL CALCULATED.3IONS-SCNC: 11 MMOL/L (ref 7–19)
BASOPHILS # BLD: 0.1 K/UL (ref 0–0.2)
BASOPHILS NFR BLD: 0.5 % (ref 0–1)
BLD GP AB SCN SERPL QL: NORMAL
BUN SERPL-MCNC: 8 MG/DL (ref 8–23)
CALCIUM SERPL-MCNC: 9 MG/DL (ref 8.8–10.2)
CHLORIDE SERPL-SCNC: 97 MMOL/L (ref 98–111)
CO2 SERPL-SCNC: 29 MMOL/L (ref 22–29)
CREAT SERPL-MCNC: 0.7 MG/DL (ref 0.5–0.9)
EOSINOPHIL # BLD: 0.1 K/UL (ref 0–0.6)
EOSINOPHIL NFR BLD: 0.8 % (ref 0–5)
ERYTHROCYTE [DISTWIDTH] IN BLOOD BY AUTOMATED COUNT: 14.7 % (ref 11.5–14.5)
GLUCOSE SERPL-MCNC: 122 MG/DL (ref 74–109)
HCT VFR BLD AUTO: 30.9 % (ref 37–47)
HGB BLD-MCNC: 9.7 G/DL (ref 12–16)
IMM GRANULOCYTES # BLD: 0.1 K/UL
LYMPHOCYTES # BLD: 2.4 K/UL (ref 1.1–4.5)
LYMPHOCYTES NFR BLD: 14.4 % (ref 20–40)
MCH RBC QN AUTO: 29.8 PG (ref 27–31)
MCHC RBC AUTO-ENTMCNC: 31.4 G/DL (ref 33–37)
MCV RBC AUTO: 94.8 FL (ref 81–99)
MONOCYTES # BLD: 1.5 K/UL (ref 0–0.9)
MONOCYTES NFR BLD: 9.3 % (ref 0–10)
NEUTROPHILS # BLD: 12.3 K/UL (ref 1.5–7.5)
NEUTS SEG NFR BLD: 74.6 % (ref 50–65)
PLATELET # BLD AUTO: 364 K/UL (ref 130–400)
PMV BLD AUTO: 9.5 FL (ref 9.4–12.3)
POTASSIUM SERPL-SCNC: 4.1 MMOL/L (ref 3.5–5)
RBC # BLD AUTO: 3.26 M/UL (ref 4.2–5.4)
SODIUM SERPL-SCNC: 137 MMOL/L (ref 136–145)
WBC # BLD AUTO: 16.5 K/UL (ref 4.8–10.8)

## 2023-04-25 PROCEDURE — 7100000001 HC PACU RECOVERY - ADDTL 15 MIN: Performed by: ORTHOPAEDIC SURGERY

## 2023-04-25 PROCEDURE — 86850 RBC ANTIBODY SCREEN: CPT

## 2023-04-25 PROCEDURE — 2709999900 HC NON-CHARGEABLE SUPPLY: Performed by: ORTHOPAEDIC SURGERY

## 2023-04-25 PROCEDURE — 86901 BLOOD TYPING SEROLOGIC RH(D): CPT

## 2023-04-25 PROCEDURE — 0SUS09Z SUPPLEMENT LEFT HIP JOINT, FEMORAL SURFACE WITH LINER, OPEN APPROACH: ICD-10-PCS | Performed by: ORTHOPAEDIC SURGERY

## 2023-04-25 PROCEDURE — 3600000005 HC SURGERY LEVEL 5 BASE: Performed by: ORTHOPAEDIC SURGERY

## 2023-04-25 PROCEDURE — 87176 TISSUE HOMOGENIZATION CULTR: CPT

## 2023-04-25 PROCEDURE — 3700000000 HC ANESTHESIA ATTENDED CARE: Performed by: ORTHOPAEDIC SURGERY

## 2023-04-25 PROCEDURE — 3700000001 HC ADD 15 MINUTES (ANESTHESIA): Performed by: ORTHOPAEDIC SURGERY

## 2023-04-25 PROCEDURE — 2500000003 HC RX 250 WO HCPCS

## 2023-04-25 PROCEDURE — A4217 STERILE WATER/SALINE, 500 ML: HCPCS | Performed by: ORTHOPAEDIC SURGERY

## 2023-04-25 PROCEDURE — 6360000002 HC RX W HCPCS: Performed by: ORTHOPAEDIC SURGERY

## 2023-04-25 PROCEDURE — 3600000015 HC SURGERY LEVEL 5 ADDTL 15MIN: Performed by: ORTHOPAEDIC SURGERY

## 2023-04-25 PROCEDURE — 6360000002 HC RX W HCPCS

## 2023-04-25 PROCEDURE — 6360000002 HC RX W HCPCS: Performed by: HOSPITALIST

## 2023-04-25 PROCEDURE — C1776 JOINT DEVICE (IMPLANTABLE): HCPCS | Performed by: ORTHOPAEDIC SURGERY

## 2023-04-25 PROCEDURE — 80048 BASIC METABOLIC PNL TOTAL CA: CPT

## 2023-04-25 PROCEDURE — 87205 SMEAR GRAM STAIN: CPT

## 2023-04-25 PROCEDURE — 73502 X-RAY EXAM HIP UNI 2-3 VIEWS: CPT

## 2023-04-25 PROCEDURE — 3209999900 FLUORO FOR SURGICAL PROCEDURES

## 2023-04-25 PROCEDURE — 87070 CULTURE OTHR SPECIMN AEROBIC: CPT

## 2023-04-25 PROCEDURE — 2580000003 HC RX 258: Performed by: ORTHOPAEDIC SURGERY

## 2023-04-25 PROCEDURE — 85025 COMPLETE CBC W/AUTO DIFF WBC: CPT

## 2023-04-25 PROCEDURE — 1210000000 HC MED SURG R&B

## 2023-04-25 PROCEDURE — 36415 COLL VENOUS BLD VENIPUNCTURE: CPT

## 2023-04-25 PROCEDURE — 94640 AIRWAY INHALATION TREATMENT: CPT

## 2023-04-25 PROCEDURE — 86900 BLOOD TYPING SEROLOGIC ABO: CPT

## 2023-04-25 PROCEDURE — C1713 ANCHOR/SCREW BN/BN,TIS/BN: HCPCS | Performed by: ORTHOPAEDIC SURGERY

## 2023-04-25 PROCEDURE — 6370000000 HC RX 637 (ALT 250 FOR IP): Performed by: HOSPITALIST

## 2023-04-25 PROCEDURE — 6370000000 HC RX 637 (ALT 250 FOR IP): Performed by: ORTHOPAEDIC SURGERY

## 2023-04-25 PROCEDURE — 2500000003 HC RX 250 WO HCPCS: Performed by: ORTHOPAEDIC SURGERY

## 2023-04-25 PROCEDURE — 94760 N-INVAS EAR/PLS OXIMETRY 1: CPT

## 2023-04-25 PROCEDURE — 0SRS03Z REPLACEMENT OF LEFT HIP JOINT, FEMORAL SURFACE WITH CERAMIC SYNTHETIC SUBSTITUTE, OPEN APPROACH: ICD-10-PCS | Performed by: ORTHOPAEDIC SURGERY

## 2023-04-25 PROCEDURE — 7100000000 HC PACU RECOVERY - FIRST 15 MIN: Performed by: ORTHOPAEDIC SURGERY

## 2023-04-25 PROCEDURE — 0SPB09Z REMOVAL OF LINER FROM LEFT HIP JOINT, OPEN APPROACH: ICD-10-PCS | Performed by: ORTHOPAEDIC SURGERY

## 2023-04-25 PROCEDURE — 0SPS0JZ REMOVAL OF SYNTHETIC SUBSTITUTE FROM LEFT HIP JOINT, FEMORAL SURFACE, OPEN APPROACH: ICD-10-PCS | Performed by: ORTHOPAEDIC SURGERY

## 2023-04-25 PROCEDURE — 87075 CULTR BACTERIA EXCEPT BLOOD: CPT

## 2023-04-25 PROCEDURE — 73502 X-RAY EXAM HIP UNI 2-3 VIEWS: CPT | Performed by: RADIOLOGY

## 2023-04-25 DEVICE — IMPLANTABLE DEVICE: Type: IMPLANTABLE DEVICE | Site: HIP | Status: FUNCTIONAL

## 2023-04-25 RX ORDER — SODIUM CHLORIDE, SODIUM LACTATE, POTASSIUM CHLORIDE, CALCIUM CHLORIDE 600; 310; 30; 20 MG/100ML; MG/100ML; MG/100ML; MG/100ML
INJECTION, SOLUTION INTRAVENOUS CONTINUOUS
Status: DISCONTINUED | OUTPATIENT
Start: 2023-04-25 | End: 2023-04-25 | Stop reason: HOSPADM

## 2023-04-25 RX ORDER — SODIUM CHLORIDE 0.9 % (FLUSH) 0.9 %
5-40 SYRINGE (ML) INJECTION EVERY 12 HOURS SCHEDULED
Status: DISCONTINUED | OUTPATIENT
Start: 2023-04-25 | End: 2023-04-25 | Stop reason: HOSPADM

## 2023-04-25 RX ORDER — HYDROMORPHONE HYDROCHLORIDE 1 MG/ML
0.5 INJECTION, SOLUTION INTRAMUSCULAR; INTRAVENOUS; SUBCUTANEOUS EVERY 5 MIN PRN
Status: DISCONTINUED | OUTPATIENT
Start: 2023-04-25 | End: 2023-04-25 | Stop reason: HOSPADM

## 2023-04-25 RX ORDER — SODIUM CHLORIDE 9 MG/ML
INJECTION, SOLUTION INTRAVENOUS PRN
Status: DISCONTINUED | OUTPATIENT
Start: 2023-04-25 | End: 2023-04-25 | Stop reason: HOSPADM

## 2023-04-25 RX ORDER — ACETAMINOPHEN 500 MG
1000 TABLET ORAL ONCE
Status: COMPLETED | OUTPATIENT
Start: 2023-04-25 | End: 2023-04-25

## 2023-04-25 RX ORDER — FENTANYL CITRATE 50 UG/ML
INJECTION, SOLUTION INTRAMUSCULAR; INTRAVENOUS PRN
Status: DISCONTINUED | OUTPATIENT
Start: 2023-04-25 | End: 2023-04-25 | Stop reason: SDUPTHER

## 2023-04-25 RX ORDER — DEXAMETHASONE SODIUM PHOSPHATE 10 MG/ML
INJECTION, SOLUTION INTRAMUSCULAR; INTRAVENOUS PRN
Status: DISCONTINUED | OUTPATIENT
Start: 2023-04-25 | End: 2023-04-25 | Stop reason: SDUPTHER

## 2023-04-25 RX ORDER — HYDROMORPHONE HYDROCHLORIDE 1 MG/ML
0.25 INJECTION, SOLUTION INTRAMUSCULAR; INTRAVENOUS; SUBCUTANEOUS EVERY 5 MIN PRN
Status: DISCONTINUED | OUTPATIENT
Start: 2023-04-25 | End: 2023-04-25 | Stop reason: HOSPADM

## 2023-04-25 RX ORDER — OXYCODONE HYDROCHLORIDE 5 MG/1
5 TABLET ORAL EVERY 4 HOURS PRN
Status: DISCONTINUED | OUTPATIENT
Start: 2023-04-25 | End: 2023-04-26 | Stop reason: HOSPADM

## 2023-04-25 RX ORDER — ACETAMINOPHEN 325 MG/1
650 TABLET ORAL EVERY 6 HOURS
Status: DISCONTINUED | OUTPATIENT
Start: 2023-04-25 | End: 2023-04-26 | Stop reason: HOSPADM

## 2023-04-25 RX ORDER — SODIUM CHLORIDE 9 MG/ML
INJECTION, SOLUTION INTRAVENOUS PRN
Status: DISCONTINUED | OUTPATIENT
Start: 2023-04-25 | End: 2023-04-26 | Stop reason: HOSPADM

## 2023-04-25 RX ORDER — PROPOFOL 10 MG/ML
INJECTION, EMULSION INTRAVENOUS PRN
Status: DISCONTINUED | OUTPATIENT
Start: 2023-04-25 | End: 2023-04-25 | Stop reason: SDUPTHER

## 2023-04-25 RX ORDER — ROPIVACAINE HYDROCHLORIDE 2 MG/ML
INJECTION, SOLUTION EPIDURAL; INFILTRATION; PERINEURAL PRN
Status: DISCONTINUED | OUTPATIENT
Start: 2023-04-25 | End: 2023-04-25 | Stop reason: ALTCHOICE

## 2023-04-25 RX ORDER — OXYCODONE HYDROCHLORIDE 5 MG/1
10 TABLET ORAL EVERY 4 HOURS PRN
Status: DISCONTINUED | OUTPATIENT
Start: 2023-04-25 | End: 2023-04-26 | Stop reason: HOSPADM

## 2023-04-25 RX ORDER — METOCLOPRAMIDE HYDROCHLORIDE 5 MG/ML
10 INJECTION INTRAMUSCULAR; INTRAVENOUS
Status: DISCONTINUED | OUTPATIENT
Start: 2023-04-25 | End: 2023-04-25 | Stop reason: HOSPADM

## 2023-04-25 RX ORDER — SODIUM CHLORIDE 0.9 % (FLUSH) 0.9 %
5-40 SYRINGE (ML) INJECTION PRN
Status: DISCONTINUED | OUTPATIENT
Start: 2023-04-25 | End: 2023-04-25 | Stop reason: HOSPADM

## 2023-04-25 RX ORDER — EPHEDRINE SULFATE 50 MG/ML
INJECTION, SOLUTION INTRAVENOUS PRN
Status: DISCONTINUED | OUTPATIENT
Start: 2023-04-25 | End: 2023-04-25 | Stop reason: SDUPTHER

## 2023-04-25 RX ORDER — MORPHINE SULFATE 2 MG/ML
2 INJECTION, SOLUTION INTRAMUSCULAR; INTRAVENOUS
Status: DISCONTINUED | OUTPATIENT
Start: 2023-04-25 | End: 2023-04-26 | Stop reason: HOSPADM

## 2023-04-25 RX ORDER — ONDANSETRON 2 MG/ML
INJECTION INTRAMUSCULAR; INTRAVENOUS PRN
Status: DISCONTINUED | OUTPATIENT
Start: 2023-04-25 | End: 2023-04-25 | Stop reason: SDUPTHER

## 2023-04-25 RX ORDER — LIDOCAINE HYDROCHLORIDE 10 MG/ML
INJECTION, SOLUTION EPIDURAL; INFILTRATION; INTRACAUDAL; PERINEURAL PRN
Status: DISCONTINUED | OUTPATIENT
Start: 2023-04-25 | End: 2023-04-25 | Stop reason: SDUPTHER

## 2023-04-25 RX ORDER — DIPHENHYDRAMINE HYDROCHLORIDE 50 MG/ML
12.5 INJECTION INTRAMUSCULAR; INTRAVENOUS
Status: DISCONTINUED | OUTPATIENT
Start: 2023-04-25 | End: 2023-04-25 | Stop reason: HOSPADM

## 2023-04-25 RX ORDER — MORPHINE SULFATE 4 MG/ML
4 INJECTION, SOLUTION INTRAMUSCULAR; INTRAVENOUS
Status: DISCONTINUED | OUTPATIENT
Start: 2023-04-25 | End: 2023-04-26 | Stop reason: HOSPADM

## 2023-04-25 RX ORDER — ROCURONIUM BROMIDE 10 MG/ML
INJECTION, SOLUTION INTRAVENOUS PRN
Status: DISCONTINUED | OUTPATIENT
Start: 2023-04-25 | End: 2023-04-25 | Stop reason: SDUPTHER

## 2023-04-25 RX ORDER — TRANEXAMIC ACID 650 MG/1
1950 TABLET ORAL
Status: COMPLETED | OUTPATIENT
Start: 2023-04-25 | End: 2023-04-25

## 2023-04-25 RX ORDER — MEPERIDINE HYDROCHLORIDE 25 MG/ML
12.5 INJECTION INTRAMUSCULAR; INTRAVENOUS; SUBCUTANEOUS EVERY 5 MIN PRN
Status: DISCONTINUED | OUTPATIENT
Start: 2023-04-25 | End: 2023-04-25 | Stop reason: HOSPADM

## 2023-04-25 RX ORDER — NITROGLYCERIN 20 MG/100ML
INJECTION INTRAVENOUS PRN
Status: DISCONTINUED | OUTPATIENT
Start: 2023-04-25 | End: 2023-04-25 | Stop reason: SDUPTHER

## 2023-04-25 RX ORDER — SODIUM CHLORIDE 0.9 % (FLUSH) 0.9 %
5-40 SYRINGE (ML) INJECTION EVERY 12 HOURS SCHEDULED
Status: DISCONTINUED | OUTPATIENT
Start: 2023-04-25 | End: 2023-04-26 | Stop reason: HOSPADM

## 2023-04-25 RX ORDER — SODIUM CHLORIDE 0.9 % (FLUSH) 0.9 %
5-40 SYRINGE (ML) INJECTION PRN
Status: DISCONTINUED | OUTPATIENT
Start: 2023-04-25 | End: 2023-04-26 | Stop reason: HOSPADM

## 2023-04-25 RX ORDER — ONDANSETRON 2 MG/ML
4 INJECTION INTRAMUSCULAR; INTRAVENOUS EVERY 6 HOURS PRN
Status: DISCONTINUED | OUTPATIENT
Start: 2023-04-25 | End: 2023-04-25 | Stop reason: SDUPTHER

## 2023-04-25 RX ORDER — DEXAMETHASONE SODIUM PHOSPHATE 10 MG/ML
8 INJECTION, SOLUTION INTRAMUSCULAR; INTRAVENOUS ONCE
Status: DISCONTINUED | OUTPATIENT
Start: 2023-04-25 | End: 2023-04-25 | Stop reason: HOSPADM

## 2023-04-25 RX ADMIN — EPHEDRINE SULFATE 10 MG: 50 INJECTION INTRAMUSCULAR; INTRAVENOUS; SUBCUTANEOUS at 13:44

## 2023-04-25 RX ADMIN — ROCURONIUM BROMIDE 50 MG: 10 INJECTION, SOLUTION INTRAVENOUS at 13:15

## 2023-04-25 RX ADMIN — ASPIRIN 325 MG: 325 TABLET, COATED ORAL at 20:05

## 2023-04-25 RX ADMIN — NITROGLYCERIN 40 MCG: 20 INJECTION INTRAVENOUS at 14:27

## 2023-04-25 RX ADMIN — FENTANYL CITRATE 50 MCG: 0.05 INJECTION, SOLUTION INTRAMUSCULAR; INTRAVENOUS at 14:05

## 2023-04-25 RX ADMIN — OXYCODONE 10 MG: 5 TABLET ORAL at 03:30

## 2023-04-25 RX ADMIN — DEXAMETHASONE SODIUM PHOSPHATE 10 MG: 10 INJECTION, SOLUTION INTRAMUSCULAR; INTRAVENOUS at 13:15

## 2023-04-25 RX ADMIN — PROPOFOL 50 MG: 10 INJECTION, EMULSION INTRAVENOUS at 15:10

## 2023-04-25 RX ADMIN — NITROGLYCERIN 40 MCG: 20 INJECTION INTRAVENOUS at 14:15

## 2023-04-25 RX ADMIN — PHENYLEPHRINE HYDROCHLORIDE 100 MCG: 10 INJECTION INTRAVENOUS at 13:50

## 2023-04-25 RX ADMIN — EPHEDRINE SULFATE 10 MG: 50 INJECTION INTRAMUSCULAR; INTRAVENOUS; SUBCUTANEOUS at 13:47

## 2023-04-25 RX ADMIN — EPHEDRINE SULFATE 10 MG: 50 INJECTION INTRAMUSCULAR; INTRAVENOUS; SUBCUTANEOUS at 15:05

## 2023-04-25 RX ADMIN — CEFAZOLIN 2000 MG: 2 INJECTION, POWDER, FOR SOLUTION INTRAMUSCULAR; INTRAVENOUS at 20:04

## 2023-04-25 RX ADMIN — SODIUM CHLORIDE, SODIUM LACTATE, POTASSIUM CHLORIDE, AND CALCIUM CHLORIDE: 600; 310; 30; 20 INJECTION, SOLUTION INTRAVENOUS at 14:11

## 2023-04-25 RX ADMIN — ONDANSETRON 4 MG: 2 INJECTION INTRAMUSCULAR; INTRAVENOUS at 15:14

## 2023-04-25 RX ADMIN — BUDESONIDE INHALATION 500 MCG: 0.5 SUSPENSION RESPIRATORY (INHALATION) at 06:54

## 2023-04-25 RX ADMIN — SUGAMMADEX 100 MG: 100 INJECTION, SOLUTION INTRAVENOUS at 15:09

## 2023-04-25 RX ADMIN — FENTANYL CITRATE 50 MCG: 50 INJECTION INTRAMUSCULAR; INTRAVENOUS at 11:39

## 2023-04-25 RX ADMIN — BUDESONIDE INHALATION 500 MCG: 0.5 SUSPENSION RESPIRATORY (INHALATION) at 00:09

## 2023-04-25 RX ADMIN — EPHEDRINE SULFATE 10 MG: 50 INJECTION INTRAMUSCULAR; INTRAVENOUS; SUBCUTANEOUS at 15:15

## 2023-04-25 RX ADMIN — FENTANYL CITRATE 50 MCG: 0.05 INJECTION, SOLUTION INTRAMUSCULAR; INTRAVENOUS at 14:10

## 2023-04-25 RX ADMIN — NITROGLYCERIN 40 MCG: 20 INJECTION INTRAVENOUS at 14:21

## 2023-04-25 RX ADMIN — FENTANYL CITRATE 50 MCG: 50 INJECTION INTRAMUSCULAR; INTRAVENOUS at 08:13

## 2023-04-25 RX ADMIN — FENTANYL CITRATE 50 MCG: 0.05 INJECTION, SOLUTION INTRAMUSCULAR; INTRAVENOUS at 13:15

## 2023-04-25 RX ADMIN — FENTANYL CITRATE 100 MCG: 0.05 INJECTION, SOLUTION INTRAMUSCULAR; INTRAVENOUS at 14:34

## 2023-04-25 RX ADMIN — ROCURONIUM BROMIDE 30 MG: 10 INJECTION, SOLUTION INTRAVENOUS at 14:16

## 2023-04-25 RX ADMIN — ACETAMINOPHEN 650 MG: 325 TABLET ORAL at 23:07

## 2023-04-25 RX ADMIN — ACETAMINOPHEN 1000 MG: 500 TABLET ORAL at 12:08

## 2023-04-25 RX ADMIN — CEFAZOLIN 2000 MG: 2 INJECTION, POWDER, FOR SOLUTION INTRAMUSCULAR; INTRAVENOUS at 13:23

## 2023-04-25 RX ADMIN — ARFORMOTEROL TARTRATE 15 MCG: 15 SOLUTION RESPIRATORY (INHALATION) at 06:54

## 2023-04-25 RX ADMIN — SODIUM CHLORIDE, SODIUM LACTATE, POTASSIUM CHLORIDE, AND CALCIUM CHLORIDE: 600; 310; 30; 20 INJECTION, SOLUTION INTRAVENOUS at 10:44

## 2023-04-25 RX ADMIN — OXYCODONE 10 MG: 5 TABLET ORAL at 23:07

## 2023-04-25 RX ADMIN — PROPOFOL 150 MG: 10 INJECTION, EMULSION INTRAVENOUS at 13:15

## 2023-04-25 RX ADMIN — PHENYLEPHRINE HYDROCHLORIDE 100 MCG: 10 INJECTION INTRAVENOUS at 13:57

## 2023-04-25 RX ADMIN — SUGAMMADEX 100 MG: 100 INJECTION, SOLUTION INTRAVENOUS at 15:10

## 2023-04-25 RX ADMIN — FENTANYL CITRATE 50 MCG: 0.05 INJECTION, SOLUTION INTRAMUSCULAR; INTRAVENOUS at 13:37

## 2023-04-25 RX ADMIN — ALPRAZOLAM 0.25 MG: 0.25 TABLET ORAL at 20:05

## 2023-04-25 RX ADMIN — Medication 5 MG: at 20:05

## 2023-04-25 RX ADMIN — TRANEXAMIC ACID 1950 MG: 650 TABLET ORAL at 12:08

## 2023-04-25 RX ADMIN — NITROGLYCERIN 40 MCG: 20 INJECTION INTRAVENOUS at 14:13

## 2023-04-25 RX ADMIN — MORPHINE SULFATE 2 MG: 2 INJECTION, SOLUTION INTRAMUSCULAR; INTRAVENOUS at 20:04

## 2023-04-25 RX ADMIN — ACETAMINOPHEN 650 MG: 325 TABLET ORAL at 17:29

## 2023-04-25 RX ADMIN — OXYCODONE 10 MG: 5 TABLET ORAL at 17:29

## 2023-04-25 RX ADMIN — PHENYLEPHRINE HYDROCHLORIDE 100 MCG: 10 INJECTION INTRAVENOUS at 14:51

## 2023-04-25 RX ADMIN — LIDOCAINE HYDROCHLORIDE 5 ML: 10 INJECTION, SOLUTION EPIDURAL; INFILTRATION; INTRACAUDAL; PERINEURAL at 13:16

## 2023-04-25 RX ADMIN — ARFORMOTEROL TARTRATE 15 MCG: 15 SOLUTION RESPIRATORY (INHALATION) at 00:09

## 2023-04-25 ASSESSMENT — PAIN SCALES - GENERAL
PAINLEVEL_OUTOF10: 8
PAINLEVEL_OUTOF10: 0
PAINLEVEL_OUTOF10: 8
PAINLEVEL_OUTOF10: 8
PAINLEVEL_OUTOF10: 4
PAINLEVEL_OUTOF10: 9
PAINLEVEL_OUTOF10: 8

## 2023-04-25 ASSESSMENT — PAIN DESCRIPTION - DESCRIPTORS
DESCRIPTORS: STABBING
DESCRIPTORS: THROBBING
DESCRIPTORS: BURNING

## 2023-04-25 ASSESSMENT — PAIN DESCRIPTION - ORIENTATION
ORIENTATION: LEFT

## 2023-04-25 ASSESSMENT — PAIN DESCRIPTION - LOCATION
LOCATION: HIP

## 2023-04-25 ASSESSMENT — PAIN - FUNCTIONAL ASSESSMENT: PAIN_FUNCTIONAL_ASSESSMENT: ACTIVITIES ARE NOT PREVENTED

## 2023-04-25 NOTE — ANESTHESIA PRE PROCEDURE
Provider, MD   montelukast (SINGULAIR) 10 MG tablet Take 1 tablet by mouth daily 11/19/19   Historical Provider, MD   ALPRAZolam Maryam Bartlett) 0.25 MG tablet Take 1 tablet by mouth nightly as needed. Historical Provider, MD   PROVENTIL  (90 BASE) MCG/ACT inhaler Inhale 2 puffs into the lungs every 6 hours as needed 3/28/13   Historical Provider, MD       Current medications:    No current facility-administered medications for this visit. No current outpatient medications on file.      Facility-Administered Medications Ordered in Other Visits   Medication Dose Route Frequency Provider Last Rate Last Admin    [MAR Hold] dexamethasone (PF) (DECADRON) injection 8 mg  8 mg IntraVENous Once Sharmaine Ponce MD        Kaley Purchase Hold] lactated ringers IV soln infusion   IntraVENous Continuous Sharmaine Ponce  mL/hr at 04/25/23 1137 NoRateChange at 04/25/23 1137    [MAR Hold] sodium chloride flush 0.9 % injection 5-40 mL  5-40 mL IntraVENous 2 times per day Sharmaine Ponce MD       [facility] Kaley Purchase Hold] sodium chloride flush 0.9 % injection 5-40 mL  5-40 mL IntraVENous PRN Sharmaine Ponce MD        Kaley Purchase Hold] 0.9 % sodium chloride infusion   IntraVENous PRN Sharmaine Ponce MD        [facility] Purchase Hold] ceFAZolin (ANCEF) 2,000 mg in sterile water 20 mL IV syringe  2,000 mg IntraVENous On Call to OR Sharmaine Ponce MD        lactated ringers IV soln infusion   IntraVENous Continuous Alejandra Valdez MD        fentaNYL (SUBLIMAZE) injection 50 mcg  50 mcg IntraVENous Q1H PRN Sharmaine Ponce MD   50 mcg at 04/25/23 1139    naloxone [facility]) injection 0.4 mg  0.4 mg IntraVENous PRN Sharmaine Ponce MD        ondansetron [facility]) injection 4 mg  4 mg IntraVENous Q6H PRN Sharmaine Ponce MD        ARIPiprazole (ABILIFY) tablet 2 mg  2 mg Oral Daily Sharmaine Ponce MD        aspirin EC tablet 81 mg  81 mg Oral Daily Sharmaine Ponce MD

## 2023-04-25 NOTE — H&P
[facility] Hospitalists      Hospitalist - History & Physical      PCP: Johnson Slaughter DO    Date of Admission: 4/24/2023    Date of Service: 4/24/2023    Chief Complaint:  Left hip pain    History Of Present Illness: The patient is a 72 y.o. female who presented to [facility] ED for evaluation of left hip pain. Pt has history of bilateral hip replacement surgery, HTN, chronic back pain and osteoporosis. Pt felt left hip dislocate at restaurant tonight bending down to retrieve a cellphone that fell under a bench. She denies fall, fevers, dysuria as well as systemic symptoms of illness. In ED, left hip reduction by ED physician. Pt was placed in knee immobilizer. Concern for hardware problem post reduction and Dr. Quiana Ardon was consulted. Wbc 17k, hgb 11, platelets 647K, UA micro 1+ bacteria, 9 wbc, sodium 134, potassium 3.7, creatinine 0.6/bun 8, glucose 122. Left hip/pelvis-There has been reduction of the left hip prosthesis. Radiodense ring which previously paralleled the acetabular prosthesis has changed position. This is   concerning for malpositioned hardware component. Orthopedic surgical consultation is recommended. Pt is admitted to hospitalist service for further evaluation in consultation with orthopedic surgery.      Past Medical History:        Diagnosis Date    Anemia     Anxiety     Asthma     CAD (coronary artery disease)     CAD (coronary artery disease)     Cervical stenosis of spine     Chest tightness or pressure 02/02/2014 2/2/2014  lexiscan Positive for inferior apical myocardial ischemia, EF 62%, 2% ischemic myocardium on stress, low risk findings, AUC indication 15, AUC score 4     Chronic back pain     Chronic kidney disease, stage III (moderate) ([facility])     GFR 40 mL/minute 7/10/15. no problems now    CPAP (continuous positive airway pressure) dependence     Gastritis     History of blood transfusion     Hyperlipidemia 01/06/2015    Hypertension     IBS (irritable bowel syndrome)

## 2023-04-25 NOTE — OP NOTE
OPERATIVE NOTE    Patient:  Shiv Castellanos    Date:  4/25/2023    Medical Record Number:  [id number]    Primary Pre-Operative Diagnosis: Dislocated constrained left total hip arthroplasty    Primary Post-Operative Diagnosis:  Same    Procedure: Open reduction change of constrained liner change of femoral head left hip  Implants:   Implant Name Type Inv. Item Serial No.  Lot No. LRB No. Used Action   LINER ACET OD52MM [id number] +4MM OFFSET HIP STD GVF POLYETH TI - [id number] Joint Component LINER ACET OD52MM [id number] +4MM OFFSET HIP STD GVF POLYETH TI  [id number] [id number] Left 1 Implanted   HEAD FEM [id number] +3MM OFFSET 11/13 TAPR STD CO CHROM CHAS - [id number] Joint Component HEAD FEM [id number] +3MM OFFSET 11/13 TAPR STD CO CHROM CHAS  [facility] [employer] [id number] Left 1 Implanted       Assistant: Orquidea Jones      Anesthesia: General    Estimated Blood Loss:  Minimal      Specimens: 3 culture sent    Complications:  None    Findings:  As above      Procedure:  Patient was brought into the operating room and general endotracheal anesthetic was placed. She was carefully placed on the [place] bed. Elected to go anteriorly because she was primarily an acetabular problem. Closure is much better from an anterior approach. The extremity was prepped with chlorhexidine alcohol and sterilely draped. Anterior incision was made over the left hip with a scalpel. Once through dermis fat soft tissue was divided down to fascia. The fascia was divided longitudinally. The interval between the sartorius and tensor was developed. The tensor was quite atrophic. The lateral circumflex vessels were ligated with the Bovie and divided. Retractors were placed around the femoral neck area. Tear capsulectomy was performed. There is a large amount of reddish-orange clear fluid in the hip joint that was sent for culture. There is a lot of metallosis debris black stained synovium. This was excised.   The

## 2023-04-25 NOTE — ANESTHESIA POSTPROCEDURE EVALUATION
Department of Anesthesiology  Postprocedure Note    Patient: Marcello Nelson  MRN: [id number]  YOB: 1950  Date of evaluation: 4/25/2023      Procedure Summary     Date: 04/25/23 Room / Location: [address]    Anesthesia Start: 1308 Anesthesia Stop: 1538    Procedure: HIP OPEN REDUCTION LINER/ HEAD (Left) Diagnosis:       Closed fracture of left hip, initial encounter ([facility] [address]      (Closed fracture of left hip, initial encounter ([facility] [address] Kayla Cali)    Surgeons: Marisol Whitley MD Responsible Provider: MICHELLE Chavez CRNA    Anesthesia Type: general ASA Status: 2          Anesthesia Type: No value filed.     Breonna Phase I: Breonna Score: 5    Breonna Phase II:        Anesthesia Post Evaluation    Patient location during evaluation: PACU  Patient participation: complete - patient participated  Level of consciousness: awake and alert  Pain score: 0  Airway patency: patent  Nausea & Vomiting: no nausea and no vomiting  Complications: no  Cardiovascular status: blood pressure returned to baseline  Respiratory status: acceptable and nasal cannula  Hydration status: euvolemic

## 2023-04-25 NOTE — PLAN OF CARE
SUBJECTIVE:    Pt case dicussed  Presented for dislocated prosthesis  Such force needed to reduce that hardware failure occurred intraprocedurally  Case d/w ortho already, needs special hardware that can be here on Thrusday      OBJECTIVE:    BP (!) 142/77   Pulse 88   Temp 97.9 °F (36.6 °C)   Resp 28   Wt 145 lb (65.8 kg)   SpO2 (!) 88%   BMI 28.32 kg/m²     Narrative   Examination: XR left hip with AP pelvis   Clinical history: Postreduction   Comparison: [email or web address]       Impression   Findings/Impression:   There has been reduction of the left hip prosthesis. Radiodense ring which   previously paralleled the acetabular prosthesis has changed position. This is   concerning for malpositioned hardware component. Orthopedic surgical   consultation is recommended. Left hip prosthesis appears otherwise well aligned. Right hip prosthesis is partially visualized. Bones are demineralized. ASSESSMENTS & PLANS:    Iatrogenic???  Left Closed Periprosthetic Femoral Neck Fracture: Initial Encounter  Admit to [facility]  Tylenol 1g PO Q8h for basal pain control  Oxycodone 2.5-5-10mg PO Mild-Moderate-Severe Pain Scale & Fentanyl IV 12.5-25-50mcg pain scale for when NPO or PO med fails  Narcan PRN patient safety   Type and Screen & PT/INR and PTT on Wednesday AM (will be ready for OR either on Wednesday AM or Thursday AM)  CBC with Diff and BMP with Mag reflex daily from tomorrow x 3  Ortho consultant already on case, order placed  NPO from [place] on Wednesday night  Jon Care  Insert Jon for perioperative usage  Bed Rest  Add-on 25-HO Vitamin D level  TXA on call to OR as per hospital system policy  Cefazolin 2g IV to OR as per hospital system policy     Chronic medical problems:  Continue home regimen as indicated, RN to call me when meds verified and in EMR to reevlauate     Supportive and Prophylactic Txx:  DVT PPx: have held [facility] [place] for now but is on NOAC  GI (PUD) PPx: not indicated  PT: defer to

## 2023-04-25 NOTE — CONSULTS
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 female who presents with the above complaint after bending over to her left side to pick her phone off the ground while sitting on a bench yesterday. Was closed reduced in the ER yesterday afternoon and admitted for observation. I did a left hip abductor tendon repair on her several years ago she developed instability. She also had some underlying arthritis of the hip. Center to [place] she had 2 subsequent attempts at grafting and repair of her hip abductors that eventually failed. She developed rare infection after 1 of those procedures that was treated with IV antibiotics and resolved. She had a left hip arthroplasty done by Dr. Farrah Aguilar in [place] about 4 years ago. She developed instability after that and went on to have a constrained liner placed. She has been fine for 4 years no problems with the hip. Leg lengths have been equal.  She does have chronic lateral hip discomfort and weakness of that hip. She uses a cane in her right hand. She had a right hip replaced in [place] last November and has done well with that.   Past Medical History:        Diagnosis Date    Anemia     Anxiety     Asthma     CAD (coronary artery disease)     CAD (coronary artery disease)     Cervical stenosis of spine     Chest tightness or pressure 2014  lexiscan Positive for inferior apical myocardial ischemia, EF 62%, 2% ischemic myocardium on stress, low risk findings, AUC indication 15, AUC score 4     Chronic back pain     Chronic kidney disease, stage III (moderate) ([facility])     GFR 40 mL/minute 7/10/15. no problems now    CPAP (continuous positive airway pressure) dependence     Gastritis     History of blood

## 2023-04-26 VITALS
SYSTOLIC BLOOD PRESSURE: 137 MMHG | HEIGHT: 60 IN | BODY MASS INDEX: 28.47 KG/M2 | WEIGHT: 145 LBS | OXYGEN SATURATION: 93 % | TEMPERATURE: 97.5 F | RESPIRATION RATE: 16 BRPM | HEART RATE: 73 BPM | DIASTOLIC BLOOD PRESSURE: 70 MMHG

## 2023-04-26 LAB
ANION GAP SERPL CALCULATED.3IONS-SCNC: 10 MMOL/L (ref 7–19)
APTT PPP: 34.9 SEC (ref 26–36.2)
BACTERIA UR CULT: ABNORMAL
BACTERIA UR CULT: ABNORMAL
BASOPHILS # BLD: 0 K/UL (ref 0–0.2)
BASOPHILS NFR BLD: 0.1 % (ref 0–1)
BUN SERPL-MCNC: 14 MG/DL (ref 8–23)
CALCIUM SERPL-MCNC: 8.3 MG/DL (ref 8.8–10.2)
CHLORIDE SERPL-SCNC: 102 MMOL/L (ref 98–111)
CO2 SERPL-SCNC: 26 MMOL/L (ref 22–29)
CREAT SERPL-MCNC: 0.9 MG/DL (ref 0.5–0.9)
EKG P AXIS: 36 DEGREES
EKG P-R INTERVAL: 154 MS
EKG Q-T INTERVAL: 384 MS
EKG QRS DURATION: 88 MS
EKG QTC CALCULATION (BAZETT): 406 MS
EKG T AXIS: 3 DEGREES
EOSINOPHIL # BLD: 0 K/UL (ref 0–0.6)
EOSINOPHIL NFR BLD: 0 % (ref 0–5)
ERYTHROCYTE [DISTWIDTH] IN BLOOD BY AUTOMATED COUNT: 14.8 % (ref 11.5–14.5)
GLUCOSE BLD-MCNC: 119 MG/DL (ref 70–99)
GLUCOSE SERPL-MCNC: 316 MG/DL (ref 74–109)
HBA1C MFR BLD: 5.5 % (ref 4–6)
HCT VFR BLD AUTO: 27.5 % (ref 37–47)
HGB BLD-MCNC: 8.5 G/DL (ref 12–16)
IMM GRANULOCYTES # BLD: 0.1 K/UL
INR PPP: 1.1 (ref 0.88–1.18)
LYMPHOCYTES # BLD: 0.7 K/UL (ref 1.1–4.5)
LYMPHOCYTES NFR BLD: 4.3 % (ref 20–40)
MCH RBC QN AUTO: 29.8 PG (ref 27–31)
MCHC RBC AUTO-ENTMCNC: 30.9 G/DL (ref 33–37)
MCV RBC AUTO: 96.5 FL (ref 81–99)
MONOCYTES # BLD: 1.2 K/UL (ref 0–0.9)
MONOCYTES NFR BLD: 7.7 % (ref 0–10)
NEUTROPHILS # BLD: 13.5 K/UL (ref 1.5–7.5)
NEUTS SEG NFR BLD: 87.4 % (ref 50–65)
ORGANISM: ABNORMAL
PERFORMED ON: ABNORMAL
PLATELET # BLD AUTO: 325 K/UL (ref 130–400)
PMV BLD AUTO: 9.8 FL (ref 9.4–12.3)
POTASSIUM SERPL-SCNC: 3.6 MMOL/L (ref 3.5–5)
PROTHROMBIN TIME: 14.2 SEC (ref 12–14.6)
RBC # BLD AUTO: 2.85 M/UL (ref 4.2–5.4)
SODIUM SERPL-SCNC: 138 MMOL/L (ref 136–145)
WBC # BLD AUTO: 15.4 K/UL (ref 4.8–10.8)

## 2023-04-26 PROCEDURE — 80048 BASIC METABOLIC PNL TOTAL CA: CPT

## 2023-04-26 PROCEDURE — 82962 GLUCOSE BLOOD TEST: CPT

## 2023-04-26 PROCEDURE — 83036 HEMOGLOBIN GLYCOSYLATED A1C: CPT

## 2023-04-26 PROCEDURE — 97165 OT EVAL LOW COMPLEX 30 MIN: CPT

## 2023-04-26 PROCEDURE — 36415 COLL VENOUS BLD VENIPUNCTURE: CPT

## 2023-04-26 PROCEDURE — 85025 COMPLETE CBC W/AUTO DIFF WBC: CPT

## 2023-04-26 PROCEDURE — 85730 THROMBOPLASTIN TIME PARTIAL: CPT

## 2023-04-26 PROCEDURE — 97530 THERAPEUTIC ACTIVITIES: CPT

## 2023-04-26 PROCEDURE — 6360000002 HC RX W HCPCS: Performed by: ORTHOPAEDIC SURGERY

## 2023-04-26 PROCEDURE — 97535 SELF CARE MNGMENT TRAINING: CPT

## 2023-04-26 PROCEDURE — 97161 PT EVAL LOW COMPLEX 20 MIN: CPT

## 2023-04-26 PROCEDURE — 6370000000 HC RX 637 (ALT 250 FOR IP): Performed by: ORTHOPAEDIC SURGERY

## 2023-04-26 PROCEDURE — 97116 GAIT TRAINING THERAPY: CPT

## 2023-04-26 PROCEDURE — 94640 AIRWAY INHALATION TREATMENT: CPT

## 2023-04-26 PROCEDURE — 2580000003 HC RX 258: Performed by: ORTHOPAEDIC SURGERY

## 2023-04-26 PROCEDURE — 94760 N-INVAS EAR/PLS OXIMETRY 1: CPT

## 2023-04-26 PROCEDURE — 85610 PROTHROMBIN TIME: CPT

## 2023-04-26 RX ORDER — INSULIN LISPRO 100 [IU]/ML
0-4 INJECTION, SOLUTION INTRAVENOUS; SUBCUTANEOUS NIGHTLY
Status: DISCONTINUED | OUTPATIENT
Start: 2023-04-26 | End: 2023-04-26 | Stop reason: HOSPADM

## 2023-04-26 RX ORDER — DEXTROSE MONOHYDRATE 100 MG/ML
INJECTION, SOLUTION INTRAVENOUS CONTINUOUS PRN
Status: DISCONTINUED | OUTPATIENT
Start: 2023-04-26 | End: 2023-04-26 | Stop reason: HOSPADM

## 2023-04-26 RX ORDER — INSULIN LISPRO 100 [IU]/ML
0-4 INJECTION, SOLUTION INTRAVENOUS; SUBCUTANEOUS
Status: DISCONTINUED | OUTPATIENT
Start: 2023-04-26 | End: 2023-04-26 | Stop reason: HOSPADM

## 2023-04-26 RX ORDER — OXYCODONE HYDROCHLORIDE 10 MG/1
10 TABLET ORAL EVERY 6 HOURS PRN
Qty: 12 TABLET | Refills: 0 | Status: SHIPPED | OUTPATIENT
Start: 2023-04-26 | End: 2023-04-29

## 2023-04-26 RX ADMIN — BUDESONIDE INHALATION 500 MCG: 0.5 SUSPENSION RESPIRATORY (INHALATION) at 06:24

## 2023-04-26 RX ADMIN — LOSARTAN POTASSIUM 100 MG: 100 TABLET, FILM COATED ORAL at 08:59

## 2023-04-26 RX ADMIN — CETIRIZINE HYDROCHLORIDE 10 MG: 10 TABLET, FILM COATED ORAL at 08:58

## 2023-04-26 RX ADMIN — HYDROCHLOROTHIAZIDE 25 MG: 25 TABLET ORAL at 09:05

## 2023-04-26 RX ADMIN — ACETAMINOPHEN 650 MG: 325 TABLET ORAL at 11:05

## 2023-04-26 RX ADMIN — OXYCODONE 10 MG: 5 TABLET ORAL at 04:17

## 2023-04-26 RX ADMIN — CEFAZOLIN 2000 MG: 2 INJECTION, POWDER, FOR SOLUTION INTRAMUSCULAR; INTRAVENOUS at 04:16

## 2023-04-26 RX ADMIN — ASPIRIN 325 MG: 325 TABLET, COATED ORAL at 08:58

## 2023-04-26 RX ADMIN — MORPHINE SULFATE 2 MG: 2 INJECTION, SOLUTION INTRAMUSCULAR; INTRAVENOUS at 12:47

## 2023-04-26 RX ADMIN — ARFORMOTEROL TARTRATE 15 MCG: 15 SOLUTION RESPIRATORY (INHALATION) at 06:24

## 2023-04-26 RX ADMIN — ACETAMINOPHEN 650 MG: 325 TABLET ORAL at 04:17

## 2023-04-26 RX ADMIN — OXYCODONE 10 MG: 5 TABLET ORAL at 09:06

## 2023-04-26 RX ADMIN — ARIPIPRAZOLE 2 MG: 2 TABLET ORAL at 08:58

## 2023-04-26 RX ADMIN — OXYBUTYNIN CHLORIDE 5 MG: 5 TABLET ORAL at 08:58

## 2023-04-26 RX ADMIN — MORPHINE SULFATE 2 MG: 2 INJECTION, SOLUTION INTRAMUSCULAR; INTRAVENOUS at 07:31

## 2023-04-26 RX ADMIN — SODIUM CHLORIDE, PRESERVATIVE FREE 10 ML: 5 INJECTION INTRAVENOUS at 09:09

## 2023-04-26 ASSESSMENT — PAIN DESCRIPTION - DESCRIPTORS
DESCRIPTORS: ACHING;THROBBING;DISCOMFORT
DESCRIPTORS: DISCOMFORT;ACHING
DESCRIPTORS: ACHING;DISCOMFORT
DESCRIPTORS: ACHING;SORE

## 2023-04-26 ASSESSMENT — PAIN DESCRIPTION - LOCATION
LOCATION: HIP
LOCATION: HIP
LOCATION: LEG;HIP
LOCATION: HIP

## 2023-04-26 ASSESSMENT — PAIN DESCRIPTION - ORIENTATION
ORIENTATION: LEFT
ORIENTATION: RIGHT
ORIENTATION: LEFT
ORIENTATION: LEFT

## 2023-04-26 ASSESSMENT — PAIN - FUNCTIONAL ASSESSMENT
PAIN_FUNCTIONAL_ASSESSMENT: PREVENTS OR INTERFERES SOME ACTIVE ACTIVITIES AND ADLS
PAIN_FUNCTIONAL_ASSESSMENT: ACTIVITIES ARE NOT PREVENTED
PAIN_FUNCTIONAL_ASSESSMENT: ACTIVITIES ARE NOT PREVENTED

## 2023-04-26 ASSESSMENT — PAIN SCALES - GENERAL
PAINLEVEL_OUTOF10: 7
PAINLEVEL_OUTOF10: 9
PAINLEVEL_OUTOF10: 5
PAINLEVEL_OUTOF10: 7

## 2023-04-26 ASSESSMENT — PAIN DESCRIPTION - PAIN TYPE: TYPE: ACUTE PAIN

## 2023-04-26 NOTE — CARE COORDINATION
Spoke with patient regarding MD orders for [place] services. Patient agreeable and has chosen [facility]. Referral Faxed. [address] [place] [address] [phone]. [phone]. Please notify [facility] when patient discharges and fax DC Summary,  DC med list and any new [place] orders. The Patient and/or patient representative was provided with a choice of provider and agrees   with the discharge plan. [x] Yes [] No    Freedom of choice list was provided with basic dialogue that supports the patient's individualized plan of care/goals, treatment preferences and shares the quality data associated with the providers.  [x] Yes [] No  Electronically signed by Rocio Plaza on 4/26/2023 at 11:27 AM

## 2023-04-26 NOTE — DISCHARGE SUMMARY
[place], [place], [address]    DEPARTMENT OF HOSPITALIST MEDICINE      DISCHARGE SUMMARY:      PATIENT NAME:  Abbey Hernandez  :  1950  MRN:  [id number]    Admission Date:   2023  2:23 PM Attending: Sugar Godinez MD   Discharge Date:   2023              PCP: Denton Suarez DO  Length of Stay: 2 days     Chief Complaint on Admission:   Chief Complaint   Patient presents with    Hip Pain     Left; bent down and felt a pop        Consultants:     Saul Nelson TO CASE MANAGEMENT  IP CONSULT TO [address]  COURSE  AND  TREATMENT:  71 yo F with bilateral hip replacements, HTN, chronic back pain, and osteoporosis who presented to [facility] ED with left hip dislocation after bending down to  a fallen cellphone. Left hip was reduced by ED physician but there was concern for hardware failure related to procedure. Orthopedics was consulted with plan for OR. Pt admitted to hospitalist service for further management. Went to OR on  for open reduction change of constrained liner of femoral head left hip. Did well post-op without complication. Worked with PT. Pt discharged home in stable condition with home health PT. She will continue  mg daily for DVT prophylaxis until she follows up with orthopedics in 4 weeks. Discharge Problem List:   Principal Problem:    Periprosthetic fracture around internal prosthetic left hip joint, initial encounter [facility])  Active Problems:    Essential hypertension    Hip dislocation, left (HCC)    Leukocytosis  Resolved Problems:    * No resolved hospital problems. *     Dislocated left constrained hip arthroplasty  -- Orthopedics consulted  -- Plan for OR on ; plans to reduce and revise the poly liner and exchange the locking ring and head. More revision may be required.   -- PT/OT afterwards  -- Pain control  -- DVT prophylaxis after OR     HTN  --

## 2023-04-26 NOTE — DISCHARGE INSTRUCTIONS
Orthopedic [place] [facility]  Dr. Aman Landry     Total Hip & Bipolar Replacement  Home Instructions    To prevent blood clots, you have been placed on the following medication:  Aspirin 81 mg twice a day for four weeks  Surgical Site Care:  No dressing is needed  May get wet in shower   Home physical therapy 3 days a week and a once a week nurse will be arranged before you get home  Weight Bearing Status:  Full unless you were told otherwise, but exercise hip precautions  Use a walker or crutches as needed  Precautions  Per Physical Therapy handout  Don't walk for exercise (The longer you are on your feet the more sore you will be)  Stay close to home  You may go for short car rides  Pain Medications  A prescription was sent electronically to your pharmacy and should be ready for pickup  Wean off pain medications as you deem appropriate as long as pain is under control  Take tylenol instead of the pain medicine as you improve  Please take a stool softener such as Colace to prevent constipation  Do not drive for two weeks  Cold packs  May be used 3 times daily for 15-30 minutes as necessary  Be sure to have a barrier (cloth, clothing, towel) between the site and the ice pack to prevent frostbite  Contact office if  Increased redness, swelling, drainage of any kind, and/or severe pain at surgery site. These could signify an infection. Calf or thigh tenderness to touch as well as increased swelling or redness. This could signify a clot. Any rash appears, increased  or new onset nausea/vomiting occur. This may indicate a reaction to a medication. Phone # [phone] ext [phone]. Leave a message for my assistant Delmi. She will return your call promptly  If you have an emergency text me at [phone] and tell me your name and problem  Follow up with Surgeon at scheduled appointment time. Thanks for the opportunity to care for you!

## 2023-04-26 NOTE — ANESTHESIA POSTPROCEDURE EVALUATION
Department of Anesthesiology  Postprocedure Note    Patient: Katie Doll  MRN: [id number]  YOB: 1950  Date of evaluation: 4/26/2023      Procedure Summary     Date: 04/25/23 Room / Location: [address]    Anesthesia Start: 1308 Anesthesia Stop: 1538    Procedure: HIP OPEN REDUCTION LINER/ HEAD (Left) Diagnosis:       Closed fracture of left hip, initial encounter ([facility] [address]      (Closed fracture of left hip, initial encounter ([facility] [address] Jen Diallo)    Surgeons: Simon Wagner MD Responsible Provider: MICHELLE Matthews CRNA    Anesthesia Type: general ASA Status: 2          Anesthesia Type: No value filed. Breonna Phase I: Breonna Score: 9    Breonna Phase II:        Anesthesia Post Evaluation    Patient location during evaluation: bedside  Patient participation: complete - patient participated  Level of consciousness: awake and alert  Pain score: 0  Airway patency: patent  Nausea & Vomiting: no nausea and no vomiting  Complications: no  Cardiovascular status: blood pressure returned to baseline  Respiratory status: acceptable and nasal cannula  Hydration status: stable  Comments: Stable postop day 1, no evidence of anesthesia complications.  Neuroaxial exam returned to normal/baseline

## 2023-04-27 ENCOUNTER — CARE COORDINATION (OUTPATIENT)
Dept: CASE MANAGEMENT | Age: 73
End: 2023-04-27

## 2023-04-27 NOTE — CARE COORDINATION
[facility] Care Transitions Initial Follow Up Call    Call within 2 business days of discharge: Yes    Patient Current Location:  [facility] Transition Nurse contacted the patient by telephone to perform post hospital discharge assessment. Verified name and  with patient as identifiers. Provided introduction to self, and explanation of the Care Transition Nurse role. Patient: Yvetta Hatchet Patient : 1950   MRN: [id number]  Reason for Admission: Revision of Left SARATH  Discharge Date: 23 RARS: Readmission Risk Score: 12.9      Last Discharge  Street       Date Complaint Diagnosis Description Type Department Provider    23 Hip Pain History of revision of total replacement of left hip joint . .. ED to Hosp-Admission (Discharged) (ADMITTED) [facility] 5 SURG Jennifer Queen MD; Rochelle Oh . .. Was this an external facility discharge? No     Challenges to be reviewed by the provider   Additional needs identified to be addressed with provider: No       Method of communication with provider: none. Care Transition Nurse reviewed discharge instructions, medical action plan, and red flags with patient who verbalized understanding. The patient was given an opportunity to ask questions and does not have any further questions or concerns at this time. Were discharge instructions available to patient? Yes. Reviewed appropriate site of care based on symptoms and resources available to patient including:   PCP  Specialist  Urgent care clinics  [facility]  When to call [address] The patient agrees to contact the PCP office for questions related to their healthcare. Advance Care Planning:   Does patient have an Advance Directive: not on file; education provided. Advance Care Planning   Healthcare Decision Maker:  Patient does not have a current health care decision maker listed and is not able to name one at this time.       Medication reconciliation was performed with patient,

## 2023-04-28 ENCOUNTER — TELEPHONE (OUTPATIENT)
Dept: INPATIENT UNIT | Age: 73
End: 2023-04-28

## 2023-04-29 LAB
BACTERIA SPEC AEROBE CULT: NORMAL
BACTERIA SPEC ANAEROBE CULT: NORMAL
GRAM STN SPEC: NORMAL

## 2023-04-30 LAB
BACTERIA BLD CULT ORG #2: NORMAL
BACTERIA BLD CULT: NORMAL

## 2023-05-01 NOTE — PROGRESS NOTES
24 hr chart check completed   
CLINICAL PHARMACY NOTE: MEDS TO BEDS    Total # of Prescriptions Filled: 1   The following medications were delivered to the patient:  Current Discharge Medication List        START taking these medications    Details   oxyCODONE (OXY-IR) 10 MG immediate release tablet Take 1 tablet by mouth every 6 hours as needed for Pain for up to 3 days. Max Daily Amount: 40 mg  Qty: 12 tablet, Refills: 0    Comments: Reduce doses taken as pain becomes manageable  Associated Diagnoses: Dislocation of left hip, initial encounter [facility])             Additional Documentation:     Delivered Rx's to patients room. Gave Rx's to patient. Paid with credit card.
PHYSICAL THERAPY    Unable to see pt at this time due to an active bedrest order. Please discontinue to begin mobility assessment.     Electronically signed by Pio Rodrigues PT on 4/26/2023 at 7:06 AM
Physical Therapy  Name: Susana Omalley  MRN:  [id number]  Date of service:  4/26/2023 04/26/23 1446   Restrictions/Precautions   Restrictions/Precautions Weight Bearing   Lower Extremity Weight Bearing Restrictions   Left Lower Extremity Weight Bearing Weight Bearing As Tolerated   Position Activity Restriction   Hip Precautions Posterior hip precautions   Subjective   Subjective Pt up in recliner, agrees to walk. Pain Assessment   Pain Assessment 0-10   Pain Level 7   Pain Location Hip   Pain Orientation Left   Pain Descriptors Aching; Sore   Functional Pain Assessment Prevents or interferes some active activities and ADLs   Pain Type Acute pain   Bed Mobility   Sit to Supine Minimal assistance   Transfers   Sit to Stand Contact guard assistance   Stand to Sit Contact guard assistance   Ambulation   Surface Level tile   Device Rolling Walker   Assistance Contact guard assistance   Quality of Gait mildly antalgic   Gait Deviations Decreased step length;Decreased step height;Slow Marilu   Distance 25' x2, 10'   Stairs/Curb   Stairs? Yes   Stairs   # Steps  5  (x2 reps)   Stairs Height 4\"  (6\")   Rails Bilateral   Device No Device   Assistance Contact guard assistance   Comment vc's for correct sequencing   Short Term Goals   Time Frame for Short Term Goals 2 wks   Short Term Goal 1  FT WITH RW, SBA   Short Term Goal 2 STEPS, CGA   Conditions Requiring Skilled Therapeutic Intervention   Body Structures, Functions, Activity Limitations Requiring Skilled Therapeutic Intervention Decreased functional mobility ; Decreased strength;Decreased safe awareness;Decreased endurance;Decreased balance; Increased pain   Assessment Pt doing well with overall mobility, able to amb increased distance with rwx. Instructed in ascending/descending steps with CGA and vc's for correct sequencing. Pt assisted to BR then requested to return to bed.    Activity Tolerance   Activity Tolerance Patient tolerated treatment well   PT Plan
-- --   04/24/23 1646 (!) 158/80 -- -- 89 26 95 % -- --   04/24/23 1643 (!) 165/84 -- -- 90 22 95 % -- --   04/24/23 1640 (!) 161/90 -- -- 88 18 98 % -- --   04/24/23 1638 (!) 180/92 -- -- 91 18 98 % -- --   04/24/23 1635 (!) 180/97 -- -- 89 20 100 % -- --   04/24/23 1632 (!) 167/93 -- -- 90 28 100 % -- --   04/24/23 1629 (!) 114/94 -- -- 86 21 99 % -- --   04/24/23 1627 (!) 112/92 -- -- 95 16 96 % -- --   04/24/23 1625 (!) 122/44 -- -- 97 17 99 % -- --   04/24/23 1623 (!) 155/94 -- -- 95 15 98 % -- --   04/24/23 1619 (!) 173/92 -- -- 95 19 97 % -- --   04/24/23 1616 (!) 184/99 -- -- 96 21 95 % -- --   04/24/23 1613 (!) 193/99 -- -- 98 24 98 % -- --   04/24/23 1609 (!) 185/98 -- -- 99 24 95 % -- --   04/24/23 1602 (!) 147/83 -- -- 92 14 95 % -- --   04/24/23 1545 -- -- -- -- -- 92 % -- --   04/24/23 1422 (!) 154/99 97.9 °F (36.6 °C) -- 60 18 95 % -- 145 lb (65.8 kg)       I/O last 3 completed shifts:  In: -   Out: 1200 [Urine:1200]  No intake/output data recorded. Physical Exam  Constitutional:       General: She is not in acute distress. Appearance: She is not toxic-appearing. Cardiovascular:      Rate and Rhythm: Normal rate and regular rhythm. Pulses: Normal pulses. Heart sounds: Normal heart sounds. Pulmonary:      Effort: Pulmonary effort is normal. No respiratory distress. Breath sounds: Normal breath sounds. Abdominal:      General: Abdomen is flat. Bowel sounds are normal. There is no distension. Palpations: Abdomen is soft. Tenderness: There is no abdominal tenderness.            Lab Review   Recent Results (from the past 24 hour(s))   Urinalysis    Collection Time: 04/24/23  6:30 PM   Result Value Ref Range    Color, UA YELLOW Straw/Yellow    Clarity, UA TURBID (A) Clear    Glucose, Ur 100 (A) Negative mg/dL    Bilirubin Urine Negative Negative    Ketones, Urine Negative Negative mg/dL    Specific Gravity, UA 1.014 1.005 - 1.030    Blood, Urine Negative Negative    pH,
ADL status; Decreased balance  Assessment: Will progress as tolerated  Treatment Diagnosis: Periprosthetic fx, s/p L THR  Prognosis: Good  Decision Making: Low Complexity  REQUIRES OT FOLLOW-UP: Yes  Activity Tolerance  Activity Tolerance: Patient Tolerated treatment well        Plan   Occupational Therapy Plan  Times Per Week: 3-5x/week  Times Per Day: Once a day     Restrictions  Restrictions/Precautions  Restrictions/Precautions: Weight Bearing  Lower Extremity Weight Bearing Restrictions  Left Lower Extremity Weight Bearing: Weight Bearing As Tolerated  Position Activity Restriction  Hip Precautions: Posterior hip precautions    Subjective   General  Chart Reviewed: Yes  Patient assessed for rehabilitation services?: Yes  Family / Caregiver Present: No  Diagnosis: Perirprosthetic L THR  General Comment  Comments: Pt. ready to get up OOB     Social/Functional History  Social/Functional History  Lives With: Alone  Home Access: Stairs to enter with rails  Home Equipment: Walker, rolling  ADL Assistance: Independent  Ambulation Assistance: Independent  Transfer Assistance: Independent       Objective   Heart Rate: 73  Heart Rate Source: Monitor  BP: 137/70  BP Location: Left upper arm  Patient Position: Supine  MAP (Calculated): 92  Resp: 16  SpO2: 93 %  O2 Device: None (Room air)                      AROM: Within functional limits  Strength:  Within functional limits  ADL  Feeding: Independent  Grooming: Independent  UE Bathing: Supervision  LE Bathing: Minimal assistance  UE Dressing: Supervision  LE Dressing: Minimal assistance  Toileting: Contact guard assistance     Activity Tolerance  Activity Tolerance: Patient tolerated treatment well  Bed mobility  Supine to Sit: Contact guard assistance  Transfers  Stand Step Transfers: Contact guard assistance  Sit to stand: Contact guard assistance  Vision  Vision: Impaired  Vision Exceptions: Wears glasses at all times  Hearing  Hearing: Exceptions to [facility]  Hearing
James Sandifer MD 5/1/2023 12:09 PM
Skilled Therapeutic Intervention: Decreased functional mobility ; Decreased strength;Decreased safe awareness;Decreased endurance;Decreased balance; Increased pain  Assessment: pt WOULD BENEFIT FROM SKILLED PT IN THIS SETTING FOR POST OP L HIP SX P  Therapy Prognosis: Good  Decision Making: Medium Complexity  Requires PT Follow-Up: Yes  Activity Tolerance  Activity Tolerance: Patient tolerated treatment well     Plan   Physcial Therapy Plan  General Plan: 5-7 times per week  Therapy Duration: 2 Weeks  Current Treatment Recommendations: Strengthening, Balance training, Functional mobility training, Gait training, Stair training, Pain management, Safety education & training, Patient/Caregiver education & training, Positioning  Additional Comments: PHP'S,. WBAT  Safety Devices  Type of Devices: Call light within reach, Gait belt, Left in chair     Restrictions  Restrictions/Precautions  Restrictions/Precautions: Weight Bearing  Lower Extremity Weight Bearing Restrictions  Left Lower Extremity Weight Bearing: Weight Bearing As Tolerated  Position Activity Restriction  Hip Precautions: Posterior hip precautions     Subjective   General  Patient assessed for rehabilitation services?: Yes  Diagnosis: L hip dislocation with change of constrained liner by Dr. Sukhdev Givens. anterior approach but per Dr Sukhdev Givens op note, pT is to exercise PHP's  Follows Commands: Within Functional Limits  Subjective  Subjective: Pt able to state PHP's (has followed them previously) reports pain as controlled and is ready to amb to the BR.          Social/Functional History  Social/Functional History  Lives With: Alone  Home Access: Stairs to enter with rails  Home Equipment: Walker, rolling  ADL Assistance: Independent  Ambulation Assistance: Independent  Transfer Assistance: Independent  Vision/Hearing  Vision  Vision: Impaired  Vision Exceptions: Wears glasses at all times  Hearing  Hearing: Exceptions to [facility]  Hearing Exceptions: Hard of
concerning for malpositioned hardware component. Orthopedic surgical   consultation is recommended. Left hip prosthesis appears otherwise well aligned. Right hip prosthesis is partially visualized. Bones are demineralized. XR HIP 2-3 VW W PELVIS LEFT   Final Result   Findings/Impression:   AP view of the pelvis and additional views of the left hip were obtained. Patient is status post bilateral hip total arthroplasties. There is a s   superolateral dislocation of the left hip prosthesis. Bones are demineralized. No acute fracture is seen. Bony pelvis appears intact. Right hip prosthesis is   grossly unremarkable as visualized. Sacroiliac joints are unremarkable. FLUORO FOR SURGICAL PROCEDURES    (Results Pending)   XR HIP 2-3 VW W PELVIS LEFT    (Results Pending)         Assessment:     Status Post left Total Hip Arthroplasty. Doing well postop without complications .    Plan:     Pain control  Keep accuchecks under 200  PT/OT  DVT prophylaxis  Ice and elevate  Weightbearing as tolerated but must exercise hip precautions  I will follow up with her in the office in 4 weeks

## 2023-05-02 DIAGNOSIS — I73.9 PVD (PERIPHERAL VASCULAR DISEASE) (HCC): Primary | ICD-10-CM

## 2023-05-03 ENCOUNTER — CARE COORDINATION (OUTPATIENT)
Dept: CASE MANAGEMENT | Age: 73
End: 2023-05-03

## 2023-05-03 NOTE — CARE COORDINATION
[facility] Care Transitions Follow Up Call    Patient Current Location:  [facility] Transition Nurse contacted the patient by telephone to follow up. Patient: Denia Sanches  Patient : 1950   MRN: [id number]    Discharge Date: 23 RARS: Readmission Risk Score: 12.9      Needs to be reviewed by the provider   Additional needs identified to be addressed with provider: No       Method of communication with provider: none. Follow Up  Future Appointments   Date Time Provider Sebastien Manrique   2024  2:30 PM Theodis Mortimer, MD [facility] NEURO Neurology -     Care Transitions Subsequent and Final Call    Subsequent and Final Calls  Do you have any ongoing symptoms?: No  Have your medications changed?: No  Do you have any questions related to your medications?: No  Do you currently have any active services?: Yes  Are you currently active with any services?: Home Health  Do you have any needs or concerns that I can assist you with?: No  Identified Barriers: None  Care Transitions Interventions  Other Interventions:           Placed a call to the home and spoke with patient for an ongoing follow up call. She reported that she is doing better. She said that she had some sort of reaction to the dressing that was put over the op site after surgery, not sure if it was the dermabond or something else. She said that she has been in to see the surgeon. She is using antibiotics and hydrocortisone cream.  It is improving. She does report some improvement in the area. She said the overall swelling in the hip is improving. She said the pain is getting better, still present, but readily relieved with current pain med regimen. She said she is eating some better, appetite is not quite where she wants it to be, but is better. She said she is drinking well. She is not having any bowel or bladder issues. She is sleeping well. Home health is still seeing her. She is transitioning some to a cane.   No other

## 2023-05-10 ENCOUNTER — CARE COORDINATION (OUTPATIENT)
Dept: CASE MANAGEMENT | Age: 73
End: 2023-05-10

## 2023-05-10 NOTE — CARE COORDINATION
[facility] Care Transitions Follow Up Call    Patient Current Location:  [address] [place] [address] N    Attempted to make contact with patient/caregiver for a routine Care Transitions Coordination follow up call without success. Left a HIPAA compliant message regarding intent of call and call back information. CTN will follow up again at another time. Patient: Meseret Dietz  Patient : 1950   MRN: [id number]   Discharge Date: 23 RARS: Readmission Risk Score: 12.9    Follow Up  Future Appointments   Date Time Provider Sebastien Manrique   2024  2:30 PM Keisha Fraire MD N [address] [facility] Neurology -     Plan for next call: Second attempt at ongoing follow up.     Marsha Dailey RN

## 2023-05-17 ENCOUNTER — CARE COORDINATION (OUTPATIENT)
Dept: CASE MANAGEMENT | Age: 73
End: 2023-05-17

## 2023-05-17 NOTE — CARE COORDINATION
[facility] Care Transitions Follow Up Call    Patient Current Location:  Home: [address] [place] [address] Professor Meza [place] [address] Coordinator contacted the patient by telephone to follow up after admission on 2023. Verified name and  with patient as identifiers. Patient: Paulino Donald  Patient : 1950   MRN: [id number]  Reason for Admission:  Revision of Left SARATH  Discharge Date: 23 RARS: Readmission Risk Score: 12.9      Needs to be reviewed by the provider   Additional needs identified to be addressed with provider: No  none             Method of communication with provider: none. Patient reports she is doing really well. She has 1 more home visit. Uses a cane for mobility. Then she will start outpatient therapy. Eating and drinking well. Denies swelling, redness, sob, nvd, fever or chills. Her incision area looks really good. No urinary or bowel problems. Will continue to follow. Addressed changes since last contact:  none  Discussed follow-up appointments. If no appointment was previously scheduled, appointment scheduling offered: na.   Is follow up appointment scheduled within 7 days of discharge? Yes. Follow Up  Future Appointments   Date Time Provider Sebastien Manrique   2024  2:30 PM Corinna Serrano MD [address] [facility] Neurology -     Non-[facility] follow up appointment(s):     LPN Care Coordinator reviewed medical action plan and red flags with patient and discussed any barriers to care and/or understanding of plan of care after discharge. Discussed appropriate site of care based on symptoms and resources available to patient including: PCP  Specialist  Urgent care clinics. The patient agrees to contact the PCP office for questions related to their healthcare. Advance Care Planning:   not on file.      Patients top risk factors for readmission: functional physical ability, falls, ineffective coping, and medical condition-recent revision Left SARATH  Interventions to address risk

## 2023-05-24 ENCOUNTER — CARE COORDINATION (OUTPATIENT)
Dept: CASE MANAGEMENT | Age: 73
End: 2023-05-24

## 2023-05-24 NOTE — CARE COORDINATION
[address] [facility] Care Transitions Follow Up Call    Patient Current Location:  [place] [address] Coordinator contacted the patient by telephone to follow up after admission on 2023. Verified name and  with patient as identifiers. Patient: Elizabeth Mendez  Patient : 1950   MRN: [id number]  Reason for Admission: Revision of Left SARATH  Discharge Date: 23 RARS: Readmission Risk Score: 12.9      Needs to be reviewed by the provider   Additional needs identified to be addressed with provider: No  none             Method of communication with provider: none. Patient stated she is fine. She denies pain, swelling, sob, fever or chills. She completed [facility] PT. She will be starting outpatient therapy soon. Appetite and fluid intake is good. No urinary or bowel problems. Continues to ambulate with a cane. Patient informed this is the final call. Any medical concerns contact her PCP. No further outreach scheduled. Addressed changes since last contact:  none  Discussed follow-up appointments. If no appointment was previously scheduled, appointment scheduling offered: na.   Is follow up appointment scheduled within 7 days of discharge? Yes. Follow Up  Future Appointments   Date Time Provider Sebastien Manrique   2024  2:30 PM Marii Nogueira MD N [facility] Neurology -     Non-[facility] follow up appointment(s):     Advance Care Planning:   not on file.      Patients top risk factors for readmission: ineffective coping and medical condition-recent Revision Left SARATH  Interventions to address risk factors: Education of patient/family/caregiver/guardian to support self-management-     Offered patient enrollment in the Remote Patient Monitoring (RPM) program for in-home monitoring: NA.     Care Transitions Subsequent and Final Call    Subsequent and Final Calls  Do you have any ongoing symptoms?: No  Have your medications changed?: No  Do you have any questions related to your medications?: No  Do you currently have

## 2023-06-09 ENCOUNTER — HOSPITAL ENCOUNTER (OUTPATIENT)
Dept: GENERAL RADIOLOGY | Age: 73
Discharge: HOME | End: 2023-06-09
Payer: MEDICARE

## 2023-06-09 DIAGNOSIS — M25.532 LEFT WRIST PAIN: ICD-10-CM

## 2023-06-09 DIAGNOSIS — M79.642 HAND PAIN, LEFT: ICD-10-CM

## 2023-06-09 PROCEDURE — 73130 X-RAY EXAM OF HAND: CPT

## 2023-06-09 PROCEDURE — 73110 X-RAY EXAM OF WRIST: CPT

## 2023-06-09 PROCEDURE — 73090 X-RAY EXAM OF FOREARM: CPT

## 2023-06-23 ENCOUNTER — HOSPITAL ENCOUNTER (OUTPATIENT)
Dept: WOMENS IMAGING | Age: 73
Discharge: HOME | End: 2023-06-23
Payer: MEDICARE

## 2023-06-23 DIAGNOSIS — Z78.0 ASYMPTOMATIC MENOPAUSAL STATE: ICD-10-CM

## 2023-06-23 PROCEDURE — 77080 DXA BONE DENSITY AXIAL: CPT

## 2023-08-10 ENCOUNTER — OFFICE VISIT (OUTPATIENT)
Dept: CARDIOLOGY CLINIC | Age: 73
End: 2023-08-10
Payer: MEDICARE

## 2023-08-10 VITALS
HEART RATE: 50 BPM | DIASTOLIC BLOOD PRESSURE: 88 MMHG | HEIGHT: 61 IN | BODY MASS INDEX: 26.06 KG/M2 | SYSTOLIC BLOOD PRESSURE: 132 MMHG | WEIGHT: 138 LBS

## 2023-08-10 DIAGNOSIS — I25.10 CORONARY ARTERY DISEASE INVOLVING NATIVE CORONARY ARTERY OF NATIVE HEART WITHOUT ANGINA PECTORIS: Primary | ICD-10-CM

## 2023-08-10 DIAGNOSIS — M79.10 MYALGIA DUE TO STATIN: ICD-10-CM

## 2023-08-10 DIAGNOSIS — E78.2 MIXED HYPERLIPIDEMIA: ICD-10-CM

## 2023-08-10 DIAGNOSIS — I10 ESSENTIAL HYPERTENSION: ICD-10-CM

## 2023-08-10 DIAGNOSIS — R00.1 BRADYCARDIA: ICD-10-CM

## 2023-08-10 DIAGNOSIS — T46.6X5A MYALGIA DUE TO STATIN: ICD-10-CM

## 2023-08-10 PROCEDURE — 99214 OFFICE O/P EST MOD 30 MIN: CPT | Performed by: CLINICAL NURSE SPECIALIST

## 2023-08-10 PROCEDURE — 3017F COLORECTAL CA SCREEN DOC REV: CPT | Performed by: CLINICAL NURSE SPECIALIST

## 2023-08-10 PROCEDURE — 3079F DIAST BP 80-89 MM HG: CPT | Performed by: CLINICAL NURSE SPECIALIST

## 2023-08-10 PROCEDURE — 3075F SYST BP GE 130 - 139MM HG: CPT | Performed by: CLINICAL NURSE SPECIALIST

## 2023-08-10 PROCEDURE — 1123F ACP DISCUSS/DSCN MKR DOCD: CPT | Performed by: CLINICAL NURSE SPECIALIST

## 2023-08-10 PROCEDURE — G8399 PT W/DXA RESULTS DOCUMENT: HCPCS | Performed by: CLINICAL NURSE SPECIALIST

## 2023-08-10 PROCEDURE — 1090F PRES/ABSN URINE INCON ASSESS: CPT | Performed by: CLINICAL NURSE SPECIALIST

## 2023-08-10 PROCEDURE — G8417 CALC BMI ABV UP PARAM F/U: HCPCS | Performed by: CLINICAL NURSE SPECIALIST

## 2023-08-10 PROCEDURE — G8427 DOCREV CUR MEDS BY ELIG CLIN: HCPCS | Performed by: CLINICAL NURSE SPECIALIST

## 2023-08-10 PROCEDURE — 1036F TOBACCO NON-USER: CPT | Performed by: CLINICAL NURSE SPECIALIST

## 2023-08-10 RX ORDER — AMLODIPINE BESYLATE 5 MG/1
5 TABLET ORAL DAILY
Qty: 90 TABLET | Refills: 3 | Status: SHIPPED | OUTPATIENT
Start: 2023-08-10

## 2023-08-10 ASSESSMENT — ENCOUNTER SYMPTOMS
SHORTNESS OF BREATH: 1
EYE REDNESS: 0
VOMITING: 0
CHEST TIGHTNESS: 0
WHEEZING: 0
FACIAL SWELLING: 0
NAUSEA: 0
COUGH: 0
ABDOMINAL PAIN: 0

## 2023-08-10 NOTE — PROGRESS NOTES
[facility], [address] [place] [id number] [place] [address]  Phone: [phone]  Fax: [phone]    OFFICE VISIT:  8/10/2023    [address] [place] [address] [place]: 1950    Reason For Visit:  Manny Sampson is a 72 y.o. female who is here for 6 Month Follow-Up and Coronary Artery Disease      HPI  Patient is here for follow-up for  Mild to moderate, nonocclusive CAD  with the  following cardiac history:  2/2/2014  lexiscan Positive for inferior apical myocardial ischemia, EF 62%, 2% ischemic myocardium on stress, low risk findings, AUC indication 15, AUC score 4  2/2/2014  Cath  Mild to moderate distal disease, normal LVFX  1/21/2016  lexiscan  Positive for inferor lateral MI + myocardial ischemia, EF 69%, 12/11% ischemic myocardium on stress, low risk findings, AUC indication 15, AUC score 4  1/22/16  Cath  Mild to moderate distal disease, normal LVFX  5/7/21- DSE negative for ischemia    Patient follows with our office for history of mild to moderate nonobstructive coronary artery disease, hypertension, hyperlipidemia with statin intolerance. Other history includes asthma. She denies any cardiac symptoms such as chest pain, unusual dyspnea, orthopnea, PND, unusual edema, palpitations. She had an episode of jaw pain several months back, but there has been no recurrence    She is trying to increase her activity level and is going to the gym and riding a stationary bike couple of times per week and tolerating well. Eren Bill DO is PCP.   Jenifer Lopez has the following history as recorded in [facility]:    Patient Active Problem List    Diagnosis Date Noted    CPAP (continuous positive airway pressure) dependence 05/04/2022    Periprosthetic fracture around internal prosthetic left hip joint, initial encounter ([address]) 04/24/2023    Hip dislocation, left ([address]) 04/24/2023    Leukocytosis 04/24/2023    Myalgia due to statin 01/24/2022    Obstructive sleep apnea 10/01/2021    S/P FESS

## 2023-09-28 ENCOUNTER — TRANSCRIBE ORDERS (OUTPATIENT)
Dept: ADMINISTRATIVE | Age: 73
End: 2023-09-28

## 2023-09-28 DIAGNOSIS — Z12.31 ENCOUNTER FOR SCREENING MAMMOGRAM FOR MALIGNANT NEOPLASM OF BREAST: Primary | ICD-10-CM

## 2023-11-07 ENCOUNTER — HOSPITAL ENCOUNTER (OUTPATIENT)
Dept: WOMENS IMAGING | Age: 73
Discharge: HOME | End: 2023-11-07
Payer: MEDICARE

## 2023-11-07 DIAGNOSIS — Z12.31 ENCOUNTER FOR SCREENING MAMMOGRAM FOR MALIGNANT NEOPLASM OF BREAST: ICD-10-CM

## 2023-11-07 PROCEDURE — 77063 BREAST TOMOSYNTHESIS BI: CPT

## 2024-02-01 ENCOUNTER — OFFICE VISIT (OUTPATIENT)
Dept: OBSTETRICS AND GYNECOLOGY | Age: 74
End: 2024-02-01
Payer: MEDICARE

## 2024-02-01 VITALS
SYSTOLIC BLOOD PRESSURE: 138 MMHG | WEIGHT: 153 LBS | BODY MASS INDEX: 30.04 KG/M2 | DIASTOLIC BLOOD PRESSURE: 78 MMHG | HEIGHT: 60 IN

## 2024-02-01 DIAGNOSIS — N32.81 OAB (OVERACTIVE BLADDER): Primary | ICD-10-CM

## 2024-02-01 RX ORDER — CYCLOBENZAPRINE HCL 10 MG
TABLET ORAL
COMMUNITY
Start: 2024-01-12

## 2024-02-01 NOTE — PROGRESS NOTES
Subjective     Chief Complaint   Patient presents with    Urinary Incontinence     Pt here today as new pt with c/o urinary incontinence. Pt voices no other concerns.        Eliza Corbin is a 73 y.o. year old who presents to be seen for urinary incontinence and frequency.  Patient says she is up frequently during the night, when symptoms are worse; sometimes as many as 20 times while she is trying to sleep.  She is usually in [place] all the time and says she has accidents on a daily basis.    The patient is s/p hysterectomy, a TOTAL ABDOMINAL HYSTERECTOMY for fibroids.  She reports positive urinary incontinence and urinary frequency , but denies pelvic pain, vaginal pressure, bulge outside her body, urinary hesitancy, sensation of incomplete bladder emptying, and stool trapping.  The patient feels like the problem began about one year ago.  She is not currently sexually active.  Eliza Corbin has not had surgery for this problem in the past.    Eliza tried Oxytubynin from her PCP and says that it helped, but it gave her dry mouth and dry eyes.  She also tried Myrbetriq, but had side effects again, including hot flashes.  She drinks water, tea, and coffee - although she admits to fluid restricting to help with leakage.    Past Medical History:   Diagnosis Date    Anxiety     Chronic back pain     Family history of colonic polyps     GERD (gastroesophageal reflux disease)     Hiatal hernia     History of colon polyps     HTN (hypertension)     Hypercholesteremia     IBS (irritable bowel syndrome)     Iron deficiency anemia     PONV (postoperative nausea and vomiting)     Stroke 03/2020    Vitamin D deficiency        Current Outpatient Medications:     albuterol sulfate  (90 Base) MCG/ACT inhaler, Inhale 2 puffs As Needed., Disp: , Rfl:     ALPRAZolam (XANAX) 0.25 MG tablet, Take 1 tablet by mouth 2 (Two) Times a Day As Needed for Anxiety., Disp: , Rfl:     amLODIPine (NORVASC) 5 MG tablet, Take 1 tablet by  "mouth Daily., Disp: , Rfl:     ARIPiprazole (ABILIFY) 2 MG tablet, Take 1 tablet by mouth Daily., Disp: , Rfl:     cetirizine (zyrTEC) 10 MG tablet, Take 1 tablet by mouth Daily., Disp: , Rfl:     cloNIDine (CATAPRES) 0.1 MG tablet, Take 1 tablet by mouth As Needed., Disp: , Rfl:     Cyanocobalamin 1000 MCG/ML kit, Inject 1 mL under the skin into the appropriate area as directed Every 30 (Thirty) Days., Disp: , Rfl:     cyclobenzaprine (FLEXERIL) 10 MG tablet, , Disp: , Rfl:     DULoxetine (CYMBALTA) 30 MG capsule, Take 3 capsules by mouth Daily., Disp: , Rfl:     fluticasone (Flonase) 50 MCG/ACT nasal spray, 2 sprays into the nostril(s) as directed by provider Daily., Disp: , Rfl:     hydroCHLOROthiazide (HYDRODIURIL) 25 MG tablet, Take 1 tablet by mouth Daily., Disp: , Rfl:     HYDROcodone-acetaminophen (NORCO)  MG per tablet, Take 1 tablet by mouth Every 6 (Six) Hours As Needed for Moderate Pain., Disp: , Rfl:     irbesartan (AVAPRO) 300 MG tablet, Take 1 tablet by mouth Every Night., Disp: , Rfl:     montelukast (SINGULAIR) 10 MG tablet, Take 1 tablet by mouth Every Night., Disp: , Rfl:   Family History   Problem Relation Age of Onset    Colon polyps Father         > 60 years old    Brain cancer Brother     Colon cancer Neg Hx     Esophageal cancer Neg Hx     Liver cancer Neg Hx     Liver disease Neg Hx     Rectal cancer Neg Hx     Stomach cancer Neg Hx      Social History     Socioeconomic History    Marital status:    Tobacco Use    Smoking status: Never    Smokeless tobacco: Never   Substance and Sexual Activity    Alcohol use: Yes     Comment: Occasionally     Drug use: Never    Sexual activity: Not Currently     Partners: Male     Birth control/protection: Post-menopausal, Hysterectomy     Allergies   Allergen Reactions    Diovan [Valsartan] Other (See Comments)     \"Kidney failure\"    Meloxicam Other (See Comments)     \"Kidney failure\"    Atorvastatin Other (See Comments)     Body hurts when " "taking    Declomycin [Demeclocycline] Rash       Family History   Problem Relation Age of Onset    Colon polyps Father         > 60 years old    Brain cancer Brother     Colon cancer Neg Hx     Esophageal cancer Neg Hx     Liver cancer Neg Hx     Liver disease Neg Hx     Rectal cancer Neg Hx     Stomach cancer Neg Hx      Review of Systems   Constitutional:  Negative for activity change and unexpected weight change.   HENT:          Significant dry mouth and dry eyes with oxybutynin   Genitourinary:  Positive for enuresis, frequency and urgency.        OAB symptoms improved with oxybutynin           Objective   /78   Ht 152.4 cm (60\")   Wt 69.4 kg (153 lb)   BMI 29.88 kg/m²     Physical Exam  Vitals and nursing note reviewed.   Constitutional:       General: She is not in acute distress.     Appearance: Normal appearance. She is well-developed and normal weight.   HENT:      Head: Normocephalic and atraumatic.   Neck:      Thyroid: No thyromegaly.   Pulmonary:      Effort: Pulmonary effort is normal.   Genitourinary:     Comments: No significant prolapse on exam  Musculoskeletal:         General: Normal range of motion.      Cervical back: Normal range of motion.   Skin:     General: Skin is warm and dry.   Neurological:      Mental Status: She is alert and oriented to person, place, and time.   Psychiatric:         Mood and Affect: Mood normal.         Behavior: Behavior normal.         Thought Content: Thought content normal.         Judgment: Judgment normal.         Imaging   No data reviewed       Assessment & Plan    Diagnoses and all orders for this visit:    1. OAB (overactive bladder) (Primary): Patient with OAB that has responded positively to anticholinergic medication, but she says dry mouth and dry eyes are intolerable.  She presented to discuss other treatment options.  Patient has seen video brochure for Interstim and we have discussed the in-office trial version of the device.  The patient " likes the idea of a treatment modality that will not have side effects and will return on Feb 28th since that is the next day we are scheduled to place temporary devices.  All of her questions have been answered.         Shivani Dominguez MD  2/1/2024  11:49 CST

## 2024-02-08 ENCOUNTER — TELEPHONE (OUTPATIENT)
Dept: OBSTETRICS AND GYNECOLOGY | Age: 74
End: 2024-02-08
Payer: MEDICARE

## 2024-02-08 NOTE — TELEPHONE ENCOUNTER
Attempted to reach pt re: interstim trial in the office. We are planning on 2-28-24. Left message for pt to return call to office.

## 2024-02-27 ENCOUNTER — TELEPHONE (OUTPATIENT)
Dept: OBSTETRICS AND GYNECOLOGY | Age: 74
End: 2024-02-27
Payer: MEDICARE

## 2024-02-27 NOTE — TELEPHONE ENCOUNTER
Pt called needing to reschedule her Interstim appt for tomorrow. I advised her that someone would give her a call back once this is rescheduled as we do have to speak with our rep.

## 2024-02-27 NOTE — TELEPHONE ENCOUNTER
Attempted to reach pt re: in office interstim. Will be back in contact with pt once I speak with the interstim rep tomorrow. Left message for pt to return call to office.

## 2024-02-29 ENCOUNTER — TELEPHONE (OUTPATIENT)
Dept: OBSTETRICS AND GYNECOLOGY | Age: 74
End: 2024-02-29
Payer: MEDICARE

## 2024-02-29 NOTE — TELEPHONE ENCOUNTER
This patient was on the cancellation list on 02/28/24 for interstim. Is this something that you need to get rescheduled?

## 2024-02-29 NOTE — TELEPHONE ENCOUNTER
Yes. I have her name and will be calling her when I set up a time with the [facility] reps. Thank you

## 2024-03-11 ENCOUNTER — TELEPHONE (OUTPATIENT)
Dept: OBSTETRICS AND GYNECOLOGY | Age: 74
End: 2024-03-11
Payer: MEDICARE

## 2024-03-21 NOTE — TELEPHONE ENCOUNTER
Pt notified that we have to coordinate a time with the [facility] rep so that we can get this rescheduled for pt to come back in. Told pt that as soon as I have that information I will call and let her know. Pt understood.

## 2024-04-16 ENCOUNTER — TELEPHONE (OUTPATIENT)
Dept: OBSTETRICS AND GYNECOLOGY | Age: 74
End: 2024-04-16
Payer: MEDICARE

## 2024-04-16 NOTE — TELEPHONE ENCOUNTER
Attempted to reach pt to get her rescheduled for interstim trial on 5-15-24. Left message for pt to return call to office.

## 2024-04-18 NOTE — TELEPHONE ENCOUNTER
Pt notified of interstim trial day on 5-15-24 and to arrive at 2:00pm. Pt notified to stop oxybutynin 3 days prior to her trial. Pt understood.

## 2024-05-08 ENCOUNTER — HOSPITAL ENCOUNTER (OUTPATIENT)
Dept: VASCULAR LAB | Age: 74
Discharge: HOME | End: 2024-05-10
Payer: MEDICARE

## 2024-05-08 DIAGNOSIS — I73.9 PVD (PERIPHERAL VASCULAR DISEASE) (HCC): ICD-10-CM

## 2024-05-08 LAB
VAS LEFT ABI: 1.15
VAS LEFT ARM BP: 136 MMHG
VAS LEFT CALF PRESSURE: 179 MMHG
VAS LEFT DORSALIS PEDIS BP: 153 MMHG
VAS LEFT HIGH THIGH PRESSURE: 160 MMHG
VAS LEFT LOW THIGH PRESSURE: 159 MMHG
VAS LEFT PTA BP: 164 MMHG
VAS LEFT TBI: 0.64
VAS LEFT TOE PRESSURE: 92 MMHG
VAS RIGHT ABI: 1.17
VAS RIGHT ARM BP: 143 MMHG
VAS RIGHT CALF PRESSURE: 166 MMHG
VAS RIGHT DORSALIS PEDIS BP: 157 MMHG
VAS RIGHT HIGH THIGH PRESSURE: 169 MMHG
VAS RIGHT LOW THIGH PRESSURE: 179 MMHG
VAS RIGHT PTA BP: 168 MMHG
VAS RIGHT TBI: 0.83
VAS RIGHT TOE PRESSURE: 119 MMHG

## 2024-05-08 PROCEDURE — 93923 UPR/LXTR ART STDY 3+ LVLS: CPT

## 2024-05-08 PROCEDURE — 93923 UPR/LXTR ART STDY 3+ LVLS: CPT | Performed by: SURGERY

## 2024-05-09 ENCOUNTER — TELEPHONE (OUTPATIENT)
Dept: VASCULAR SURGERY | Age: 74
End: 2024-05-09

## 2024-05-09 DIAGNOSIS — I73.9 PVD (PERIPHERAL VASCULAR DISEASE) (HCC): Primary | ICD-10-CM

## 2024-05-09 NOTE — TELEPHONE ENCOUNTER
Called and left a message for the patient to let her know the following.  I will place the orders for MAEGAN and mail the patient an appointment card.         ----- Message from MICHELLE Jacob sent at 5/9/2024 10:28 AM CDT -----  Please let her know this looks good.  Needs 1 year with maegan. Please place order

## 2024-05-15 ENCOUNTER — PROCEDURE VISIT (OUTPATIENT)
Dept: OBSTETRICS AND GYNECOLOGY | Age: 74
End: 2024-05-15
Payer: MEDICARE

## 2024-05-15 VITALS
DIASTOLIC BLOOD PRESSURE: 82 MMHG | HEIGHT: 60 IN | WEIGHT: 151 LBS | SYSTOLIC BLOOD PRESSURE: 142 MMHG | BODY MASS INDEX: 29.64 KG/M2

## 2024-05-15 DIAGNOSIS — N32.81 OAB (OVERACTIVE BLADDER): Primary | ICD-10-CM

## 2024-05-15 NOTE — PROGRESS NOTES
"Subjective   Chief Complaint   Patient presents with    interstim PNE trial      Pt here today for interstim PNE trial for OAB     Eliza Corbin is a 74 y.o. year old .  No LMP recorded. Patient is postmenopausal.  She presents to be seen for placement of a temporary InterStim device for the indication of overactive bladder.  The patient responded well to oxybutynin and Myrbetriq, but could not tolerate the side effects of either.  Eliza has no questions about her scheduled procedure.     The following portions of the patient's history were reviewed and updated as appropriate:current medications and allergies    Social History    Tobacco Use      Smoking status: Never      Smokeless tobacco: Never    Review of Systems   Constitutional:  Negative for activity change and unexpected weight change.   HENT:          Significant dry mouth and dry eyes with oxybutynin   Genitourinary:  Positive for enuresis (in [place] all the time.  Has accidents on a daily basis), frequency and urgency.        OAB symptoms improved with oxybutynin         Objective   /82   Ht 152.4 cm (60\")   Wt 68.5 kg (151 lb)   BMI 29.49 kg/m²     Physical Exam  Vitals and nursing note reviewed.   Constitutional:       General: She is not in acute distress.     Appearance: She is well-developed.   HENT:      Head: Normocephalic and atraumatic.   Neck:      Thyroid: No thyromegaly.   Pulmonary:      Effort: Pulmonary effort is normal.   Genitourinary:     Comments: Patient prepped and draped in the prone position.  Anatomical landmarks were used to identify the S3 foramen.  The area was first injected with lidocaine and then lead wires were placed in the S3 foramen, bilaterally.  The patient had appropriate sensation when stimulation was applied to both sides.  The patient tolerated the procedure well  Musculoskeletal:         General: Normal range of motion.      Cervical back: Normal range of motion.   Skin:     General: Skin is " warm and dry.   Neurological:      Mental Status: She is alert and oriented to person, place, and time.   Psychiatric:         Mood and Affect: Mood normal.         Behavior: Behavior normal.         Thought Content: Thought content normal.         Judgment: Judgment normal.         Lab Review   No data reviewed    Imaging   No data reviewed     Assessment & Plan    Diagnoses and all orders for this visit:    1. OAB (overactive bladder) (Primary): Patient with significant incontinence secondary to overactive bladder.  She had symptomatic improvement with both oxybutynin and Myrbetriq, but could not tolerate the side effects.  A temporary InterStim device was placed today and will be removed on Monday.  The patient tolerated placement well and all of her questions were answered.      This note was electronically signed.    Shivani Dominguez MD  May 15, 2024  15:26 CDT

## 2024-05-20 ENCOUNTER — CLINICAL SUPPORT (OUTPATIENT)
Dept: OBSTETRICS AND GYNECOLOGY | Age: 74
End: 2024-05-20
Payer: MEDICARE

## 2024-05-21 ENCOUNTER — TELEPHONE (OUTPATIENT)
Dept: OBSTETRICS AND GYNECOLOGY | Age: 74
End: 2024-05-21
Payer: MEDICARE

## 2024-05-21 DIAGNOSIS — N32.81 OAB (OVERACTIVE BLADDER): Primary | ICD-10-CM

## 2024-05-21 RX ORDER — SODIUM CHLORIDE 0.9 % (FLUSH) 0.9 %
10 SYRINGE (ML) INJECTION AS NEEDED
OUTPATIENT
Start: 2024-05-21

## 2024-05-21 RX ORDER — SODIUM CHLORIDE 9 MG/ML
40 INJECTION, SOLUTION INTRAVENOUS AS NEEDED
OUTPATIENT
Start: 2024-05-21

## 2024-05-21 RX ORDER — ACETAMINOPHEN 500 MG
1000 TABLET ORAL ONCE
OUTPATIENT
Start: 2024-05-21 | End: 2024-05-21

## 2024-05-21 RX ORDER — SODIUM CHLORIDE 0.9 % (FLUSH) 0.9 %
10 SYRINGE (ML) INJECTION EVERY 12 HOURS SCHEDULED
OUTPATIENT
Start: 2024-05-21

## 2024-05-21 RX ORDER — GABAPENTIN 100 MG/1
600 CAPSULE ORAL ONCE
OUTPATIENT
Start: 2024-05-21 | End: 2024-05-21

## 2024-05-21 NOTE — TELEPHONE ENCOUNTER
Pt notified of surgery with sanaz on 6-24-24. Pt to arrive at 8:00am for a 10:00am surgery. Pt also scheduled for pre op labs on 6-10-24 @ 9:45am. Pt also scheduled for a postop visit on 7-9-24 @ 10:30am pt understood all.

## 2024-05-22 ENCOUNTER — TELEPHONE (OUTPATIENT)
Dept: NEUROSURGERY | Age: 74
End: 2024-05-22

## 2024-05-22 DIAGNOSIS — M54.50 LOW BACK PAIN, UNSPECIFIED BACK PAIN LATERALITY, UNSPECIFIED CHRONICITY, UNSPECIFIED WHETHER SCIATICA PRESENT: Primary | ICD-10-CM

## 2024-05-22 NOTE — TELEPHONE ENCOUNTER
[facility] New Patient Questionnaire    Diagnosis/Reason for Referral?    DX: M54.16 (ICD-10-CM) - Radiculopathy, lumbar region     2. Who is completing questionnaire?      Patient X Caregiver Family      3. Has the patient had any previous spinal/brain surgeries?     YES       A. If yes, what is the name of the facility in which the surgery was performed?     [facility]      B. Procedure/Surgery performed?    \"CORPECTOMY?\"     C. Who was the surgeon?         D. When was the surgery?    2017       E. Did the patient improve after the surgery?        4. Is this a second opinion?   If yes, Dr. Hamilton would like to review patient first before making the appointment.      5. Have MRI Images been obtain within the last year?     Yes  No X (NEEDS XR)      XR  CT     If yes, where was the imaging performed?     If yes, what part of the body?      Lumbar  Cervical  Thoracic  Brain     If yes, when was it obtained?      MM/YY    Note: if the scan was performed at a facility other than [facility], the disc will need to be brought to the appointment or we need to reach out to obtain the disc.     A. Was the patient instructed to provide the disc?      Yes   No X      8. Has the patient had a NCV/EMG within the last year?      Yes  No X     If yes, where was it performed and date?      MM/YY  Location:      9. Has the patient been to Physical Therapy?      Yes  No X     If yes, what location, how long attended, and last visit?    Location:        Therapy Lasted:    Date of Last Visit:      10. Has the patient been to Pain Management?     Yes  No X     If yes, what location and last visit     Location:   Last Visit:   Is it helping?   
show

## 2024-06-10 ENCOUNTER — PRE-ADMISSION TESTING (OUTPATIENT)
Dept: PREADMISSION TESTING | Facility: HOSPITAL | Age: 74
End: 2024-06-10
Payer: MEDICARE

## 2024-06-10 VITALS
HEART RATE: 76 BPM | BODY MASS INDEX: 29.99 KG/M2 | RESPIRATION RATE: 18 BRPM | WEIGHT: 152.78 LBS | HEIGHT: 60 IN | OXYGEN SATURATION: 95 % | SYSTOLIC BLOOD PRESSURE: 136 MMHG | DIASTOLIC BLOOD PRESSURE: 69 MMHG

## 2024-06-10 DIAGNOSIS — N32.81 OAB (OVERACTIVE BLADDER): ICD-10-CM

## 2024-06-10 LAB
ANION GAP SERPL CALCULATED.3IONS-SCNC: 8 MMOL/L (ref 5–15)
BUN SERPL-MCNC: 24 MG/DL (ref 8–23)
BUN/CREAT SERPL: 31.6 (ref 7–25)
CALCIUM SPEC-SCNC: 9.1 MG/DL (ref 8.6–10.5)
CHLORIDE SERPL-SCNC: 99 MMOL/L (ref 98–107)
CO2 SERPL-SCNC: 31 MMOL/L (ref 22–29)
CREAT SERPL-MCNC: 0.76 MG/DL (ref 0.57–1)
EGFRCR SERPLBLD CKD-EPI 2021: 83.4 ML/MIN/1.73
GLUCOSE SERPL-MCNC: 86 MG/DL (ref 65–99)
POTASSIUM SERPL-SCNC: 3.9 MMOL/L (ref 3.5–5.2)
SODIUM SERPL-SCNC: 138 MMOL/L (ref 136–145)

## 2024-06-10 PROCEDURE — 85025 COMPLETE CBC W/AUTO DIFF WBC: CPT | Performed by: OBSTETRICS & GYNECOLOGY

## 2024-06-10 PROCEDURE — 93005 ELECTROCARDIOGRAM TRACING: CPT

## 2024-06-10 PROCEDURE — 36415 COLL VENOUS BLD VENIPUNCTURE: CPT

## 2024-06-10 PROCEDURE — 80048 BASIC METABOLIC PNL TOTAL CA: CPT

## 2024-06-10 RX ORDER — OXYBUTYNIN CHLORIDE 5 MG/1
5 TABLET, EXTENDED RELEASE ORAL DAILY
COMMUNITY

## 2024-06-10 NOTE — DISCHARGE INSTRUCTIONS

## 2024-06-12 DIAGNOSIS — R94.31 ABNORMAL EKG: Primary | ICD-10-CM

## 2024-06-12 LAB
QT INTERVAL: 408 MS
QTC INTERVAL: 427 MS

## 2024-06-19 ENCOUNTER — OFFICE VISIT (OUTPATIENT)
Dept: NEUROSURGERY | Age: 74
End: 2024-06-19
Payer: MEDICARE

## 2024-06-19 ENCOUNTER — HOSPITAL ENCOUNTER (OUTPATIENT)
Dept: GENERAL RADIOLOGY | Age: 74
Discharge: HOME | End: 2024-06-19
Payer: MEDICARE

## 2024-06-19 VITALS
HEIGHT: 61 IN | DIASTOLIC BLOOD PRESSURE: 70 MMHG | RESPIRATION RATE: 18 BRPM | OXYGEN SATURATION: 90 % | HEART RATE: 71 BPM | BODY MASS INDEX: 29.07 KG/M2 | SYSTOLIC BLOOD PRESSURE: 110 MMHG | WEIGHT: 154 LBS

## 2024-06-19 DIAGNOSIS — M41.50 DEGENERATIVE SCOLIOSIS: Primary | ICD-10-CM

## 2024-06-19 DIAGNOSIS — M25.552 LEFT HIP PAIN: ICD-10-CM

## 2024-06-19 DIAGNOSIS — G89.29 CHRONIC MIDLINE LOW BACK PAIN WITH LEFT-SIDED SCIATICA: ICD-10-CM

## 2024-06-19 DIAGNOSIS — M54.42 CHRONIC MIDLINE LOW BACK PAIN WITH LEFT-SIDED SCIATICA: ICD-10-CM

## 2024-06-19 DIAGNOSIS — M51.36 DDD (DEGENERATIVE DISC DISEASE), LUMBAR: ICD-10-CM

## 2024-06-19 DIAGNOSIS — M43.16 SPONDYLOLISTHESIS AT L4-L5 LEVEL: ICD-10-CM

## 2024-06-19 DIAGNOSIS — M54.50 LOW BACK PAIN, UNSPECIFIED BACK PAIN LATERALITY, UNSPECIFIED CHRONICITY, UNSPECIFIED WHETHER SCIATICA PRESENT: ICD-10-CM

## 2024-06-19 PROCEDURE — 3074F SYST BP LT 130 MM HG: CPT | Performed by: NURSE PRACTITIONER

## 2024-06-19 PROCEDURE — 72110 X-RAY EXAM L-2 SPINE 4/>VWS: CPT

## 2024-06-19 PROCEDURE — 99204 OFFICE O/P NEW MOD 45 MIN: CPT | Performed by: NURSE PRACTITIONER

## 2024-06-19 PROCEDURE — 3078F DIAST BP <80 MM HG: CPT | Performed by: NURSE PRACTITIONER

## 2024-06-19 PROCEDURE — 1123F ACP DISCUSS/DSCN MKR DOCD: CPT | Performed by: NURSE PRACTITIONER

## 2024-06-19 RX ORDER — TIZANIDINE 4 MG/1
4 TABLET ORAL 3 TIMES DAILY PRN
Qty: 90 TABLET | Refills: 0 | Status: SHIPPED | OUTPATIENT
Start: 2024-06-19 | End: 2024-07-19

## 2024-06-19 RX ORDER — CYCLOBENZAPRINE HCL 5 MG
5 TABLET ORAL 3 TIMES DAILY PRN
COMMUNITY

## 2024-06-19 RX ORDER — OXYBUTYNIN CHLORIDE 5 MG/1
5 TABLET, EXTENDED RELEASE ORAL DAILY
COMMUNITY
Start: 2024-04-01

## 2024-06-19 RX ORDER — CYANOCOBALAMIN 1000 UG/ML
1000 INJECTION, SOLUTION INTRAMUSCULAR; SUBCUTANEOUS
COMMUNITY
Start: 2024-03-29

## 2024-06-19 RX ORDER — HYDROCODONE BITARTRATE AND ACETAMINOPHEN 10; 325 MG/1; MG/1
TABLET ORAL
COMMUNITY
Start: 2024-05-28

## 2024-06-19 ASSESSMENT — ENCOUNTER SYMPTOMS
RESPIRATORY NEGATIVE: 1
BACK PAIN: 1
GASTROINTESTINAL NEGATIVE: 1
EYES NEGATIVE: 1

## 2024-06-19 NOTE — PROGRESS NOTES
Review of Systems   Constitutional: Negative.    HENT: Negative.     Eyes: Negative.    Respiratory: Negative.     Cardiovascular: Negative.    Gastrointestinal: Negative.    Genitourinary: Negative.    Musculoskeletal:  Positive for back pain, joint pain and myalgias.   Skin: Negative.    Neurological:  Positive for tingling and weakness.   Endo/Heme/Allergies: Negative.    Psychiatric/Behavioral: Negative.        
Alert and oriented x 4  Normal speech pattern, following commands    Motor:  RIGHT:       iliopsoas 5/5    hamstring 5/5    quadriceps 5/5    EHL 5/5   Tibialis anterior 5/5    Gastrocnemius 5/5    LEFT:      iliopsoas 5/5    hamstring 5/5    quadriceps 5/5    EHL 5/5   Tibialis anterior 5/5    Gastrocnemius 5/5    Decreased pinprick sensation to left lateral leg  Reflexes are 2+ and symmetric  No myofacial tenderness to palpation  Antalgic Gait pattern      DATA and IMAGING:    Nursing/pcp notes, imaging, labs, and vitals reviewed.     PT,OT and/or speech notes reviewed    Lab Results   Component Value Date    WBC 15.4 (H) 04/26/2023    HGB 8.5 (L) 04/26/2023    HCT 27.5 (L) 04/26/2023    MCV 96.5 04/26/2023     04/26/2023     Lab Results   Component Value Date     04/26/2023    K 3.6 04/26/2023     04/26/2023    CO2 26 04/26/2023    BUN 14 04/26/2023    CREATININE 0.9 04/26/2023    GLUCOSE 316 (H) 04/26/2023    CALCIUM 8.3 (L) 04/26/2023    BILITOT 0.6 04/24/2023    ALKPHOS 135 (H) 04/24/2023    AST 14 04/24/2023    ALT 7 04/24/2023    LABGLOM >60 04/26/2023    GFRAA >59 08/06/2021    GLOB 2.7 09/07/2016     Lab Results   Component Value Date    INR 1.10 04/26/2023    INR 0.93 08/06/2021    INR 0.83 (L) 03/15/2020    PROTIME 14.2 04/26/2023    PROTIME 12.7 08/06/2021    PROTIME 11.3 (L) 03/15/2020     X-ray lumbar spine 6/19/2024 [facility]  I have personally reviewed these images and my interpretation is:  Severe DDD throughout the entire thoracolumbar spine  There is an S-curve scoliotic curvature, dextroscoliotic curvature with the apex at L4-5 and levoscoliotic curvature just above  There is a spondylolisthesis at L4 on L5 that measures 8 mm in neutral, 6 to 7 mm in extension, 10 mm in flexion indicative of some spinal instability.        ASSESSMENT:    Alisson Valderrama is a 74 y.o. female with a history of multiple left hip surgeries with complaints of low back and left hip, groin, and lateral

## 2024-06-21 ENCOUNTER — HOSPITAL ENCOUNTER (OUTPATIENT)
Dept: CARDIOLOGY | Facility: HOSPITAL | Age: 74
Discharge: HOME | End: 2024-06-21
Payer: MEDICARE

## 2024-06-21 VITALS
DIASTOLIC BLOOD PRESSURE: 62 MMHG | SYSTOLIC BLOOD PRESSURE: 116 MMHG | WEIGHT: 152 LBS | HEIGHT: 61 IN | HEART RATE: 62 BPM | BODY MASS INDEX: 28.7 KG/M2

## 2024-06-21 DIAGNOSIS — R94.31 ABNORMAL EKG: ICD-10-CM

## 2024-06-21 PROCEDURE — 93017 CV STRESS TEST TRACING ONLY: CPT

## 2024-06-21 RX ORDER — TIZANIDINE 4 MG/1
4 TABLET ORAL EVERY 8 HOURS PRN
COMMUNITY

## 2024-06-24 ENCOUNTER — ANESTHESIA EVENT (OUTPATIENT)
Dept: PERIOP | Facility: HOSPITAL | Age: 74
End: 2024-06-24
Payer: MEDICARE

## 2024-06-24 ENCOUNTER — HOSPITAL ENCOUNTER (OUTPATIENT)
Facility: HOSPITAL | Age: 74
Setting detail: HOSPITAL OUTPATIENT SURGERY
Discharge: HOME | End: 2024-06-24
Attending: OBSTETRICS & GYNECOLOGY | Admitting: OBSTETRICS & GYNECOLOGY
Payer: MEDICARE

## 2024-06-24 ENCOUNTER — APPOINTMENT (OUTPATIENT)
Dept: GENERAL RADIOLOGY | Facility: HOSPITAL | Age: 74
End: 2024-06-24
Payer: MEDICARE

## 2024-06-24 ENCOUNTER — ANESTHESIA (OUTPATIENT)
Dept: PERIOP | Facility: HOSPITAL | Age: 74
End: 2024-06-24
Payer: MEDICARE

## 2024-06-24 VITALS
SYSTOLIC BLOOD PRESSURE: 142 MMHG | OXYGEN SATURATION: 94 % | TEMPERATURE: 97.6 F | DIASTOLIC BLOOD PRESSURE: 77 MMHG | RESPIRATION RATE: 16 BRPM | HEART RATE: 88 BPM

## 2024-06-24 DIAGNOSIS — Z09 S/P GYNECOLOGICAL SURGERY, FOLLOW-UP EXAM: Primary | ICD-10-CM

## 2024-06-24 DIAGNOSIS — N32.81 OAB (OVERACTIVE BLADDER): ICD-10-CM

## 2024-06-24 LAB
ABO GROUP BLD: NORMAL
BH CV STRESS BP STAGE 1: NORMAL
BH CV STRESS DURATION MIN STAGE 1: 1
BH CV STRESS DURATION SEC STAGE 1: 37
BH CV STRESS GRADE STAGE 1: 10
BH CV STRESS HR STAGE 1: 125
BH CV STRESS METS STAGE 1: 5
BH CV STRESS PROTOCOL 1: NORMAL
BH CV STRESS RECOVERY BP: NORMAL MMHG
BH CV STRESS RECOVERY HR: 71 BPM
BH CV STRESS SPEED STAGE 1: 1.7
BH CV STRESS STAGE 1: 1
BLD GP AB SCN SERPL QL: NEGATIVE
MAXIMAL PREDICTED HEART RATE: 148 BPM
PERCENT MAX PREDICTED HR: 84.46 %
RH BLD: POSITIVE
STRESS BASELINE BP: NORMAL MMHG
STRESS BASELINE HR: 63 BPM
STRESS PERCENT HR: 99 %
STRESS POST ESTIMATED WORKLOAD: 5 METS
STRESS POST EXERCISE DUR MIN: 1 MIN
STRESS POST EXERCISE DUR SEC: 37 SEC
STRESS POST PEAK BP: NORMAL MMHG
STRESS POST PEAK HR: 125 BPM
STRESS TARGET HR: 126 BPM
T&S EXPIRATION DATE: NORMAL

## 2024-06-24 PROCEDURE — 72220 X-RAY EXAM SACRUM TAILBONE: CPT

## 2024-06-24 PROCEDURE — 64590 INS/RPL PRPH SAC/GSTR NPG/R: CPT | Performed by: OBSTETRICS & GYNECOLOGY

## 2024-06-24 PROCEDURE — S0260 H&P FOR SURGERY: HCPCS | Performed by: OBSTETRICS & GYNECOLOGY

## 2024-06-24 PROCEDURE — 25010000002 CEFAZOLIN PER 500 MG: Performed by: OBSTETRICS & GYNECOLOGY

## 2024-06-24 PROCEDURE — C1889 IMPLANT/INSERT DEVICE, NOC: HCPCS | Performed by: OBSTETRICS & GYNECOLOGY

## 2024-06-24 PROCEDURE — C1778 LEAD, NEUROSTIMULATOR: HCPCS | Performed by: OBSTETRICS & GYNECOLOGY

## 2024-06-24 PROCEDURE — 25810000003 LACTATED RINGERS PER 1000 ML: Performed by: OBSTETRICS & GYNECOLOGY

## 2024-06-24 PROCEDURE — 86901 BLOOD TYPING SEROLOGIC RH(D): CPT | Performed by: OBSTETRICS & GYNECOLOGY

## 2024-06-24 PROCEDURE — 25010000002 ONDANSETRON PER 1 MG

## 2024-06-24 PROCEDURE — 86850 RBC ANTIBODY SCREEN: CPT | Performed by: OBSTETRICS & GYNECOLOGY

## 2024-06-24 PROCEDURE — C1767 GENERATOR, NEURO NON-RECHARG: HCPCS | Performed by: OBSTETRICS & GYNECOLOGY

## 2024-06-24 PROCEDURE — 25010000002 DROPERIDOL PER 5 MG: Performed by: ANESTHESIOLOGY

## 2024-06-24 PROCEDURE — 86900 BLOOD TYPING SEROLOGIC ABO: CPT | Performed by: OBSTETRICS & GYNECOLOGY

## 2024-06-24 PROCEDURE — 64581 OPN IMPLTJ NEA SACRAL NERVE: CPT | Performed by: OBSTETRICS & GYNECOLOGY

## 2024-06-24 PROCEDURE — 25010000002 LIDOCAINE 1 % SOLUTION: Performed by: OBSTETRICS & GYNECOLOGY

## 2024-06-24 PROCEDURE — 76000 FLUOROSCOPY <1 HR PHYS/QHP: CPT

## 2024-06-24 PROCEDURE — C1787 PATIENT PROGR, NEUROSTIM: HCPCS | Performed by: OBSTETRICS & GYNECOLOGY

## 2024-06-24 PROCEDURE — 76000 FLUOROSCOPY <1 HR PHYS/QHP: CPT | Performed by: OBSTETRICS & GYNECOLOGY

## 2024-06-24 PROCEDURE — 25010000002 DEXAMETHASONE PER 1 MG

## 2024-06-24 PROCEDURE — 25010000002 VASOPRESSIN 20 UNIT/ML SOLUTION

## 2024-06-24 PROCEDURE — 25010000002 PROPOFOL 10 MG/ML EMULSION

## 2024-06-24 PROCEDURE — 25010000002 FENTANYL CITRATE (PF) 100 MCG/2ML SOLUTION

## 2024-06-24 DEVICE — KT LD NEUROSTM INTERSTIM SURESCAN FULLBDY 4.32MM: Type: IMPLANTABLE DEVICE | Site: BUTTOCKS | Status: FUNCTIONAL

## 2024-06-24 DEVICE — ENV ANTIBAC TYRX NEURO ABS MD: Type: IMPLANTABLE DEVICE | Site: BUTTOCKS | Status: FUNCTIONAL

## 2024-06-24 DEVICE — NEUROSTM SACRAL/NRV INTERSTIMX NONRECHG: Type: IMPLANTABLE DEVICE | Site: BUTTOCKS | Status: FUNCTIONAL

## 2024-06-24 RX ORDER — LIDOCAINE HYDROCHLORIDE 10 MG/ML
INJECTION, SOLUTION INFILTRATION; PERINEURAL AS NEEDED
Status: DISCONTINUED | OUTPATIENT
Start: 2024-06-24 | End: 2024-06-24 | Stop reason: HOSPADM

## 2024-06-24 RX ORDER — SODIUM CHLORIDE 0.9 % (FLUSH) 0.9 %
3-10 SYRINGE (ML) INJECTION AS NEEDED
Status: DISCONTINUED | OUTPATIENT
Start: 2024-06-24 | End: 2024-06-24 | Stop reason: HOSPADM

## 2024-06-24 RX ORDER — SUCCINYLCHOLINE/SOD CL,ISO/PF 200MG/10ML
SYRINGE (ML) INTRAVENOUS AS NEEDED
Status: DISCONTINUED | OUTPATIENT
Start: 2024-06-24 | End: 2024-06-24 | Stop reason: SURG

## 2024-06-24 RX ORDER — FENTANYL CITRATE 50 UG/ML
INJECTION, SOLUTION INTRAMUSCULAR; INTRAVENOUS AS NEEDED
Status: DISCONTINUED | OUTPATIENT
Start: 2024-06-24 | End: 2024-06-24 | Stop reason: SURG

## 2024-06-24 RX ORDER — PROPOFOL 10 MG/ML
VIAL (ML) INTRAVENOUS AS NEEDED
Status: DISCONTINUED | OUTPATIENT
Start: 2024-06-24 | End: 2024-06-24 | Stop reason: SURG

## 2024-06-24 RX ORDER — GABAPENTIN 300 MG/1
600 CAPSULE ORAL ONCE
Status: COMPLETED | OUTPATIENT
Start: 2024-06-24 | End: 2024-06-24

## 2024-06-24 RX ORDER — SODIUM CHLORIDE, SODIUM LACTATE, POTASSIUM CHLORIDE, CALCIUM CHLORIDE 600; 310; 30; 20 MG/100ML; MG/100ML; MG/100ML; MG/100ML
100 INJECTION, SOLUTION INTRAVENOUS CONTINUOUS
Status: DISCONTINUED | OUTPATIENT
Start: 2024-06-24 | End: 2024-06-24 | Stop reason: HOSPADM

## 2024-06-24 RX ORDER — BUPIVACAINE HCL/0.9 % NACL/PF 0.125 %
PLASTIC BAG, INJECTION (ML) EPIDURAL AS NEEDED
Status: DISCONTINUED | OUTPATIENT
Start: 2024-06-24 | End: 2024-06-24 | Stop reason: SURG

## 2024-06-24 RX ORDER — HYDROCODONE BITARTRATE AND ACETAMINOPHEN 10; 325 MG/1; MG/1
1 TABLET ORAL EVERY 4 HOURS PRN
Status: DISCONTINUED | OUTPATIENT
Start: 2024-06-24 | End: 2024-06-24 | Stop reason: HOSPADM

## 2024-06-24 RX ORDER — ONDANSETRON 2 MG/ML
4 INJECTION INTRAMUSCULAR; INTRAVENOUS ONCE AS NEEDED
Status: DISCONTINUED | OUTPATIENT
Start: 2024-06-24 | End: 2024-06-24 | Stop reason: HOSPADM

## 2024-06-24 RX ORDER — DROPERIDOL 2.5 MG/ML
0.62 INJECTION, SOLUTION INTRAMUSCULAR; INTRAVENOUS ONCE AS NEEDED
Status: DISCONTINUED | OUTPATIENT
Start: 2024-06-24 | End: 2024-06-24 | Stop reason: HOSPADM

## 2024-06-24 RX ORDER — HYDROCODONE BITARTRATE AND ACETAMINOPHEN 5; 325 MG/1; MG/1
1 TABLET ORAL EVERY 4 HOURS PRN
Status: DISCONTINUED | OUTPATIENT
Start: 2024-06-24 | End: 2024-06-24 | Stop reason: HOSPADM

## 2024-06-24 RX ORDER — DROPERIDOL 2.5 MG/ML
0.62 INJECTION, SOLUTION INTRAMUSCULAR; INTRAVENOUS ONCE AS NEEDED
Status: COMPLETED | OUTPATIENT
Start: 2024-06-24 | End: 2024-06-24

## 2024-06-24 RX ORDER — LIDOCAINE HYDROCHLORIDE 10 MG/ML
0.5 INJECTION, SOLUTION EPIDURAL; INFILTRATION; INTRACAUDAL; PERINEURAL ONCE AS NEEDED
Status: DISCONTINUED | OUTPATIENT
Start: 2024-06-24 | End: 2024-06-24 | Stop reason: HOSPADM

## 2024-06-24 RX ORDER — LIDOCAINE HYDROCHLORIDE 20 MG/ML
INJECTION, SOLUTION EPIDURAL; INFILTRATION; INTRACAUDAL; PERINEURAL AS NEEDED
Status: DISCONTINUED | OUTPATIENT
Start: 2024-06-24 | End: 2024-06-24 | Stop reason: SURG

## 2024-06-24 RX ORDER — SODIUM CHLORIDE 0.9 % (FLUSH) 0.9 %
10 SYRINGE (ML) INJECTION AS NEEDED
Status: DISCONTINUED | OUTPATIENT
Start: 2024-06-24 | End: 2024-06-24 | Stop reason: HOSPADM

## 2024-06-24 RX ORDER — SODIUM CHLORIDE 0.9 % (FLUSH) 0.9 %
3 SYRINGE (ML) INJECTION EVERY 12 HOURS SCHEDULED
Status: DISCONTINUED | OUTPATIENT
Start: 2024-06-24 | End: 2024-06-24 | Stop reason: HOSPADM

## 2024-06-24 RX ORDER — SODIUM CHLORIDE 0.9 % (FLUSH) 0.9 %
10 SYRINGE (ML) INJECTION EVERY 12 HOURS SCHEDULED
Status: DISCONTINUED | OUTPATIENT
Start: 2024-06-24 | End: 2024-06-24 | Stop reason: HOSPADM

## 2024-06-24 RX ORDER — SODIUM CHLORIDE, SODIUM LACTATE, POTASSIUM CHLORIDE, CALCIUM CHLORIDE 600; 310; 30; 20 MG/100ML; MG/100ML; MG/100ML; MG/100ML
1000 INJECTION, SOLUTION INTRAVENOUS CONTINUOUS
Status: DISCONTINUED | OUTPATIENT
Start: 2024-06-24 | End: 2024-06-24 | Stop reason: HOSPADM

## 2024-06-24 RX ORDER — SODIUM CHLORIDE 9 MG/ML
40 INJECTION, SOLUTION INTRAVENOUS AS NEEDED
Status: DISCONTINUED | OUTPATIENT
Start: 2024-06-24 | End: 2024-06-24 | Stop reason: HOSPADM

## 2024-06-24 RX ORDER — ONDANSETRON 2 MG/ML
INJECTION INTRAMUSCULAR; INTRAVENOUS AS NEEDED
Status: DISCONTINUED | OUTPATIENT
Start: 2024-06-24 | End: 2024-06-24 | Stop reason: SURG

## 2024-06-24 RX ORDER — ROCURONIUM BROMIDE 10 MG/ML
INJECTION, SOLUTION INTRAVENOUS AS NEEDED
Status: DISCONTINUED | OUTPATIENT
Start: 2024-06-24 | End: 2024-06-24 | Stop reason: SURG

## 2024-06-24 RX ORDER — MIDAZOLAM HYDROCHLORIDE 1 MG/ML
0.5 INJECTION INTRAMUSCULAR; INTRAVENOUS
Status: DISCONTINUED | OUTPATIENT
Start: 2024-06-24 | End: 2024-06-24 | Stop reason: HOSPADM

## 2024-06-24 RX ORDER — LABETALOL HYDROCHLORIDE 5 MG/ML
5 INJECTION, SOLUTION INTRAVENOUS
Status: DISCONTINUED | OUTPATIENT
Start: 2024-06-24 | End: 2024-06-24 | Stop reason: HOSPADM

## 2024-06-24 RX ORDER — SODIUM CHLORIDE 0.9 % (FLUSH) 0.9 %
3 SYRINGE (ML) INJECTION AS NEEDED
Status: DISCONTINUED | OUTPATIENT
Start: 2024-06-24 | End: 2024-06-24 | Stop reason: HOSPADM

## 2024-06-24 RX ORDER — DEXAMETHASONE SODIUM PHOSPHATE 4 MG/ML
INJECTION, SOLUTION INTRA-ARTICULAR; INTRALESIONAL; INTRAMUSCULAR; INTRAVENOUS; SOFT TISSUE AS NEEDED
Status: DISCONTINUED | OUTPATIENT
Start: 2024-06-24 | End: 2024-06-24 | Stop reason: SURG

## 2024-06-24 RX ORDER — EPHEDRINE SULFATE 50 MG/ML
INJECTION, SOLUTION INTRAVENOUS AS NEEDED
Status: DISCONTINUED | OUTPATIENT
Start: 2024-06-24 | End: 2024-06-24 | Stop reason: SURG

## 2024-06-24 RX ORDER — FENTANYL CITRATE 50 UG/ML
50 INJECTION, SOLUTION INTRAMUSCULAR; INTRAVENOUS
Status: DISCONTINUED | OUTPATIENT
Start: 2024-06-24 | End: 2024-06-24 | Stop reason: HOSPADM

## 2024-06-24 RX ORDER — NALOXONE HYDROCHLORIDE 4 MG/.1ML
SPRAY NASAL
Qty: 2 EACH | Refills: 0 | Status: SHIPPED | OUTPATIENT
Start: 2024-06-24

## 2024-06-24 RX ORDER — FLUMAZENIL 0.1 MG/ML
0.2 INJECTION INTRAVENOUS AS NEEDED
Status: DISCONTINUED | OUTPATIENT
Start: 2024-06-24 | End: 2024-06-24 | Stop reason: HOSPADM

## 2024-06-24 RX ORDER — NALOXONE HCL 0.4 MG/ML
0.4 VIAL (ML) INJECTION AS NEEDED
Status: DISCONTINUED | OUTPATIENT
Start: 2024-06-24 | End: 2024-06-24 | Stop reason: HOSPADM

## 2024-06-24 RX ORDER — ACETAMINOPHEN 500 MG
1000 TABLET ORAL ONCE
Status: COMPLETED | OUTPATIENT
Start: 2024-06-24 | End: 2024-06-24

## 2024-06-24 RX ORDER — TRAMADOL HYDROCHLORIDE 50 MG/1
50 TABLET ORAL EVERY 6 HOURS PRN
Qty: 8 TABLET | Refills: 0 | Status: SHIPPED | OUTPATIENT
Start: 2024-06-24 | End: 2025-06-24

## 2024-06-24 RX ADMIN — ROCURONIUM 5 MG: 50 INJECTION, SOLUTION INTRAVENOUS at 12:02

## 2024-06-24 RX ADMIN — LIDOCAINE HYDROCHLORIDE 100 MG: 20 INJECTION, SOLUTION EPIDURAL; INFILTRATION; INTRACAUDAL; PERINEURAL at 12:02

## 2024-06-24 RX ADMIN — Medication 150 MCG: at 12:06

## 2024-06-24 RX ADMIN — SODIUM CHLORIDE, POTASSIUM CHLORIDE, SODIUM LACTATE AND CALCIUM CHLORIDE 1000 ML: 600; 310; 30; 20 INJECTION, SOLUTION INTRAVENOUS at 09:10

## 2024-06-24 RX ADMIN — HYDROCODONE BITARTRATE AND ACETAMINOPHEN 1 TABLET: 5; 325 TABLET ORAL at 13:41

## 2024-06-24 RX ADMIN — FENTANYL CITRATE 100 MCG: 50 INJECTION, SOLUTION INTRAMUSCULAR; INTRAVENOUS at 12:02

## 2024-06-24 RX ADMIN — DEXAMETHASONE SODIUM PHOSPHATE 4 MG: 4 INJECTION INTRA-ARTICULAR; INTRALESIONAL; INTRAMUSCULAR; INTRAVENOUS; SOFT TISSUE at 12:35

## 2024-06-24 RX ADMIN — ACETAMINOPHEN 1000 MG: 500 TABLET, FILM COATED ORAL at 08:58

## 2024-06-24 RX ADMIN — ONDANSETRON 4 MG: 2 INJECTION INTRAMUSCULAR; INTRAVENOUS at 12:35

## 2024-06-24 RX ADMIN — CEFAZOLIN 2000 MG: 2 INJECTION, POWDER, FOR SOLUTION INTRAMUSCULAR; INTRAVENOUS at 12:10

## 2024-06-24 RX ADMIN — EPHEDRINE SULFATE 10 MG: 50 INJECTION INTRAVENOUS at 12:15

## 2024-06-24 RX ADMIN — GABAPENTIN 600 MG: 300 CAPSULE ORAL at 08:58

## 2024-06-24 RX ADMIN — Medication 140 MG: at 12:03

## 2024-06-24 RX ADMIN — DROPERIDOL 0.62 MG: 2.5 INJECTION, SOLUTION INTRAMUSCULAR; INTRAVENOUS at 13:41

## 2024-06-24 RX ADMIN — PROPOFOL 130 MG: 10 INJECTION, EMULSION INTRAVENOUS at 12:02

## 2024-06-24 RX ADMIN — FENTANYL CITRATE 25 MCG: 50 INJECTION, SOLUTION INTRAMUSCULAR; INTRAVENOUS at 12:37

## 2024-06-24 RX ADMIN — Medication 100 MCG: at 12:15

## 2024-06-24 NOTE — H&P
Shivani Dominguez MD  Physician  Specialty: Obstetrics and Gynecology     Progress Notes     Signed     Encounter Date: 2/1/2024     Signed       Expand All Collapse All       Subjective       Chief Complaint   Patient presents with    Urinary Incontinence       Pt here today as new pt with c/o urinary incontinence. Pt voices no other concerns.          Eliza Corbin is a 73 y.o. year old who presents to be seen for urinary incontinence and frequency.  Patient says she is up frequently during the night, when symptoms are worse; sometimes as many as 20 times while she is trying to sleep.  She is usually in [place] all the time and says she has accidents on a daily basis.     The patient is s/p hysterectomy, a TOTAL ABDOMINAL HYSTERECTOMY for fibroids.  She reports positive urinary incontinence and urinary frequency , but denies pelvic pain, vaginal pressure, bulge outside her body, urinary hesitancy, sensation of incomplete bladder emptying, and stool trapping.  The patient feels like the problem began about one year ago.  She is not currently sexually active.  Eliza Corbin has not had surgery for this problem in the past.     Eliza tried Oxytubynin from her PCP and says that it helped, but it gave her dry mouth and dry eyes.  She also tried Myrbetriq, but had side effects again, including hot flashes.  She drinks water, tea, and coffee - although she admits to fluid restricting to help with leakage.     Medical History        Past Medical History:   Diagnosis Date    Anxiety      Chronic back pain      Family history of colonic polyps      GERD (gastroesophageal reflux disease)      Hiatal hernia      History of colon polyps      HTN (hypertension)      Hypercholesteremia      IBS (irritable bowel syndrome)      Iron deficiency anemia      PONV (postoperative nausea and vomiting)      Stroke 03/2020    Vitamin D deficiency             Current Medications      Current Outpatient Medications:     albuterol  sulfate  (90 Base) MCG/ACT inhaler, Inhale 2 puffs As Needed., Disp: , Rfl:     ALPRAZolam (XANAX) 0.25 MG tablet, Take 1 tablet by mouth 2 (Two) Times a Day As Needed for Anxiety., Disp: , Rfl:     amLODIPine (NORVASC) 5 MG tablet, Take 1 tablet by mouth Daily., Disp: , Rfl:     ARIPiprazole (ABILIFY) 2 MG tablet, Take 1 tablet by mouth Daily., Disp: , Rfl:     cetirizine (zyrTEC) 10 MG tablet, Take 1 tablet by mouth Daily., Disp: , Rfl:     cloNIDine (CATAPRES) 0.1 MG tablet, Take 1 tablet by mouth As Needed., Disp: , Rfl:     Cyanocobalamin 1000 MCG/ML kit, Inject 1 mL under the skin into the appropriate area as directed Every 30 (Thirty) Days., Disp: , Rfl:     cyclobenzaprine (FLEXERIL) 10 MG tablet, , Disp: , Rfl:     DULoxetine (CYMBALTA) 30 MG capsule, Take 3 capsules by mouth Daily., Disp: , Rfl:     fluticasone (Flonase) 50 MCG/ACT nasal spray, 2 sprays into the nostril(s) as directed by provider Daily., Disp: , Rfl:     hydroCHLOROthiazide (HYDRODIURIL) 25 MG tablet, Take 1 tablet by mouth Daily., Disp: , Rfl:     HYDROcodone-acetaminophen (NORCO)  MG per tablet, Take 1 tablet by mouth Every 6 (Six) Hours As Needed for Moderate Pain., Disp: , Rfl:     irbesartan (AVAPRO) 300 MG tablet, Take 1 tablet by mouth Every Night., Disp: , Rfl:     montelukast (SINGULAIR) 10 MG tablet, Take 1 tablet by mouth Every Night., Disp: , Rfl:            Family History   Problem Relation Age of Onset    Colon polyps Father           > 60 years old    Brain cancer Brother      Colon cancer Neg Hx      Esophageal cancer Neg Hx      Liver cancer Neg Hx      Liver disease Neg Hx      Rectal cancer Neg Hx      Stomach cancer Neg Hx        Social History   Social History            Socioeconomic History    Marital status:    Tobacco Use    Smoking status: Never    Smokeless tobacco: Never   Substance and Sexual Activity    Alcohol use: Yes       Comment: Occasionally     Drug use: Never    Sexual activity:  "Not Currently       Partners: Male       Birth control/protection: Post-menopausal, Hysterectomy         Allergies         Allergies   Allergen Reactions    Diovan [Valsartan] Other (See Comments)       \"Kidney failure\"    Meloxicam Other (See Comments)       \"Kidney failure\"    Atorvastatin Other (See Comments)       Body hurts when taking    Declomycin [Demeclocycline] Rash                  Family History   Problem Relation Age of Onset    Colon polyps Father           > 60 years old    Brain cancer Brother      Colon cancer Neg Hx      Esophageal cancer Neg Hx      Liver cancer Neg Hx      Liver disease Neg Hx      Rectal cancer Neg Hx      Stomach cancer Neg Hx        Review of Systems   Constitutional:  Negative for activity change and unexpected weight change.   HENT:          Significant dry mouth and dry eyes with oxybutynin   Genitourinary:  Positive for enuresis, frequency and urgency.        OAB symptoms improved with oxybutynin                  Objective  /78   Ht 152.4 cm (60\")   Wt 69.4 kg (153 lb)   BMI 29.88 kg/m²      Physical Exam  Vitals and nursing note reviewed.   Constitutional:       General: She is not in acute distress.     Appearance: Normal appearance. She is well-developed and normal weight.   HENT:      Head: Normocephalic and atraumatic.   Neck:      Thyroid: No thyromegaly.   Pulmonary:      Effort: Pulmonary effort is normal.   Genitourinary:     Comments: No significant prolapse on exam  Musculoskeletal:         General: Normal range of motion.      Cervical back: Normal range of motion.   Skin:     General: Skin is warm and dry.   Neurological:      Mental Status: She is alert and oriented to person, place, and time.   Psychiatric:         Mood and Affect: Mood normal.         Behavior: Behavior normal.         Thought Content: Thought content normal.         Judgment: Judgment normal.            Imaging   No data reviewed           Assessment & Plan     Diagnoses and all " orders for this visit:     1. OAB (overactive bladder) (Primary): Patient with OAB that has responded positively to anticholinergic medication, but she says dry mouth and dry eyes are intolerable.  She presented to discuss other treatment options.  Patient has seen video brochure for Interstim and we have discussed the in-office trial version of the device.  The patient likes the idea of a treatment modality that will not have side effects and will return on Feb 28th since that is the next day we are scheduled to place temporary devices.  All of her questions have been answered.     Subsequent to the above office visit, the patient had a temporary PNE trial in the office.  She wore the temporary device for 5 days and reported significant improvement in her symptoms.  The patient was very excited to be having a permanent implant and is scheduled today for a stage I/stage II procedure.     Shivani Dominguez MD  6/24/2024  11:48 CDT

## 2024-06-24 NOTE — OP NOTE
BH Roslyn Corbin  : 1950  MRN: [id number]  [facility]: [id number]  Date: 2024    Operative Note    INTERSTIM STAGES 1 AND 2 LEAD AND GENERATOR PLACEMENT      Pre-op Diagnosis:  OAB (overactive bladder) [N32.81]   Post-op Diagnosis:  Post-Op Diagnosis Codes:     * OAB (overactive bladder) [N32.81]   Procedure: Procedure(s):  INTERSTIM STAGES 1 AND 2 LEAD AND GENERATOR PLACEMENT   Surgeon: Surgeon(s):  Shivani Dominguez MD       Anesthesia: General   Estimated Blood Loss: 10  mls   IVF: 600  mls       ABx: 2 grams ancef   Specimens:  none   Findings: Excellent toe flexion and maribeth.  S3 placement was confirmed with fluoroscopy     Complications: none   Description of Procedure:       The patient was taken into the operating room where general anesthesia was administered on the stretcher.  After intubation, the patient was rolled into a prone position with jelly rolls to protect her chest and breasts, a roll blanket under her hips, and 2 pillows under her feet.  An InterStim lead wire was then placed in the S3 foramen on the patient's right side.  A small stab wound was made to allow advancement of the introducer in the skin at that site.  The patient had good toe flexion with stimulation at each of the 4 leads.  The lead was positioned using live fluoroscopy to make sure that the depth and curve of the lead was optimal.  A scalpel was then used to make a 3 cm incision in the patient's upper left buttock; a pocket was created in the subcutaneous fat layer using blunt dissection.  The lead wire was then tunneled from the puncture site to the battery pocket using the provided tunneling device.  The lead wire was then inserted into the InterStim battery and tightened with the supplied screw .  The power generator was inserted into a dissolvable antibiotic pouch before tucking it into the pocket and the patient's fat layer.  The device was programmed by the [employer] rep who was in the room.  The  C-arm was used to make one final image to document correct placement.  The subcutaneous tissue at the patient's battery pocket was reapproximated using 3-0 Vicryl.  Skin was closed in subcuticular fashion using 4-0 Monocryl.  One interrupted suture using 4-0 Monocryl was placed at the stab wound with the lead wire had initially been inserted.  Steri strips were placed over both incisions.  The patient tolerated the procedure well.  Sponge lap and needle counts were correct ×2.        Shivani Dominguez MD   6/24/2024  12:53 CDT

## 2024-06-24 NOTE — ANESTHESIA POSTPROCEDURE EVALUATION
Patient: Eliza Corbin    Procedure Summary       Date: 06/24/24 Room / Location:  [facility] OR 03 / [facility] OR    Anesthesia Start: 1200 Anesthesia Stop: 1257    Procedure: INTERSTIM STAGES 1 AND 2 LEAD AND GENERATOR PLACEMENT Diagnosis:       OAB (overactive bladder)      (OAB (overactive bladder) [N32.81])    Surgeons: Shivani Dominguez MD Provider: Jaime Lemus CRNA    Anesthesia Type: general ASA Status: 3            Anesthesia Type: general    Vitals  Vitals Value Taken Time   /67 06/24/24 1337   Temp 97.6 °F (36.4 °C) 06/24/24 1330   Pulse 75 06/24/24 1343   Resp 14 06/24/24 1330   SpO2 95 % 06/24/24 1343   Vitals shown include unfiled device data.        Post Anesthesia Care and Evaluation    Patient location during evaluation: PACU  Patient participation: complete - patient participated  Level of consciousness: awake and alert  Pain management: adequate    Airway patency: patent  Anesthetic complications: No anesthetic complications    Cardiovascular status: acceptable  Respiratory status: acceptable  Hydration status: acceptable    Comments: Blood pressure 143/79, pulse 73, temperature 97.6 °F (36.4 °C), temperature source Temporal, resp. rate 16, SpO2 92%, not currently breastfeeding.    Pt discharged from PACU based on meredith score >8

## 2024-06-24 NOTE — ANESTHESIA PREPROCEDURE EVALUATION
Anesthesia Evaluation     Patient summary reviewed and Nursing notes reviewed   history of anesthetic complications:  PONV  NPO Solid Status: > 8 hours  NPO Liquid Status: > 6 hours           Airway   Mallampati: II  TM distance: >3 FB  Neck ROM: limited  Dental - normal exam     Pulmonary    Cardiovascular     Patient on routine beta blocker    (+) hypertension well controlled 2 medications or greater, CAD, hyperlipidemia      Neuro/Psych  (+) CVA (after sinus sgx)  GI/Hepatic/Renal/Endo    (+) hiatal hernia, GERD    Musculoskeletal     (+) back pain  Abdominal    Substance History      OB/GYN negative ob/gyn ROS         Other                      Anesthesia Plan    ASA 3     general     intravenous induction     Anesthetic plan, risks, benefits, and alternatives have been provided, discussed and informed consent has been obtained with: patient.

## 2024-06-24 NOTE — ANESTHESIA PROCEDURE NOTES
Airway  Urgency: elective    Date/Time: 6/24/2024 12:04 PM  Airway not difficult    General Information and Staff    Patient location during procedure: OR  CRNA/CAA: Jaime Lemus CRNA    Indications and Patient Condition  Indications for airway management: airway protection    Preoxygenated: yes  Mask difficulty assessment: 1 - vent by mask    Final Airway Details  Final airway type: endotracheal airway      Successful airway: ETT  Cuffed: yes   Successful intubation technique: direct laryngoscopy  Facilitating devices/methods: intubating stylet  Endotracheal tube insertion site: oral  Blade: Maza  Blade size: 2  ETT size (mm): 7.0  Cormack-Lehane Classification: grade I - full view of glottis  Placement verified by: capnometry   Cuff volume (mL): 6  Measured from: lips  ETT/EBT  to lips (cm): 21  Number of attempts at approach: 1  Assessment: lips, teeth, and gum same as pre-op and atraumatic intubation

## 2024-06-28 ENCOUNTER — TELEPHONE (OUTPATIENT)
Dept: OBSTETRICS AND GYNECOLOGY | Age: 74
End: 2024-06-28
Payer: MEDICARE

## 2024-06-28 NOTE — TELEPHONE ENCOUNTER
Pt had Interstim placement on 06/24/24. Pt calling today to ask if it is ok for her to shower and also if she needs to continue the Oxybutynin she has been taking previously. Next OV 07/09/24. Please advise

## 2024-06-28 NOTE — TELEPHONE ENCOUNTER
Pt called back and I advised her that she may shower however, not soak in a tub. Pt advised that she would need to stop taking the Oxybutynin. Pt voiced understanding

## 2024-07-09 ENCOUNTER — OFFICE VISIT (OUTPATIENT)
Dept: OBSTETRICS AND GYNECOLOGY | Age: 74
End: 2024-07-09
Payer: MEDICARE

## 2024-07-09 VITALS
BODY MASS INDEX: 28.7 KG/M2 | HEIGHT: 61 IN | DIASTOLIC BLOOD PRESSURE: 84 MMHG | SYSTOLIC BLOOD PRESSURE: 164 MMHG | WEIGHT: 152 LBS

## 2024-07-09 DIAGNOSIS — Z09 S/P GYNECOLOGICAL SURGERY, FOLLOW-UP EXAM: Primary | ICD-10-CM

## 2024-07-09 PROCEDURE — 99024 POSTOP FOLLOW-UP VISIT: CPT | Performed by: OBSTETRICS & GYNECOLOGY

## 2024-07-09 RX ORDER — FUROSEMIDE 20 MG/1
TABLET ORAL
COMMUNITY

## 2024-07-09 RX ORDER — AMOXICILLIN 875 MG/1
TABLET, COATED ORAL
COMMUNITY

## 2024-07-09 NOTE — PROGRESS NOTES
"Subjective   Chief Complaint   Patient presents with    Post-op     Pt here today for 2 week post op interstim stages 1 and 2 lead and gen placement. Pt voices having urinary leakage and frequency. Will be reaching out to rep today. Pt did UA through PCP and is currently on amoxicillin. Pt voices no other concerns.      Eliza Corbin is a 74 y.o. year old  presenting to be seen for her post-operative visit.  She had a Interstim stage I and II, 2 weeks ago.  Currently she reports pain for only a few days after surgery, but is not having any pain at this time.  She reports bladder symptoms were significantly improved immediately following surgery, but then she started having symptoms of a UTI last Monday.  Eliza describes urgency, frequency, fever; she was seen by PCP and is currently on antibiotics.  She is supposed to be on her amoxicillin for three more days.  Patient feels like symptoms are getting better.    No Additional Complaints Reported    The following portions of the patient's history were reviewed and updated as appropriate:current medications and allergies    Review of Systems      Objective   /84   Ht 153.7 cm (60.5\")   Wt 68.9 kg (152 lb)   BMI 29.20 kg/m²     General:  well developed; well nourished  no acute distress   Abdomen: Not performed.   Pelvis: Not performed.  Incision healing well, covered with steri strips.  No redness or induration.          Assessment   Pt is 2 weeks s/p Interstim stage I and II  Currently being treated for UTI, but feels like symptoms are improving     Plan   RTO in 3 months    No orders of the defined types were placed in this encounter.         This note was electronically signed.    Shviani Dominguez MD  2024  Answers submitted by the patient for this visit:  Other (Submitted on 2024)  Please describe your symptoms.: Postop visit  Have you had these symptoms before?: No  How long have you been having these symptoms?: Greater than 2 " weeks  Please describe any probable cause for these symptoms. : Post op  Primary Reason for Visit (Submitted on 7/2/2024)  What is the primary reason for your visit?: Other

## 2024-07-12 NOTE — TELEPHONE ENCOUNTER
Requested Prescriptions     Pending Prescriptions Disp Refills    tiZANidine (ZANAFLEX) 4 MG tablet [Pharmacy Med Name: TIZANIDINE HCL 4 MG TABLET] 270 tablet 1     Sig: TAKE 1 TABLET BY MOUTH 3 TIMES DAILY AS NEEDED (SPASMS)       Last Office Visit: 6/19/2024  Next Office Visit: 8/7/2024  Last Medication Refill: 6/19/24 w 0 rf

## 2024-07-15 RX ORDER — TIZANIDINE 4 MG/1
4 TABLET ORAL 3 TIMES DAILY PRN
Qty: 90 TABLET | Refills: 0 | Status: SHIPPED | OUTPATIENT
Start: 2024-07-15 | End: 2024-08-14

## 2024-08-06 ENCOUNTER — TELEPHONE (OUTPATIENT)
Dept: CARDIOLOGY CLINIC | Age: 74
End: 2024-08-06

## 2024-08-07 ENCOUNTER — OFFICE VISIT (OUTPATIENT)
Dept: CARDIOLOGY CLINIC | Age: 74
End: 2024-08-07

## 2024-08-07 ENCOUNTER — OFFICE VISIT (OUTPATIENT)
Dept: NEUROSURGERY | Age: 74
End: 2024-08-07
Payer: MEDICARE

## 2024-08-07 VITALS
HEIGHT: 60 IN | HEART RATE: 70 BPM | SYSTOLIC BLOOD PRESSURE: 136 MMHG | WEIGHT: 154 LBS | RESPIRATION RATE: 18 BRPM | OXYGEN SATURATION: 92 % | BODY MASS INDEX: 30.23 KG/M2 | DIASTOLIC BLOOD PRESSURE: 80 MMHG

## 2024-08-07 VITALS
HEIGHT: 60 IN | BODY MASS INDEX: 30.23 KG/M2 | DIASTOLIC BLOOD PRESSURE: 78 MMHG | HEART RATE: 66 BPM | WEIGHT: 154 LBS | SYSTOLIC BLOOD PRESSURE: 120 MMHG

## 2024-08-07 DIAGNOSIS — G89.29 CHRONIC MIDLINE LOW BACK PAIN WITH LEFT-SIDED SCIATICA: ICD-10-CM

## 2024-08-07 DIAGNOSIS — M79.10 MYALGIA DUE TO STATIN: ICD-10-CM

## 2024-08-07 DIAGNOSIS — M51.36 DDD (DEGENERATIVE DISC DISEASE), LUMBAR: ICD-10-CM

## 2024-08-07 DIAGNOSIS — M54.42 CHRONIC MIDLINE LOW BACK PAIN WITH LEFT-SIDED SCIATICA: ICD-10-CM

## 2024-08-07 DIAGNOSIS — M25.552 LEFT HIP PAIN: ICD-10-CM

## 2024-08-07 DIAGNOSIS — M43.16 SPONDYLOLISTHESIS AT L4-L5 LEVEL: ICD-10-CM

## 2024-08-07 DIAGNOSIS — M41.50 DEGENERATIVE SCOLIOSIS: Primary | ICD-10-CM

## 2024-08-07 DIAGNOSIS — I10 ESSENTIAL HYPERTENSION: ICD-10-CM

## 2024-08-07 DIAGNOSIS — T46.6X5A MYALGIA DUE TO STATIN: ICD-10-CM

## 2024-08-07 DIAGNOSIS — I25.10 CORONARY ARTERY DISEASE INVOLVING NATIVE CORONARY ARTERY OF NATIVE HEART WITHOUT ANGINA PECTORIS: Primary | ICD-10-CM

## 2024-08-07 DIAGNOSIS — E78.2 MIXED HYPERLIPIDEMIA: ICD-10-CM

## 2024-08-07 DIAGNOSIS — R00.1 BRADYCARDIA: ICD-10-CM

## 2024-08-07 PROCEDURE — 99214 OFFICE O/P EST MOD 30 MIN: CPT | Performed by: NURSE PRACTITIONER

## 2024-08-07 PROCEDURE — 1123F ACP DISCUSS/DSCN MKR DOCD: CPT | Performed by: NURSE PRACTITIONER

## 2024-08-07 PROCEDURE — 3075F SYST BP GE 130 - 139MM HG: CPT | Performed by: NURSE PRACTITIONER

## 2024-08-07 PROCEDURE — 3079F DIAST BP 80-89 MM HG: CPT | Performed by: NURSE PRACTITIONER

## 2024-08-07 ASSESSMENT — ENCOUNTER SYMPTOMS
GASTROINTESTINAL NEGATIVE: 1
EYE REDNESS: 0
BACK PAIN: 1
WHEEZING: 0
COUGH: 0
RESPIRATORY NEGATIVE: 1
CHEST TIGHTNESS: 0
VOMITING: 0
FACIAL SWELLING: 0
SHORTNESS OF BREATH: 1
EYES NEGATIVE: 1
ABDOMINAL PAIN: 0
NAUSEA: 0

## 2024-08-07 NOTE — PROGRESS NOTES
Review of Systems   Constitutional: Negative.    HENT: Negative.     Eyes: Negative.    Respiratory: Negative.     Cardiovascular: Negative.    Gastrointestinal: Negative.    Genitourinary: Negative.    Musculoskeletal:  Positive for back pain, joint pain and myalgias.   Skin: Negative.    Neurological:  Positive for tingling.   Endo/Heme/Allergies: Negative.    Psychiatric/Behavioral: Negative.        
SPINE WO CONTRAST            PLAN:  -Obtain MRI lumbar spine (has a bladder stimulator that is MRI compatible) she was told that she just has to turn it off during the scan.    -Follow up after films       This dictation was generated by voice recognition computer software.  Although all attempts are made to edit the dictation for accuracy, there may be errors in the transcription that are not intended.      Jayde Ureña, APRN

## 2024-08-07 NOTE — PATIENT INSTRUCTIONS
Return in about 1 year (around 8/7/2025) for APRN.   Continue healthy heart diet and regular exercise  Call for chest pain or unusual shortness of breath

## 2024-08-07 NOTE — PROGRESS NOTES
[facility]  [address] [facility], [facility]  [address]  Phone: [phone]  Fax: [phone]    OFFICE VISIT:  2024    Alisson Valderrama - : 1950    Reason For Visit:  Alisson is a 74 y.o. female who is here for Follow-up and Coronary artery disease involving native coronary artery of      HPI  Patient is here for follow-up for  Mild to moderate, nonocclusive CAD  with the  following cardiac history:  2014  lexiscan Positive for inferior apical myocardial ischemia, EF 62%, 2% ischemic myocardium on stress, low risk findings, AUC indication 15, AUC score 4  2014  Cath  Mild to moderate distal disease, normal LVFX  2016  lexiscan  Positive for inferor lateral MI + myocardial ischemia, EF 69%, 12/11% ischemic myocardium on stress, low risk findings, AUC indication 15, AUC score 4  16  Cath  Mild to moderate distal disease, normal LVFX  21- DSE negative for ischemia    Patient follows with our office for history of mild to moderate nonobstructive coronary artery disease, hypertension, hyperlipidemia with statin intolerance.  Other history includes asthma.    She denies any cardiac symptoms such as chest pain, unusual dyspnea, orthopnea, PND, unusual edema, palpitations.      She is trying to increase her activity level and is going to the gym and riding a stationary bike couple of times per week and tolerating well.      Jia Pratt DO is PCP.  Alisson Valderrama has the following history as recorded in [facility]:    Patient Active Problem List    Diagnosis Date Noted    CPAP (continuous positive airway pressure) dependence 2022    Periprosthetic fracture around internal prosthetic left hip joint, initial encounter ([facility]) 2023    Hip dislocation, left ([facility]) 2023    Leukocytosis 2023    Myalgia due to statin 2022    Obstructive sleep apnea 10/01/2021    S/P FESS (functional endoscopic sinus surgery) 2020

## 2024-08-08 RX ORDER — TIZANIDINE 4 MG/1
4 TABLET ORAL 3 TIMES DAILY PRN
Qty: 270 TABLET | Refills: 1 | Status: SHIPPED | OUTPATIENT
Start: 2024-08-08 | End: 2025-02-04

## 2024-08-08 NOTE — TELEPHONE ENCOUNTER
Requested Prescriptions     Pending Prescriptions Disp Refills    tiZANidine (ZANAFLEX) 4 MG tablet [Pharmacy Med Name: TIZANIDINE HCL 4 MG TABLET] 270 tablet 1     Sig: TAKE 1 TABLET BY MOUTH 3 TIMES DAILY AS NEEDED (SPASMS)       Last Office Visit: 8/7/2024  Next Office Visit: 9/10/2024  Last Medication Refill:  7/15/2024 with 0 RF

## 2024-08-21 ENCOUNTER — TELEPHONE (OUTPATIENT)
Dept: CARDIOLOGY CLINIC | Age: 74
End: 2024-08-21

## 2024-08-21 NOTE — TELEPHONE ENCOUNTER
Date: 8/30/24     Cardiologist: only NP since JDT     Procedure: Laparoscopic Paraesophageal Hernia Repair     Surgeon: Dinh     Last Office Visit: 8/7/24  Reason for office visit and medical concerns addressed at this office visit: cad, htn, hyperlipidemia, ckd,      Testing Performed and Date of Service:  6/21/24 SE   Overall, due to patient's Duke treadmill score of 1.5, this is an   intermediate risk test for ischemia.     Taylor treadmill score of 1.5     No ischemic changes on EKG during stress     No clinical symptoms of ischemia during stress     Hypertensive blood pressure response to stress, normal heart rate   response to stress     Adequate functional capacity at 5 metabolic equivalents      RCRI = 0. 9  METs 4     Current Medications: amlodipine, cymbalta, abilify, norco, clonidine, irbesartan, singulair, xanax, tizanidine,      Is the patient currently taking an anticoagulant? If so, what is the diagnosis the patient has been given to warrant the need for the anticoagulant? none     Additional Notes: requesting cardiac clearance

## 2024-08-27 RX ORDER — AMLODIPINE BESYLATE 5 MG/1
5 TABLET ORAL DAILY
Qty: 90 TABLET | Refills: 3 | Status: SHIPPED | OUTPATIENT
Start: 2024-08-27

## 2024-08-30 PROCEDURE — 88307 TISSUE EXAM BY PATHOLOGIST: CPT | Performed by: SURGERY

## 2024-09-02 ENCOUNTER — APPOINTMENT (OUTPATIENT)
Dept: CT IMAGING | Age: 74
End: 2024-09-02
Payer: MEDICARE

## 2024-09-02 ENCOUNTER — APPOINTMENT (OUTPATIENT)
Dept: GENERAL RADIOLOGY | Age: 74
End: 2024-09-02
Payer: MEDICARE

## 2024-09-02 ENCOUNTER — HOSPITAL ENCOUNTER (EMERGENCY)
Age: 74
Discharge: ANOTHER HOSPITAL | End: 2024-09-03
Attending: STUDENT IN AN ORGANIZED HEALTH CARE EDUCATION/TRAINING PROGRAM
Payer: MEDICARE

## 2024-09-02 DIAGNOSIS — K91.89 POSTOPERATIVE SURGICAL COMPLICATION INVOLVING DIGESTIVE SYSTEM ASSOCIATED WITH DIGESTIVE SYSTEM PROCEDURE, UNSPECIFIED COMPLICATION: ICD-10-CM

## 2024-09-02 DIAGNOSIS — A41.9 SEPTICEMIA (HCC): Primary | ICD-10-CM

## 2024-09-02 DIAGNOSIS — J18.9 PNEUMONIA OF LEFT LOWER LOBE DUE TO INFECTIOUS ORGANISM: ICD-10-CM

## 2024-09-02 LAB
ALBUMIN SERPL-MCNC: 3.4 G/DL (ref 3.5–5.2)
ALP SERPL-CCNC: 120 U/L (ref 35–104)
ALT SERPL-CCNC: 42 U/L (ref 5–33)
ANION GAP SERPL CALCULATED.3IONS-SCNC: 14 MMOL/L (ref 7–19)
AST SERPL-CCNC: 38 U/L (ref 5–32)
B PARAP IS1001 DNA NPH QL NAA+NON-PROBE: NOT DETECTED
B PERT.PT PRMT NPH QL NAA+NON-PROBE: NOT DETECTED
BACTERIA #/AREA URNS HPF: NORMAL /HPF
BASOPHILS # BLD: 0 K/UL (ref 0–0.2)
BASOPHILS NFR BLD: 0 % (ref 0–1)
BILIRUB SERPL-MCNC: 1.9 MG/DL (ref 0.2–1.2)
BILIRUB UR QL STRIP: NEGATIVE
BUN SERPL-MCNC: 31 MG/DL (ref 8–23)
C PNEUM DNA NPH QL NAA+NON-PROBE: NOT DETECTED
CALCIUM SERPL-MCNC: 9.4 MG/DL (ref 8.8–10.2)
CHLORIDE SERPL-SCNC: 92 MMOL/L (ref 98–111)
CLARITY UR: ABNORMAL
CO2 SERPL-SCNC: 24 MMOL/L (ref 22–29)
COARSE GRAN CASTS #/AREA URNS LPF: NORMAL /LPF (ref 0–5)
COLOR UR: ABNORMAL
CREAT SERPL-MCNC: 0.8 MG/DL (ref 0.5–0.9)
CRYSTALS URNS MICRO: NORMAL /HPF
D DIMER PPP FEU-MCNC: 6.3 UG/ML FEU (ref 0–0.48)
EOSINOPHIL # BLD: 0 K/UL (ref 0–0.6)
EOSINOPHIL NFR BLD: 0 % (ref 0–5)
ERYTHROCYTE [DISTWIDTH] IN BLOOD BY AUTOMATED COUNT: 15.4 % (ref 11.5–14.5)
FLUAV RNA NPH QL NAA+NON-PROBE: NOT DETECTED
FLUBV RNA NPH QL NAA+NON-PROBE: NOT DETECTED
GLUCOSE SERPL-MCNC: 92 MG/DL (ref 70–99)
GLUCOSE UR STRIP.AUTO-MCNC: NEGATIVE MG/DL
HADV DNA NPH QL NAA+NON-PROBE: NOT DETECTED
HCOV 229E RNA NPH QL NAA+NON-PROBE: NOT DETECTED
HCOV HKU1 RNA NPH QL NAA+NON-PROBE: NOT DETECTED
HCOV NL63 RNA NPH QL NAA+NON-PROBE: NOT DETECTED
HCOV OC43 RNA NPH QL NAA+NON-PROBE: NOT DETECTED
HCT VFR BLD AUTO: 33.9 % (ref 37–47)
HGB BLD-MCNC: 11 G/DL (ref 12–16)
HGB UR STRIP.AUTO-MCNC: NEGATIVE MG/L
HMPV RNA NPH QL NAA+NON-PROBE: NOT DETECTED
HPIV1 RNA NPH QL NAA+NON-PROBE: NOT DETECTED
HPIV2 RNA NPH QL NAA+NON-PROBE: NOT DETECTED
HPIV3 RNA NPH QL NAA+NON-PROBE: NOT DETECTED
HPIV4 RNA NPH QL NAA+NON-PROBE: NOT DETECTED
IMM GRANULOCYTES # BLD: 0.1 K/UL
KETONES UR STRIP.AUTO-MCNC: NEGATIVE MG/DL
LACTATE BLDV-SCNC: 1.4 MMOL/L (ref 0.5–1.9)
LACTATE BLDV-SCNC: 2.1 MMOL/L (ref 0.5–1.9)
LEUKOCYTE ESTERASE UR QL STRIP.AUTO: ABNORMAL
LIPASE SERPL-CCNC: 8 U/L (ref 13–60)
LYMPHOCYTES # BLD: 2.7 K/UL (ref 1.1–4.5)
LYMPHOCYTES NFR BLD: 15 % (ref 20–40)
M PNEUMO DNA NPH QL NAA+NON-PROBE: NOT DETECTED
MCH RBC QN AUTO: 30.4 PG (ref 27–31)
MCHC RBC AUTO-ENTMCNC: 32.4 G/DL (ref 33–37)
MCV RBC AUTO: 93.6 FL (ref 81–99)
MONOCYTES # BLD: 0.9 K/UL (ref 0–0.9)
MONOCYTES NFR BLD: 5 % (ref 0–10)
NEUTROPHILS # BLD: 14.6 K/UL (ref 1.5–7.5)
NEUTS SEG NFR BLD: 80 % (ref 50–65)
NEUTS VAC BLD QL SMEAR: ABNORMAL
NITRITE UR QL STRIP.AUTO: NEGATIVE
PH UR STRIP.AUTO: 6 [PH] (ref 5–8)
PLATELET # BLD AUTO: 424 K/UL (ref 130–400)
PLATELET SLIDE REVIEW: ABNORMAL
PMV BLD AUTO: 9.5 FL (ref 9.4–12.3)
POTASSIUM SERPL-SCNC: 3.1 MMOL/L (ref 3.5–5)
PROT SERPL-MCNC: 6.6 G/DL (ref 6.4–8.3)
PROT UR STRIP.AUTO-MCNC: 100 MG/DL
RBC # BLD AUTO: 3.62 M/UL (ref 4.2–5.4)
RBC #/AREA URNS HPF: NORMAL /HPF (ref 0–2)
RSV RNA NPH QL NAA+NON-PROBE: NOT DETECTED
RV+EV RNA NPH QL NAA+NON-PROBE: NOT DETECTED
SARS-COV-2 RNA NPH QL NAA+NON-PROBE: NOT DETECTED
SODIUM SERPL-SCNC: 130 MMOL/L (ref 136–145)
SP GR UR STRIP.AUTO: 1.02 (ref 1–1.03)
SQUAMOUS #/AREA URNS HPF: NORMAL /HPF
UROBILINOGEN UR STRIP.AUTO-MCNC: 1 E.U./DL
WBC # BLD AUTO: 18.2 K/UL (ref 4.8–10.8)
WBC #/AREA URNS HPF: NORMAL /HPF (ref 0–5)

## 2024-09-02 PROCEDURE — 83605 ASSAY OF LACTIC ACID: CPT

## 2024-09-02 PROCEDURE — 99285 EMERGENCY DEPT VISIT HI MDM: CPT

## 2024-09-02 PROCEDURE — 70450 CT HEAD/BRAIN W/O DYE: CPT

## 2024-09-02 PROCEDURE — 6360000002 HC RX W HCPCS: Performed by: STUDENT IN AN ORGANIZED HEALTH CARE EDUCATION/TRAINING PROGRAM

## 2024-09-02 PROCEDURE — 81001 URINALYSIS AUTO W/SCOPE: CPT

## 2024-09-02 PROCEDURE — 83690 ASSAY OF LIPASE: CPT

## 2024-09-02 PROCEDURE — 85025 COMPLETE CBC W/AUTO DIFF WBC: CPT

## 2024-09-02 PROCEDURE — 2580000003 HC RX 258: Performed by: STUDENT IN AN ORGANIZED HEALTH CARE EDUCATION/TRAINING PROGRAM

## 2024-09-02 PROCEDURE — 96361 HYDRATE IV INFUSION ADD-ON: CPT

## 2024-09-02 PROCEDURE — 74177 CT ABD & PELVIS W/CONTRAST: CPT

## 2024-09-02 PROCEDURE — 0202U NFCT DS 22 TRGT SARS-COV-2: CPT

## 2024-09-02 PROCEDURE — 71045 X-RAY EXAM CHEST 1 VIEW: CPT

## 2024-09-02 PROCEDURE — 96365 THER/PROPH/DIAG IV INF INIT: CPT

## 2024-09-02 PROCEDURE — 85379 FIBRIN DEGRADATION QUANT: CPT

## 2024-09-02 PROCEDURE — 80053 COMPREHEN METABOLIC PANEL: CPT

## 2024-09-02 PROCEDURE — 36415 COLL VENOUS BLD VENIPUNCTURE: CPT

## 2024-09-02 PROCEDURE — 96375 TX/PRO/DX INJ NEW DRUG ADDON: CPT

## 2024-09-02 PROCEDURE — 6360000004 HC RX CONTRAST MEDICATION: Performed by: STUDENT IN AN ORGANIZED HEALTH CARE EDUCATION/TRAINING PROGRAM

## 2024-09-02 PROCEDURE — 71275 CT ANGIOGRAPHY CHEST: CPT

## 2024-09-02 PROCEDURE — 87040 BLOOD CULTURE FOR BACTERIA: CPT

## 2024-09-02 RX ORDER — 0.9 % SODIUM CHLORIDE 0.9 %
1000 INTRAVENOUS SOLUTION INTRAVENOUS ONCE
Status: COMPLETED | OUTPATIENT
Start: 2024-09-02 | End: 2024-09-02

## 2024-09-02 RX ORDER — MORPHINE SULFATE 4 MG/ML
4 INJECTION, SOLUTION INTRAMUSCULAR; INTRAVENOUS ONCE
Status: COMPLETED | OUTPATIENT
Start: 2024-09-02 | End: 2024-09-02

## 2024-09-02 RX ORDER — IOPAMIDOL 755 MG/ML
70 INJECTION, SOLUTION INTRAVASCULAR
Status: COMPLETED | OUTPATIENT
Start: 2024-09-02 | End: 2024-09-02

## 2024-09-02 RX ADMIN — IOPAMIDOL 70 ML: 755 INJECTION, SOLUTION INTRAVENOUS at 21:24

## 2024-09-02 RX ADMIN — PIPERACILLIN AND TAZOBACTAM 3375 MG: 3; .375 INJECTION, POWDER, LYOPHILIZED, FOR SOLUTION INTRAVENOUS at 21:30

## 2024-09-02 RX ADMIN — SODIUM CHLORIDE 1000 ML: 9 INJECTION, SOLUTION INTRAVENOUS at 21:43

## 2024-09-02 RX ADMIN — MORPHINE SULFATE 4 MG: 4 INJECTION, SOLUTION INTRAMUSCULAR; INTRAVENOUS at 23:32

## 2024-09-02 RX ADMIN — SODIUM CHLORIDE 1000 ML: 9 INJECTION, SOLUTION INTRAVENOUS at 20:39

## 2024-09-02 ASSESSMENT — ENCOUNTER SYMPTOMS
VOMITING: 0
EYE PAIN: 0
NAUSEA: 0
EYE REDNESS: 0
ABDOMINAL PAIN: 0
DIARRHEA: 0
SHORTNESS OF BREATH: 1
COUGH: 1
CHEST TIGHTNESS: 0
SORE THROAT: 0

## 2024-09-02 ASSESSMENT — PAIN - FUNCTIONAL ASSESSMENT: PAIN_FUNCTIONAL_ASSESSMENT: 0-10

## 2024-09-02 ASSESSMENT — PAIN SCALES - GENERAL
PAINLEVEL_OUTOF10: 7
PAINLEVEL_OUTOF10: 6

## 2024-09-03 VITALS
SYSTOLIC BLOOD PRESSURE: 105 MMHG | HEART RATE: 99 BPM | DIASTOLIC BLOOD PRESSURE: 57 MMHG | TEMPERATURE: 97.9 F | OXYGEN SATURATION: 92 % | BODY MASS INDEX: 26.33 KG/M2 | HEIGHT: 65 IN | RESPIRATION RATE: 25 BRPM | WEIGHT: 158 LBS

## 2024-09-03 NOTE — ED NOTES
Patient accepted at [facility] for ER to ER transfer. Dr. Bin Rutherford accepting.

## 2024-09-03 NOTE — ED NOTES
Called [facility] to speak to who is on call for Dr. Rutherford. He is on call for himself they gave me his number [phone]

## 2024-09-03 NOTE — ED PROVIDER NOTES
[facility] EMERGENCY DEPT  eMERGENCY dEPARTMENT eNCOUnter      Pt Name: Alisson Valderrama  MRN: [id number]  Birthdate 1950  Date of evaluation: 9/2/2024  Provider: Sera Mccartney MD    CHIEF COMPLAINT       Chief Complaint   Patient presents with    Post-op Problem    Hallucinations     S/P hiatla hernia surgery, esophageal surgery Friday son states \"seeing things that aren't there\"         HISTORY OF PRESENT ILLNESS   (Location/Symptom, Timing/Onset,Context/Setting, Quality, Duration, Modifying Factors, Severity)  Note limiting factors.     HPI    Alisson Valderrama is a 74 y.o. female with PMH of hypertension, hyperlipidemia, STEPHANIE, CAD, GERD, and recent hiatal hernia and esophageal surgery on 8/30/2024 (Dr. Rutherford, [facility]} who presents to the emergency department with CC of subjective fevers and chills, altered mental status, hallucinations, cough, shortness of breath, decreased appetite.  Patient had surgery on Friday.  New medications only included Percocet.  She normally takes Norco for chronic pain.  Her son brought her in today as she has been hallucinating, \"talking out of her head,\" and acting off for the last day or so.  She has had subjective fevers and chills but no measured fevers.  Her incisions are well-appearing.  She has not had any nausea, vomiting, diarrhea.  She complains of some cough and mild shortness of breath, is noted to be hypoxic to 87% on room air persistently with a good pleth requiring 2 L nasal cannula.  She is also noted to be tachycardic to 116.        NursingNotes were reviewed.    REVIEW OF SYSTEMS    (2-9 systems for level 4, 10 or more for level 5)     Review of Systems   Constitutional:  Positive for chills, fatigue and fever (subjective).   HENT:  Negative for congestion and sore throat.    Eyes:  Negative for pain and redness.   Respiratory:  Positive for cough and shortness of breath. Negative for chest tightness.    Cardiovascular:  Negative for chest pain and leg swelling.

## 2024-09-04 ENCOUNTER — LAB REQUISITION (OUTPATIENT)
Dept: LAB | Facility: HOSPITAL | Age: 74
End: 2024-09-04
Payer: MEDICARE

## 2024-09-04 DIAGNOSIS — K25.9 GASTRIC ULCER, UNSPECIFIED AS ACUTE OR CHRONIC, WITHOUT HEMORRHAGE OR PERFORATION: ICD-10-CM

## 2024-09-05 LAB
CYTO UR: NORMAL
LAB AP CASE REPORT: NORMAL
LAB AP CLINICAL INFORMATION: NORMAL
Lab: NORMAL
PATH REPORT.FINAL DX SPEC: NORMAL
PATH REPORT.GROSS SPEC: NORMAL

## 2024-09-07 LAB
BACTERIA BLD CULT ORG #2: NORMAL
BACTERIA BLD CULT: NORMAL

## 2024-09-26 ENCOUNTER — APPOINTMENT (OUTPATIENT)
Dept: GENERAL RADIOLOGY | Age: 74
End: 2024-09-26
Payer: MEDICARE

## 2024-09-26 ENCOUNTER — HOSPITAL ENCOUNTER (EMERGENCY)
Age: 74
Discharge: ANOTHER HOSPITAL | End: 2024-09-26
Payer: MEDICARE

## 2024-09-26 ENCOUNTER — TRANSCRIBE ORDERS (OUTPATIENT)
Dept: ADMINISTRATIVE | Age: 74
End: 2024-09-26

## 2024-09-26 ENCOUNTER — HOSPITAL ENCOUNTER (OUTPATIENT)
Dept: CT IMAGING | Age: 74
Discharge: HOME | End: 2024-09-26
Payer: MEDICARE

## 2024-09-26 VITALS
TEMPERATURE: 98.8 F | HEART RATE: 98 BPM | WEIGHT: 148.8 LBS | SYSTOLIC BLOOD PRESSURE: 138 MMHG | BODY MASS INDEX: 24.76 KG/M2 | RESPIRATION RATE: 20 BRPM | DIASTOLIC BLOOD PRESSURE: 70 MMHG | OXYGEN SATURATION: 98 %

## 2024-09-26 DIAGNOSIS — Z98.890 S/P NISSEN FUNDOPLICATION (WITHOUT GASTROSTOMY TUBE) PROCEDURE: ICD-10-CM

## 2024-09-26 DIAGNOSIS — D72.829 LEUKOCYTOSIS, UNSPECIFIED TYPE: Primary | ICD-10-CM

## 2024-09-26 DIAGNOSIS — T81.49XA SURGICAL SITE INFECTION: Primary | ICD-10-CM

## 2024-09-26 DIAGNOSIS — D72.829 LEUKOCYTOSIS, UNSPECIFIED TYPE: ICD-10-CM

## 2024-09-26 LAB
ALBUMIN SERPL-MCNC: 3.3 G/DL (ref 3.5–5.2)
ALP SERPL-CCNC: 135 U/L (ref 35–104)
ALT SERPL-CCNC: 16 U/L (ref 5–33)
ANION GAP SERPL CALCULATED.3IONS-SCNC: 18 MMOL/L (ref 7–19)
ANISOCYTOSIS BLD QL SMEAR: ABNORMAL
AST SERPL-CCNC: 36 U/L (ref 5–32)
BACTERIA #/AREA URNS HPF: ABNORMAL /HPF
BASO STIPL BLD QL SMEAR: ABNORMAL
BASOPHILS # BLD: 0 K/UL (ref 0–0.2)
BASOPHILS NFR BLD: 0 % (ref 0–1)
BILIRUB SERPL-MCNC: 1.2 MG/DL (ref 0.2–1.2)
BILIRUB UR QL STRIP: NEGATIVE
BNP BLD-MCNC: 2844 PG/ML (ref 0–124)
BUN SERPL-MCNC: 9 MG/DL (ref 8–23)
CALCIUM SERPL-MCNC: 9.2 MG/DL (ref 8.8–10.2)
CHLORIDE SERPL-SCNC: 92 MMOL/L (ref 98–111)
CLARITY UR: CLEAR
CO2 SERPL-SCNC: 18 MMOL/L (ref 22–29)
COLOR UR: YELLOW
CREAT SERPL-MCNC: 0.8 MG/DL (ref 0.5–0.9)
EOSINOPHIL # BLD: 0 K/UL (ref 0–0.6)
EOSINOPHIL NFR BLD: 0 % (ref 0–5)
ERYTHROCYTE [DISTWIDTH] IN BLOOD BY AUTOMATED COUNT: 15.5 % (ref 11.5–14.5)
GLUCOSE SERPL-MCNC: 125 MG/DL (ref 70–99)
GLUCOSE UR STRIP.AUTO-MCNC: NEGATIVE MG/DL
HCT VFR BLD AUTO: 37.3 % (ref 37–47)
HGB BLD-MCNC: 12.2 G/DL (ref 12–16)
HGB UR STRIP.AUTO-MCNC: NEGATIVE MG/L
IMM GRANULOCYTES # BLD: 0.5 K/UL
KETONES UR STRIP.AUTO-MCNC: 15 MG/DL
LACTATE BLDV-SCNC: 1.5 MMOL/L (ref 0.5–1.9)
LEUKOCYTE ESTERASE UR QL STRIP.AUTO: ABNORMAL
LIPASE SERPL-CCNC: 13 U/L (ref 13–60)
LYMPHOCYTES # BLD: 1.6 K/UL (ref 1.1–4.5)
LYMPHOCYTES NFR BLD: 6 % (ref 20–40)
MCH RBC QN AUTO: 28.8 PG (ref 27–31)
MCHC RBC AUTO-ENTMCNC: 32.7 G/DL (ref 33–37)
MCV RBC AUTO: 88.2 FL (ref 81–99)
MICROCYTES BLD QL SMEAR: ABNORMAL
MONOCYTES # BLD: 3 K/UL (ref 0–0.9)
MONOCYTES NFR BLD: 11 % (ref 0–10)
NEUTROPHILS # BLD: 22.3 K/UL (ref 1.5–7.5)
NEUTS BAND NFR BLD MANUAL: 9 % (ref 0–5)
NEUTS SEG NFR BLD: 74 % (ref 50–65)
NITRITE UR QL STRIP.AUTO: NEGATIVE
OVALOCYTES BLD QL SMEAR: ABNORMAL
PH UR STRIP.AUTO: 5.5 [PH] (ref 5–8)
PLATELET # BLD AUTO: 591 K/UL (ref 130–400)
PLATELET SLIDE REVIEW: ABNORMAL
PMV BLD AUTO: 9.3 FL (ref 9.4–12.3)
POLYCHROMASIA BLD QL SMEAR: ABNORMAL
POTASSIUM SERPL-SCNC: 4.5 MMOL/L (ref 3.5–5)
PROT SERPL-MCNC: 6.4 G/DL (ref 6.4–8.3)
PROT UR STRIP.AUTO-MCNC: 100 MG/DL
RBC # BLD AUTO: 4.23 M/UL (ref 4.2–5.4)
RBC #/AREA URNS HPF: ABNORMAL /HPF (ref 0–2)
SODIUM SERPL-SCNC: 128 MMOL/L (ref 136–145)
SP GR UR STRIP.AUTO: >=1.045 (ref 1–1.03)
SQUAMOUS #/AREA URNS HPF: ABNORMAL /HPF
STOMATOCYTES BLD QL SMEAR: ABNORMAL
UROBILINOGEN UR STRIP.AUTO-MCNC: 1 E.U./DL
WBC # BLD AUTO: 26.9 K/UL (ref 4.8–10.8)
WBC #/AREA URNS HPF: ABNORMAL /HPF (ref 0–5)
YEAST #/AREA URNS HPF: PRESENT /HPF

## 2024-09-26 PROCEDURE — 96375 TX/PRO/DX INJ NEW DRUG ADDON: CPT

## 2024-09-26 PROCEDURE — 81001 URINALYSIS AUTO W/SCOPE: CPT

## 2024-09-26 PROCEDURE — 85025 COMPLETE CBC W/AUTO DIFF WBC: CPT

## 2024-09-26 PROCEDURE — 6360000002 HC RX W HCPCS

## 2024-09-26 PROCEDURE — 74177 CT ABD & PELVIS W/CONTRAST: CPT

## 2024-09-26 PROCEDURE — 2580000003 HC RX 258: Performed by: NURSE PRACTITIONER

## 2024-09-26 PROCEDURE — 83605 ASSAY OF LACTIC ACID: CPT

## 2024-09-26 PROCEDURE — 93005 ELECTROCARDIOGRAM TRACING: CPT | Performed by: NURSE PRACTITIONER

## 2024-09-26 PROCEDURE — 6360000004 HC RX CONTRAST MEDICATION: Performed by: FAMILY MEDICINE

## 2024-09-26 PROCEDURE — 99285 EMERGENCY DEPT VISIT HI MDM: CPT

## 2024-09-26 PROCEDURE — 96365 THER/PROPH/DIAG IV INF INIT: CPT

## 2024-09-26 PROCEDURE — 6360000002 HC RX W HCPCS: Performed by: NURSE PRACTITIONER

## 2024-09-26 PROCEDURE — 83880 ASSAY OF NATRIURETIC PEPTIDE: CPT

## 2024-09-26 PROCEDURE — 83690 ASSAY OF LIPASE: CPT

## 2024-09-26 PROCEDURE — 80053 COMPREHEN METABOLIC PANEL: CPT

## 2024-09-26 PROCEDURE — 6370000000 HC RX 637 (ALT 250 FOR IP): Performed by: NURSE PRACTITIONER

## 2024-09-26 PROCEDURE — 87040 BLOOD CULTURE FOR BACTERIA: CPT

## 2024-09-26 PROCEDURE — 87086 URINE CULTURE/COLONY COUNT: CPT

## 2024-09-26 PROCEDURE — 71045 X-RAY EXAM CHEST 1 VIEW: CPT

## 2024-09-26 PROCEDURE — 36415 COLL VENOUS BLD VENIPUNCTURE: CPT

## 2024-09-26 RX ORDER — IOPAMIDOL 755 MG/ML
75 INJECTION, SOLUTION INTRAVASCULAR
Status: COMPLETED | OUTPATIENT
Start: 2024-09-26 | End: 2024-09-26

## 2024-09-26 RX ORDER — ONDANSETRON 2 MG/ML
INJECTION INTRAMUSCULAR; INTRAVENOUS
Status: COMPLETED
Start: 2024-09-26 | End: 2024-09-26

## 2024-09-26 RX ORDER — ONDANSETRON 2 MG/ML
4 INJECTION INTRAMUSCULAR; INTRAVENOUS ONCE
Status: COMPLETED | OUTPATIENT
Start: 2024-09-26 | End: 2024-09-26

## 2024-09-26 RX ORDER — 0.9 % SODIUM CHLORIDE 0.9 %
1000 INTRAVENOUS SOLUTION INTRAVENOUS ONCE
Status: COMPLETED | OUTPATIENT
Start: 2024-09-26 | End: 2024-09-26

## 2024-09-26 RX ORDER — FLUCONAZOLE 100 MG/1
150 TABLET ORAL ONCE
Status: COMPLETED | OUTPATIENT
Start: 2024-09-26 | End: 2024-09-26

## 2024-09-26 RX ADMIN — ONDANSETRON 4 MG: 2 INJECTION INTRAMUSCULAR; INTRAVENOUS at 19:44

## 2024-09-26 RX ADMIN — IOPAMIDOL 75 ML: 755 INJECTION, SOLUTION INTRAVENOUS at 12:48

## 2024-09-26 RX ADMIN — SODIUM CHLORIDE 1000 ML: 9 INJECTION, SOLUTION INTRAVENOUS at 17:27

## 2024-09-26 RX ADMIN — FLUCONAZOLE 150 MG: 100 TABLET ORAL at 19:38

## 2024-09-26 RX ADMIN — PIPERACILLIN AND TAZOBACTAM 3375 MG: 3; .375 INJECTION, POWDER, LYOPHILIZED, FOR SOLUTION INTRAVENOUS at 19:46

## 2024-09-26 ASSESSMENT — ENCOUNTER SYMPTOMS
VOMITING: 0
SHORTNESS OF BREATH: 0
DIARRHEA: 1
ABDOMINAL PAIN: 1

## 2024-09-27 LAB
BACTERIA BLD CULT ORG #2: NORMAL
BACTERIA BLD CULT: NORMAL

## 2024-09-28 LAB
BACTERIA UR CULT: NORMAL
EKG P AXIS: 6 DEGREES
EKG P-R INTERVAL: 136 MS
EKG Q-T INTERVAL: 324 MS
EKG QRS DURATION: 86 MS
EKG QTC CALCULATION (BAZETT): 390 MS
EKG T AXIS: 71 DEGREES

## 2024-09-28 PROCEDURE — 93010 ELECTROCARDIOGRAM REPORT: CPT | Performed by: INTERNAL MEDICINE

## 2024-10-01 LAB
BACTERIA BLD CULT ORG #2: NORMAL
BACTERIA BLD CULT: NORMAL

## 2024-10-24 ENCOUNTER — TELEPHONE (OUTPATIENT)
Dept: HEMATOLOGY | Age: 74
End: 2024-10-24

## 2024-10-24 NOTE — TELEPHONE ENCOUNTER
Called pt. to remind them of appointment on 10/28/2024 and had to leave a detailed voicemail with appointment date and time. Reminded patient to just come at appointment time, and to not come at the lab appointment time. Reminded patient that we will not check them in any more than 30 minutes before appointment time. We have now moved to the [facility] [place] that is located between our old office and the ER at the [facility]. Letting the Pt know that our front entrance faces the  [employer] fields, and also leaving our address.

## (undated) DEVICE — PACK ANT HIP CDS

## (undated) DEVICE — DEFOGGER!" ANTI FOG KIT: Brand: DEROYAL

## (undated) DEVICE — DRAPE,EENT,SPLIT,STERILE: Brand: MEDLINE

## (undated) DEVICE — CHLORAPREP 26ML ORANGE

## (undated) DEVICE — CODMAN® SURGICAL PATTIES 1/2" X 3" (1.27CM X 7.62CM): Brand: CODMAN®

## (undated) DEVICE — DEV INFL ALLIANCE2 SYS

## (undated) DEVICE — NEEDLE HYPO 27GA L1.25IN GRY POLYPR HUB S STL REG BVL STR

## (undated) DEVICE — SUTURE PERMA-HAND SZ 2-0 L30IN NONABSORBABLE BLK L26MM SH K833H

## (undated) DEVICE — SUTURE VCRL SZ 0 L27IN ABSRB UD L36MM CT-1 1/2 CIR J260H

## (undated) DEVICE — CVR UNIV C/ARM

## (undated) DEVICE — MASK,OXYGEN,MED CONC,ADLT,7' TUB, UC: Brand: PENDING

## (undated) DEVICE — Z INACTIVE NO SUPPLIER GELFOAM 12-7MM SPNG

## (undated) DEVICE — DEV NEUROSTIM EXT VERIFY SACRAL 353101

## (undated) DEVICE — DRESSING 440402 MEROCEL 10PK STD 8CM: Brand: MEROCEL

## (undated) DEVICE — SINU FOAM: Brand: SINU-FOAM

## (undated) DEVICE — GLOVE,SURG,SENSICARE,ALOE,LF,PF,6: Brand: MEDLINE

## (undated) DEVICE — SUTURE VCRL SZ 3-0 L27IN ABSRB UD L19MM PS-2 3/8 CIR PRIM J427H

## (undated) DEVICE — NEEDLE HYPO 18GA L1.5IN PNK POLYPR HUB S STL REG BVL STR

## (undated) DEVICE — ADHS SKIN PREMIERPRO EXOFIN TOPICAL HI/VISC .5ML

## (undated) DEVICE — NDL HYPO PRECISIONGLIDE REG 25G 1 1/2

## (undated) DEVICE — ANTIBACTERIAL UNDYED BRAIDED (POLYGLACTIN 910), SYNTHETIC ABSORBABLE SUTURE: Brand: COATED VICRYL

## (undated) DEVICE — INTENDED FOR TISSUE SEPARATION, AND OTHER PROCEDURES THAT REQUIRE A SHARP SURGICAL BLADE TO PUNCTURE OR CUT.: Brand: BARD-PARKER ® STAINLESS STEEL BLADES

## (undated) DEVICE — SENSR O2 OXIMAX FNGR A/ 18IN NONSTR

## (undated) DEVICE — ESOPHAGEAL BALLOON DILATATION CATHETER: Brand: CRE FIXED WIRE

## (undated) DEVICE — PAD MINOR UNIVERSAL: Brand: MEDLINE INDUSTRIES, INC.

## (undated) DEVICE — CONTAINER,SPECIMEN,OR STERILE,4OZ: Brand: MEDLINE

## (undated) DEVICE — SYSTEM SKIN CLSR 22CM DERMBND PRINEO

## (undated) DEVICE — RECIPROCATING BLADE DOUBLE SIDE (74.0 X 0.77MM)

## (undated) DEVICE — BIT DRL L100MM DIA2MM QUIK CPL W/O STP REUSE

## (undated) DEVICE — TUBE ET ID5.5MM ORAL NSL CUF INTMED MURPHY EYE HI LO SHILEY

## (undated) DEVICE — DRESSING FOAM ABSORBENT 8X4 IN BORDER SELF ADH MEPILEX

## (undated) DEVICE — CIRCUIT AD PLN SWVL WYE

## (undated) DEVICE — TUBE ET 6.5MM NSL ORAL BASIC CUF INTMED MURPHY EYE RADPQ

## (undated) DEVICE — MAJOR BSIN SETUP PK

## (undated) DEVICE — THE CHANNEL CLEANING BRUSH IS A NYLON FLEXI BRUSH ATTACHED TO A FLEXIBLE PLASTIC SHEATH DESIGNED TO SAFELY REMOVE DEBRIS FROM FLEXIBLE ENDOSCOPES.

## (undated) DEVICE — SUTURE PERMAHAND SZ 0 L30IN NONABSORBABLE BLK SILK BRAID A306H

## (undated) DEVICE — GLOVE SURG SZ 75 L12IN FNGR THK94MIL TRNSLUC YEL LTX

## (undated) DEVICE — STRIP CLS WND CURAD MEDI/STRIP HYPOALLERG 0.25X4IN PK/10

## (undated) DEVICE — DRAPE,TOWEL,LARGE,INVISISHIELD: Brand: MEDLINE

## (undated) DEVICE — BAND BAG 36" X 36": Brand: TIDI

## (undated) DEVICE — VAGINAL PREP TRAY: Brand: MEDLINE INDUSTRIES, INC.

## (undated) DEVICE — YANKAUER,BULB TIP WITH VENT: Brand: ARGYLE

## (undated) DEVICE — BIT DRL L145MM DIA3.2MM QUIK CPL W/O STP REUSE

## (undated) DEVICE — DUAL CUT SAGITTAL BLADE

## (undated) DEVICE — FRCP BX RADJAW4 NDL 2.8 240 STD OG

## (undated) DEVICE — BLADE SAW W12.5XL70MM THK1MM RECIP DBL SIDE OFFSET

## (undated) DEVICE — SUTURE VCRL SZ 2-0 L36IN ABSRB UD L36MM CT-1 1/2 CIR J945H

## (undated) DEVICE — STERILE (14X122CM) TELESCOPICALLY-FOLDED COVER: Brand: CIV-CLEAR™ TRANSDUCER COVER

## (undated) DEVICE — CONMED SCOPE SAVER BITE BLOCK, 20X27 MM: Brand: SCOPE SAVER

## (undated) DEVICE — SOLUTION IRRIG 3000ML 0.9% SOD CHL USP UROMATIC PLAS CONT

## (undated) DEVICE — DRP C/ARMOR

## (undated) DEVICE — CUFF,BP,DISP,1 TUBE,ADULT,HP: Brand: MEDLINE

## (undated) DEVICE — TBG SMPL FLTR LINE NASL 02/C02 A/ BX/100

## (undated) DEVICE — MASK ANES AD M SZ 5 PREM TAIL VLV INFL PRT UNSCENTED HK RNG

## (undated) DEVICE — HDRST INTUB GENTLETOUCH SLOT 7IN RT

## (undated) DEVICE — GLOVE SURG SZ 85 L12IN FNGR THK79MIL GRN LTX FREE

## (undated) DEVICE — LIGHT SUCT UNTETHERED SCINTILLANT

## (undated) DEVICE — DRESSING FOAM SELF ADH 20X10 CM ABSORBENT MEPILEX BORDER

## (undated) DEVICE — SUTURE CHROMIC GUT SZ 4-0 L27IN ABSRB BRN L17MM RB-1 1/2 U203H

## (undated) DEVICE — BLADE LARYNSCP HNDL MAC 3 DISP CURAVIEW LED

## (undated) DEVICE — SUT SILK 0 SUTUPAK TIES 24IN SA76G

## (undated) DEVICE — KT PT/PROG SMRT INTERSTIM/X NEUROSTM W/COMMUNIC

## (undated) DEVICE — TUBING, SUCTION, 1/4" X 12', STRAIGHT: Brand: MEDLINE

## (undated) DEVICE — SCREW BNE L42MM DIA4.5MM PROX CORT TIB S STL ST LOK FULL
Type: IMPLANTABLE DEVICE | Status: NON-FUNCTIONAL
Removed: 2023-04-25

## (undated) DEVICE — BLADE LARYNSCP SZ 3 TI DISP SPECTRM DVM

## (undated) DEVICE — Device: Brand: DEFENDO AIR/WATER/SUCTION AND BIOPSY VALVE

## (undated) DEVICE — GLOVE SURG SZ 85 CRM LTX FREE POLYISOPRENE POLYMER BEAD ANTI

## (undated) DEVICE — SYRINGE 20ML LL S/C 50